# Patient Record
Sex: FEMALE | Race: WHITE | NOT HISPANIC OR LATINO | Employment: OTHER | URBAN - METROPOLITAN AREA
[De-identification: names, ages, dates, MRNs, and addresses within clinical notes are randomized per-mention and may not be internally consistent; named-entity substitution may affect disease eponyms.]

---

## 2017-01-16 ENCOUNTER — TELEPHONE (OUTPATIENT)
Dept: FAMILY MEDICINE | Facility: CLINIC | Age: 67
End: 2017-01-16

## 2017-01-16 NOTE — TELEPHONE ENCOUNTER
Pt was in the hospital and they sent her home with oxygen and she no longer uses    Pt has not used the oxygen it for months     Account   Fax number [phone]    Letter written and faxed    An Escamilla RN, BSN   [place], [facility]

## 2017-01-16 NOTE — Clinical Note
[facility]  [address] [place] [id number]                                                                                                       [phone]    January 16, 2017    Carmen Perez Birth date [birth year]    Account number:     [id number] [facility] [address]  [facility] [address]      To Whom it May Concern:    Please discontinue the above patient's home oxygen's use and collect oxygen currently at her home. Please contact me with questions or concerns.       Sincerely,    Keerthi Danielson PA-C

## 2017-03-23 DIAGNOSIS — I50.20 SYSTOLIC CONGESTIVE HEART FAILURE, UNSPECIFIED CONGESTIVE HEART FAILURE CHRONICITY: ICD-10-CM

## 2017-03-23 RX ORDER — FUROSEMIDE 20 MG
TABLET ORAL
Qty: 90 TABLET | Refills: 0 | Status: SHIPPED | OUTPATIENT
Start: 2017-03-23 | End: 2017-06-21

## 2017-03-23 NOTE — TELEPHONE ENCOUNTER
Furosemide      Last Written Prescription Date: 9/7/16  Last Fill Quantity: 90, # refills: 1  Last Office Visit with [facility], [facility] or [facility] prescribing provider: 9/7/16       Potassium   Date Value Ref Range Status   07/11/2016 5.1 3.4 - 5.3 mmol/L Final     Creatinine   Date Value Ref Range Status   07/11/2016 0.98 0.52 - 1.04 mg/dL Final     BP Readings from Last 3 Encounters:   09/07/16 122/72   08/31/16 115/74   07/11/16 108/74     Candace Montalvo CMA

## 2017-04-05 DIAGNOSIS — J44.9 CHRONIC OBSTRUCTIVE PULMONARY DISEASE, UNSPECIFIED COPD TYPE (H): ICD-10-CM

## 2017-04-06 NOTE — TELEPHONE ENCOUNTER
Xopenex       Last Written Prescription Date: 7/19/16  Last Fill Quantity: 1 inhaler, # refills: 3    Last Office Visit with [facility], [facility] or [facility] prescribing provider:  9/7/16   Future Office Visit:       Date of Last Asthma Action Plan Letter:   There are no preventive care reminders to display for this patient.   Asthma Control Test: No flowsheet data found.    Date of Last Spirometry Test:   No results found for this or any previous visit.          Candace Montalvo CMA

## 2017-04-07 DIAGNOSIS — J44.9 CHRONIC OBSTRUCTIVE PULMONARY DISEASE, UNSPECIFIED COPD TYPE (H): ICD-10-CM

## 2017-04-07 RX ORDER — LEVALBUTEROL TARTRATE 45 UG/1
AEROSOL, METERED ORAL
Qty: 15 G | Refills: 1 | Status: SHIPPED | OUTPATIENT
Start: 2017-04-07 | End: 2017-10-02

## 2017-04-07 RX ORDER — LEVALBUTEROL TARTRATE 45 UG/1
AEROSOL, METERED ORAL
Qty: 1350 G | Refills: 1 | OUTPATIENT
Start: 2017-04-07

## 2017-04-07 NOTE — TELEPHONE ENCOUNTER
Prescription approved per [facility] Refill Protocol.    HEATHER Maldonado

## 2017-04-11 DIAGNOSIS — J44.9 CHRONIC OBSTRUCTIVE PULMONARY DISEASE, UNSPECIFIED COPD TYPE (H): ICD-10-CM

## 2017-04-11 NOTE — TELEPHONE ENCOUNTER
Reason for Call:  Medication or medication refill:    Do you use a [facility]?  Name of the pharmacy and phone number for the current request:  [facility] - [phone]    Name of the medication requested: Combivent inhaler    Other request: other one is not covered by insurance    Can we leave a detailed message on this number? YES    Phone number patient can be reached at: Home number on file [phone] (home)    Best Time: any    Call taken on 4/11/2017 at 4:03 PM by Carol Le

## 2017-04-12 RX ORDER — IPRATROPIUM BROMIDE AND ALBUTEROL 20; 100 UG/1; UG/1
SPRAY, METERED RESPIRATORY (INHALATION)
Qty: 12 G | Refills: 3 | Status: SHIPPED | OUTPATIENT
Start: 2017-04-12 | End: 2017-10-02

## 2017-04-12 NOTE — TELEPHONE ENCOUNTER
combivent inhaler     (xopenex not covered)  Last Written Prescription Date: n/a, historical  Last Fill Quantity: n/a, # refills: n/a    Last Office Visit with [facility] or [facility] prescribing provider:  9/7/2016   Future Office Visit:       Date of Last Asthma Action Plan Letter:   There are no preventive care reminders to display for this patient.   Asthma Control Test: No flowsheet data found.    Date of Last Spirometry Test:   No results found for this or any previous visit.      Routing to PCP for further review/recommendations/orders.  An Teresa RN

## 2017-06-21 DIAGNOSIS — I50.20 SYSTOLIC CONGESTIVE HEART FAILURE, UNSPECIFIED CONGESTIVE HEART FAILURE CHRONICITY: ICD-10-CM

## 2017-06-21 RX ORDER — FUROSEMIDE 20 MG
TABLET ORAL
Qty: 90 TABLET | Refills: 0 | Status: SHIPPED | OUTPATIENT
Start: 2017-06-21 | End: 2017-09-22

## 2017-06-21 NOTE — TELEPHONE ENCOUNTER
Prescription approved per [facility] Refill Protocol.    Kassandra Keys, KIM, RN, PHN  [facility]

## 2017-06-21 NOTE — TELEPHONE ENCOUNTER
furosemide (LASIX) 20 MG tablet      Last Written Prescription Date: 03-  Last Fill Quantity: 90, # refills: 0  Last Office Visit with [facility], [facility] or [facility] prescribing provider: 09-       Potassium   Date Value Ref Range Status   07/11/2016 5.1 3.4 - 5.3 mmol/L Final     Creatinine   Date Value Ref Range Status   07/11/2016 0.98 0.52 - 1.04 mg/dL Final     BP Readings from Last 3 Encounters:   09/07/16 122/72   08/31/16 115/74   07/11/16 108/74

## 2017-09-22 DIAGNOSIS — I50.20 SYSTOLIC CONGESTIVE HEART FAILURE, UNSPECIFIED CONGESTIVE HEART FAILURE CHRONICITY: ICD-10-CM

## 2017-09-22 RX ORDER — FUROSEMIDE 20 MG
TABLET ORAL
Qty: 30 TABLET | Refills: 0 | Status: SHIPPED | OUTPATIENT
Start: 2017-09-22 | End: 2017-10-02

## 2017-09-22 NOTE — TELEPHONE ENCOUNTER
Due for an Office visit (fasting physical) for further refills, only fill for 30 days     Toya Riojas RN  [facility]

## 2017-09-22 NOTE — TELEPHONE ENCOUNTER
furosemide (LASIX) 20 MG tablet      Last Written Prescription Date: 6.21.17  Last Fill Quantity: 90, # refills: 0  Last Office Visit with [facility], [facility] or [facility] prescribing provider: 9.7.16       Potassium   Date Value Ref Range Status   07/11/2016 5.1 3.4 - 5.3 mmol/L Final     Creatinine   Date Value Ref Range Status   07/11/2016 0.98 0.52 - 1.04 mg/dL Final     BP Readings from Last 3 Encounters:   09/07/16 122/72   08/31/16 115/74   07/11/16 108/74

## 2017-09-24 DIAGNOSIS — J44.9 CHRONIC OBSTRUCTIVE PULMONARY DISEASE, UNSPECIFIED COPD TYPE (H): ICD-10-CM

## 2017-09-25 RX ORDER — FUROSEMIDE 20 MG
TABLET ORAL
Qty: 90 TABLET | Refills: 0 | OUTPATIENT
Start: 2017-09-25

## 2017-09-25 NOTE — TELEPHONE ENCOUNTER
Routing refill request to provider for review/approval because:  Patient needs to be seen because it has been more than 1 year since last office visit.  An Teresa RN  [facility]

## 2017-09-25 NOTE — TELEPHONE ENCOUNTER
ADVAIR DISKUS 250-50 MCG/DOSE diskus inhaler       Last Written Prescription Date: 9/21/2016  Last Fill Quantity: 3 Inhaler, # refills: 3    Last Office Visit with [facility], [facility] or [facility] prescribing provider:  9/7/2016   Future Office Visit:       Date of Last Asthma Action Plan Letter:   There are no preventive care reminders to display for this patient.   Asthma Control Test: No flowsheet data found.    Date of Last Spirometry Test:   No results found for this or any previous visit.

## 2017-10-02 ENCOUNTER — OFFICE VISIT (OUTPATIENT)
Dept: FAMILY MEDICINE | Facility: CLINIC | Age: 67
End: 2017-10-02
Payer: MEDICARE

## 2017-10-02 VITALS
DIASTOLIC BLOOD PRESSURE: 84 MMHG | TEMPERATURE: 98.8 F | OXYGEN SATURATION: 91 % | WEIGHT: 173 LBS | HEIGHT: 64 IN | HEART RATE: 72 BPM | BODY MASS INDEX: 29.53 KG/M2 | SYSTOLIC BLOOD PRESSURE: 128 MMHG

## 2017-10-02 DIAGNOSIS — Z93.3 S/P COLOSTOMY (H): ICD-10-CM

## 2017-10-02 DIAGNOSIS — I50.9 CONGESTIVE HEART FAILURE, UNSPECIFIED CONGESTIVE HEART FAILURE CHRONICITY, UNSPECIFIED CONGESTIVE HEART FAILURE TYPE: ICD-10-CM

## 2017-10-02 DIAGNOSIS — Z78.0 POST-MENOPAUSE: ICD-10-CM

## 2017-10-02 DIAGNOSIS — R06.00 DYSPNEA, UNSPECIFIED TYPE: ICD-10-CM

## 2017-10-02 DIAGNOSIS — E55.9 VITAMIN D DEFICIENCY: ICD-10-CM

## 2017-10-02 DIAGNOSIS — J44.9 CHRONIC OBSTRUCTIVE PULMONARY DISEASE, UNSPECIFIED COPD TYPE (H): ICD-10-CM

## 2017-10-02 DIAGNOSIS — Z87.891 FORMER SMOKER: ICD-10-CM

## 2017-10-02 DIAGNOSIS — Z11.59 NEED FOR HEPATITIS C SCREENING TEST: ICD-10-CM

## 2017-10-02 DIAGNOSIS — I27.20 PULMONARY HTN (H): ICD-10-CM

## 2017-10-02 DIAGNOSIS — R63.5 WEIGHT GAIN: ICD-10-CM

## 2017-10-02 DIAGNOSIS — K57.20 DIVERTICULITIS OF LARGE INTESTINE WITH PERFORATION, UNSPECIFIED BLEEDING STATUS: Primary | ICD-10-CM

## 2017-10-02 DIAGNOSIS — Z87.81 HISTORY OF CERVICAL FRACTURE: ICD-10-CM

## 2017-10-02 DIAGNOSIS — M85.80 OSTEOPENIA, UNSPECIFIED LOCATION: ICD-10-CM

## 2017-10-02 LAB
ALBUMIN SERPL-MCNC: 4.1 G/DL (ref 3.4–5)
ALP SERPL-CCNC: 19 U/L (ref 40–150)
ALT SERPL W P-5'-P-CCNC: 22 U/L (ref 0–50)
ANION GAP SERPL CALCULATED.3IONS-SCNC: 7 MMOL/L (ref 3–14)
AST SERPL W P-5'-P-CCNC: 13 U/L (ref 0–45)
BILIRUB SERPL-MCNC: 0.3 MG/DL (ref 0.2–1.3)
BUN SERPL-MCNC: 12 MG/DL (ref 7–30)
CALCIUM SERPL-MCNC: 9.6 MG/DL (ref 8.5–10.1)
CHLORIDE SERPL-SCNC: 106 MMOL/L (ref 94–109)
CO2 SERPL-SCNC: 28 MMOL/L (ref 20–32)
CREAT SERPL-MCNC: 0.8 MG/DL (ref 0.52–1.04)
ERYTHROCYTE [DISTWIDTH] IN BLOOD BY AUTOMATED COUNT: 13.9 % (ref 10–15)
GFR SERPL CREATININE-BSD FRML MDRD: 71 ML/MIN/1.7M2
GLUCOSE SERPL-MCNC: 95 MG/DL (ref 70–99)
HCT VFR BLD AUTO: 40.4 % (ref 35–47)
HGB BLD-MCNC: 13 G/DL (ref 11.7–15.7)
MCH RBC QN AUTO: 27 PG (ref 26.5–33)
MCHC RBC AUTO-ENTMCNC: 32.2 G/DL (ref 31.5–36.5)
MCV RBC AUTO: 84 FL (ref 78–100)
PLATELET # BLD AUTO: 313 10E9/L (ref 150–450)
POTASSIUM SERPL-SCNC: 4.3 MMOL/L (ref 3.4–5.3)
PROT SERPL-MCNC: 7.7 G/DL (ref 6.8–8.8)
RBC # BLD AUTO: 4.81 10E12/L (ref 3.8–5.2)
SODIUM SERPL-SCNC: 141 MMOL/L (ref 133–144)
TSH SERPL DL<=0.005 MIU/L-ACNC: 1.11 MU/L (ref 0.4–4)
WBC # BLD AUTO: 7 10E9/L (ref 4–11)

## 2017-10-02 PROCEDURE — 99215 OFFICE O/P EST HI 40 MIN: CPT | Performed by: PHYSICIAN ASSISTANT

## 2017-10-02 PROCEDURE — 80053 COMPREHEN METABOLIC PANEL: CPT | Performed by: PHYSICIAN ASSISTANT

## 2017-10-02 PROCEDURE — 93000 ELECTROCARDIOGRAM COMPLETE: CPT | Performed by: PHYSICIAN ASSISTANT

## 2017-10-02 PROCEDURE — 36415 COLL VENOUS BLD VENIPUNCTURE: CPT | Performed by: PHYSICIAN ASSISTANT

## 2017-10-02 PROCEDURE — 85027 COMPLETE CBC AUTOMATED: CPT | Performed by: PHYSICIAN ASSISTANT

## 2017-10-02 PROCEDURE — 82306 VITAMIN D 25 HYDROXY: CPT | Performed by: PHYSICIAN ASSISTANT

## 2017-10-02 PROCEDURE — 86803 HEPATITIS C AB TEST: CPT | Performed by: PHYSICIAN ASSISTANT

## 2017-10-02 PROCEDURE — 84443 ASSAY THYROID STIM HORMONE: CPT | Performed by: PHYSICIAN ASSISTANT

## 2017-10-02 RX ORDER — LISINOPRIL 2.5 MG/1
2.5 TABLET ORAL DAILY
Qty: 30 TABLET | Refills: 1 | Status: SHIPPED | OUTPATIENT
Start: 2017-10-02 | End: 2017-10-02

## 2017-10-02 RX ORDER — FUROSEMIDE 20 MG
TABLET ORAL
Qty: 90 TABLET | Refills: 1 | Status: SHIPPED | OUTPATIENT
Start: 2017-10-02 | End: 2018-07-01

## 2017-10-02 ASSESSMENT — ANXIETY QUESTIONNAIRES
IF YOU CHECKED OFF ANY PROBLEMS ON THIS QUESTIONNAIRE, HOW DIFFICULT HAVE THESE PROBLEMS MADE IT FOR YOU TO DO YOUR WORK, TAKE CARE OF THINGS AT HOME, OR GET ALONG WITH OTHER PEOPLE: NOT DIFFICULT AT ALL
2. NOT BEING ABLE TO STOP OR CONTROL WORRYING: NOT AT ALL
GAD7 TOTAL SCORE: 0
7. FEELING AFRAID AS IF SOMETHING AWFUL MIGHT HAPPEN: NOT AT ALL
1. FEELING NERVOUS, ANXIOUS, OR ON EDGE: NOT AT ALL
6. BECOMING EASILY ANNOYED OR IRRITABLE: NOT AT ALL
3. WORRYING TOO MUCH ABOUT DIFFERENT THINGS: NOT AT ALL
5. BEING SO RESTLESS THAT IT IS HARD TO SIT STILL: NOT AT ALL

## 2017-10-02 ASSESSMENT — PATIENT HEALTH QUESTIONNAIRE - PHQ9
5. POOR APPETITE OR OVEREATING: NOT AT ALL
SUM OF ALL RESPONSES TO PHQ QUESTIONS 1-9: 3

## 2017-10-02 NOTE — LETTER
October 9, 2017      Carmen Perez  [id number] [address]  [place] [facility] [place] [address]        Dear ,    We are writing to inform you of your test results.    -Liver and gallbladder tests are normal. (ALT,AST, Alk phos, bilirubin), kidney function is normal (Cr, GFR), Sodium is normal, Potassium is normal, Calcium is normal, Glucose is normal (diabetes screening test).   -TSH (thyroid stimulating hormone) level is normal which indicates normal thyroid function.  -Normal red blood cell (hgb) levels, normal white blood cell count and normal platelet levels.  -Hepatitis C antibody screen test shows no signs of a previous hepatitis C infection.  -Vitamin D level is normal, 1000 IU daily in diet or supplements is recommended.   Follow-up for 2-3 week check as previously discussed in clinic.    Resulted Orders   Comprehensive metabolic panel   Result Value Ref Range    Sodium 141 133 - 144 mmol/L    Potassium 4.3 3.4 - 5.3 mmol/L    Chloride 106 94 - 109 mmol/L    Carbon Dioxide 28 20 - 32 mmol/L    Anion Gap 7 3 - 14 mmol/L    Glucose 95 70 - 99 mg/dL      Comment:      Fasting specimen    Urea Nitrogen 12 7 - 30 mg/dL    Creatinine 0.80 0.52 - 1.04 mg/dL    GFR Estimate 71 >60 mL/min/1.7m2      Comment:      Non  GFR Calc    GFR Estimate If Black 86 >60 mL/min/1.7m2      Comment:       GFR Calc    Calcium 9.6 8.5 - 10.1 mg/dL    Bilirubin Total 0.3 0.2 - 1.3 mg/dL    Albumin 4.1 3.4 - 5.0 g/dL    Protein Total 7.7 6.8 - 8.8 g/dL    Alkaline Phosphatase 19 (L) 40 - 150 U/L    ALT 22 0 - 50 U/L    AST 13 0 - 45 U/L   CBC with platelets   Result Value Ref Range    WBC 7.0 4.0 - 11.0 10e9/L    RBC Count 4.81 3.8 - 5.2 10e12/L    Hemoglobin 13.0 11.7 - 15.7 g/dL    Hematocrit 40.4 35.0 - 47.0 %    MCV 84 78 - 100 fl    MCH 27.0 26.5 - 33.0 pg    MCHC 32.2 31.5 - 36.5 g/dL    RDW 13.9 10.0 - 15.0 %    Platelet Count 313 150 - 450 10e9/L   TSH with free T4 reflex   Result Value Ref Range    TSH  1.11 0.40 - 4.00 mU/L   Vitamin D Deficiency   Result Value Ref Range    Vitamin D Deficiency screening 33 20 - 75 ug/L      Comment:      Season, race, dietary intake, and treatment affect the concentration of   25-hydroxy-Vitamin D. Values may decrease during winter months and increase   during summer months. Values 20-29 ug/L may indicate Vitamin D insufficiency   and values <20 ug/L may indicate Vitamin D deficiency.  Vitamin D determination is routinely performed by an immunoassay specific for   25 hydroxyvitamin D3.  If an individual is on vitamin D2 (ergocalciferol)   supplementation, please specify 25 OH vitamin D2 and D3 level determination by   LCMSMS test [id number].     Hepatitis C antibody   Result Value Ref Range    Hepatitis C Antibody Nonreactive NR^Nonreactive      Comment:      Assay performance characteristics have not been established for newborns,   infants, and children         If you have any questions or concerns, please call the clinic at the number listed above.       Sincerely,        Vicky Rodríguez PA-C

## 2017-10-02 NOTE — PROGRESS NOTES
"  SUBJECTIVE:   Carmen Perez is a 67 year old female who presents to clinic today for the following health issues:    New Patient/Transfer of Care  -wants to come to [place] because she lives really close to here    -doesn't have any specific concerns today -     - patient is fasting today for labs if we want them -    1) Significant PMHx of diverticulitis with colonic perforation in 5/2016. Pt underwent emergency colostomy placement and reports she had a follow-up with GI, but unsure who this was. Was advised to have a colonoscopy through her stoma - this was upsetting because the first person had trouble introducing the scope, but the second person was better. States she thinks everything was normal, but hadn't heard anything back.    Pt states she would be willing to live with her colostomy bag as doesn't want to have on-going surgery if she can avoid it. No GI follow-up since then.   Hospital stay was complicated by 2 falls resulting in C2 and C7 fractures. Saw neurosurgery for consult after this and was in a brace for awhile. Cleared by neurosurgery in 8/2016.   Reports she was diagnosed with osteopenia in the past and had also been on fosamax. Stopped this about 1 yr ago as well though as was \"sick of being on everything.\" Last DEXA was 2015.  States she's never had any issues since her neck as healed and denies any problems since then.      2) COPD. Was hospitalized for 6 days in 2012 for breathing difficulty and LE edema. Dx'd with COPD exacerbation of CHF with cor pulmonale and pulmonary HTN. Reports she did see cardiology and pulmonology for follow-up and had regimen switched from dulera to advair. States advair was the \"best thing that happened to me.\"  Former smoker and she quit after this experience. 40 pack yr hx.  At this point feels her breathing is pretty good and she declines any on-going follow-up with pulmonology at this time.  Did review last pulmonology note with her stating they felt she likely " "would need to be on night-time oxygen for the rest of her life. She was instructed to buy a home oximeter and track her O2 - if it falls below 88 then she was advised to stop her activity and rest or be on oxygen.  States she decided that \"I didn't need this\" and last used home oxygen 8/2016. Had home medical come pick it up. States she has $8 debt left to pay, but doesn't want to re-initiate this as doesn't feel she needs it and doesn't want the cost.  Activity is minimal - does clean her on own house: washes floor, makes bed and picks up around the house.  Admits she \"sits a lot\" and will play computer games.   \"gentle life-style.\" No schedule.   Garage is attached to her apartment and is still independent.   Tries to control what she does \"around my breathing.\"  Only time she feels SOB is if she \"over-exerts\" herself.  No orthopnea.  No LE swelling.   Has gained weight - wonders if this is because she is eating better though and is less active. States they used to be worried about her weight so she has focused more on this dietarily.  Is almost 40lbs heavier than she was 1 yr ago. Does not check her weight routinely at home.   Currently keeps combivent in drawer and purse and will take if needs. Doesn't use duoneb.       Problem list and histories reviewed & adjusted, as indicated.  Additional history: as documented    Patient Active Problem List   Diagnosis     COPD (chronic obstructive pulmonary disease) (H)     CARDIOVASCULAR SCREENING; LDL GOAL LESS THAN 160     Dyspnea     Cor pulmonale (H)     Pulmonary HTN     Tobacco abuse     Hyperkalemia     CHF (congestive heart failure) (H)     S/P colostomy (H) - since colonic perforation in 5/2016     Mild major depression (H)     Diverticulitis of large intestine with perforation, unspecified bleeding status - 5/2016 requiring emergency colostomy placement.     History of cervical fracture - C2,C7 while in hospital following emergency surgery for perforated " diverticula. Cleared by neurosurgery 2016. No chronic issues since.     Past Surgical History:   Procedure Laterality Date      SECTION       HERNIORRHAPHY INCISIONAL (LOCATION) N/A 2016    Procedure: HERNIORRHAPHY INCISIONAL (LOCATION);  Surgeon: Bronson Ornelas MD;  Location: [facility] OR     LAPAROSCOPIC ASSISTED COLECTOMY N/A 2016    Procedure: LAPAROSCOPIC ASSISTED COLECTOMY;  Surgeon: Bronson Ornelas MD;  Location: [facility] OR     LAPAROSCOPIC ASSISTED COLOSTOMY N/A 2016    Procedure: LAPAROSCOPIC ASSISTED COLOSTOMY;  Surgeon: Bronson Ornelas MD;  Location: [facility] OR     LAPAROSCOPIC TUBAL LIGATION         Social History   Substance Use Topics     Smoking status: Former Smoker     Packs/day: 0.25     Types: Cigarettes     Quit date: 2012     Smokeless tobacco: Never Used     Alcohol use No     Family History   Problem Relation Age of Onset     C.A.D. Mother       in her 60's         Current Outpatient Prescriptions   Medication Sig Dispense Refill     fluticasone-salmeterol (ADVAIR DISKUS) 250-50 MCG/DOSE diskus inhaler Inhale 1 puff into the lungs 2 times daily 1 Inhaler 3     Ipratropium-Albuterol (COMBIVENT RESPIMAT)  MCG/ACT inhaler Inhale 1 puff into the lungs 4 times daily as needed for shortness of breath / dyspnea or wheezing 1 Inhaler 1     furosemide (LASIX) 20 MG tablet TAKE 1 TABLET(20 MG) BY MOUTH DAILY 30 tablet 0     acetaminophen (TYLENOL) 325 MG tablet Take 2 tablets (650 mg) by mouth every 6 hours as needed for mild pain 100 tablet 0     ipratropium - albuterol 0.5 mg/2.5 mg/3 mL (DUONEB) 0.5-2.5 (3) MG/3ML nebulization Take 1 vial (3 mLs) by nebulization every 6 hours as needed for shortness of breath / dyspnea or wheezing 90 vial 1     [DISCONTINUED] fluticasone-salmeterol (ADVAIR DISKUS) 250-50 MCG/DOSE diskus inhaler Inhale 1 puff into the lungs 2 times daily Office visit required for further fills 1 Inhaler 0     [DISCONTINUED] COMBIVENT  "RESPIMAT  MCG/ACT inhaler INHALE 1 PUFF INTO THE LUNGS FOUR TIMES DAILY. 12 g 3     [DISCONTINUED] Ipratropium-Albuterol (COMBIVENT RESPIMAT)  MCG/ACT inhaler Inhale 1 puff into the lungs 4 times daily as needed for shortness of breath / dyspnea or wheezing       ARB NOT PRESCRIBED, INTENTIONAL, 1 each daily ARB not prescribed due to Other:low BP  0     No Known Allergies  BP Readings from Last 3 Encounters:   10/02/17 128/84   09/07/16 122/72   08/31/16 115/74    Wt Readings from Last 3 Encounters:   10/02/17 173 lb (78.5 kg)   09/07/16 135 lb 8 oz (61.5 kg)   08/31/16 136 lb 6.4 oz (61.9 kg)           Reviewed and updated as needed this visit by clinical staffTobacco  Allergies  Meds  Med Hx  Surg Hx  Fam Hx  Soc Hx      Reviewed and updated as needed this visit by Provider  Tobacco  Allergies  Meds  Med Hx  Surg Hx  Fam Hx  Soc Hx          ROS:  Constitutional, HEENT, cardiovascular, pulmonary, GI, , musculoskeletal, neuro, skin, endocrine and psych systems are negative, except as otherwise noted.      OBJECTIVE:   /84  Pulse 72  Temp 98.8  F (37.1  C) (Oral)  Ht 5' 4\" (1.626 m)  Wt 173 lb (78.5 kg)  SpO2 91%  Breastfeeding? No  BMI 29.7 kg/m2  Body mass index is 29.7 kg/(m^2).  GENERAL: healthy, alert and no distress  EYES: Eyes grossly normal to inspection, PERRL and conjunctivae and sclerae normal  RESP: breath sounds distant, but clear to auscultation - no rales, rhonchi or wheezes  CV: regular rate and rhythm, no murmur, click or rub, no peripheral edema.  PSYCH: mentation appears normal, affect normal/bright    Diagnostic Test Results:  Reviewed last [facility] labs, most recent EKGs, echo (cardiology had suggested repeat in 3 months, but this was never completed by pt), cardiology and pulmonology consult notes.  Repeated EKG today: personal reading shows NSR with RBBB. No ST segment changes or LVH. RBBB noted on previous EKGs.    Results for orders placed or performed in " visit on 10/02/17   CBC with platelets   Result Value Ref Range    WBC 7.0 4.0 - 11.0 10e9/L    RBC Count 4.81 3.8 - 5.2 10e12/L    Hemoglobin 13.0 11.7 - 15.7 g/dL    Hematocrit 40.4 35.0 - 47.0 %    MCV 84 78 - 100 fl    MCH 27.0 26.5 - 33.0 pg    MCHC 32.2 31.5 - 36.5 g/dL    RDW 13.9 10.0 - 15.0 %    Platelet Count 313 150 - 450 10e9/L       ASSESSMENT/PLAN:       ICD-10-CM    1. Diverticulitis of large intestine with perforation, unspecified bleeding status - 5/2016 requiring emergency colostomy placement. K57.20    2. S/P colostomy (H) - since colonic perforation in 5/2016 Z93.3    3. History of cervical fracture - C2,C7 while in hospital following emergency surgery for perforated diverticula. Cleared by neurosurgery 8/2016. No chronic issues since. Z87.81    4. Chronic obstructive pulmonary disease, unspecified COPD type (H) J44.9 fluticasone-salmeterol (ADVAIR DISKUS) 250-50 MCG/DOSE diskus inhaler     Ipratropium-Albuterol (COMBIVENT RESPIMAT)  MCG/ACT inhaler   5. Osteopenia, unspecified location M85.80 TSH with free T4 reflex     Vitamin D Deficiency     DX Hip/Pelvis/Spine   6. Vitamin D deficiency E55.9 Vitamin D Deficiency   7. Congestive heart failure, unspecified congestive heart failure chronicity, unspecified congestive heart failure type (H) I50.9 Comprehensive metabolic panel     CBC with platelets     TSH with free T4 reflex     EKG 12-lead complete w/read - Clinics     furosemide (LASIX) 20 MG tablet     lisinopril (PRINIVIL/ZESTRIL) 2.5 MG tablet   8. Post-menopause Z78.0 DX Hip/Pelvis/Spine   9. Weight gain - 40 lbs in 1 yr R63.5    10. Dyspnea, unspecified type R06.00    11. Pulmonary HTN - probably secondary to COPD per cardiology I27.20 lisinopril (PRINIVIL/ZESTRIL) 2.5 MG tablet   12. Need for hepatitis C screening test Z11.59 Hepatitis C antibody   New pt establish care with me today.  Significant PMHX reviewed as noted above.  Stable since diverticular colonic perforation 5/2016  with colostomy bag placement. Pt stated she would be ok with keeping her colostomy bag indefinitely as does not desire any further surgeries.  Hospital at that time was complicated by C2 and C7 fractures which have since healed and pt was cleared by neurosurgery 8/2016. Denies any on-going problems with this, but would like to avoid any on-going care at [facility]. Is amenable to care at [facility] and wanted us to know that she would not like any further care at [facility].   Long discussion had regarding how COPD may be cause/contributed to her previous episode of heart failure and pulmonary HTN.  Pt would like to do one thing at a time as otherwise care is too overwhelming for her.  Thus, reviewed current heart failure guidelines recommend ACEI therapy given she is already on a diuretic.   Moving forward though would advise consideration to cardiology consultation to help ensure optimal management.  Additionally, with her weight gain we may likely need to consider repeat echo. Will start with re-checking kidney function given otherwise stable today without any LE swelling and no orthopnea.  See Patient Instructions  Consulted with Dr. Nabor Mendez, supervising physician, and he agrees with treatment plan.  A total of 60 minutes was spent with the patient today, with greater than 50% of the visit involving counseling and coordination of care regarding significant review of PMHX as noted above and in development of plan.  Patient Instructions   Welcome to [place].  Let's back into your good health.  Stable EKG, but given current heart failure guidelines will start low-dose lisinopril.  Risks reviewed.  Re-check labs and kidney function.  Can review results at follow-up.  Re-check in 2-3 weeks.    Electronically Signed By: Vicky Rodríguez PA-C

## 2017-10-02 NOTE — MR AVS SNAPSHOT
After Visit Summary   10/2/2017    Carmen Perez    MRN: [id number]           Patient Information     Date Of Birth          1950        Visit Information        Provider Department      10/2/2017 11:00 AM Torkilsen, Vicky Rhona, PA-C [facility]        Today's Diagnoses     Diverticulitis of large intestine with perforation, unspecified bleeding status - 5/2016 requiring emergency colostomy placement.    -  1    S/P colostomy (H) - since colonic perforation in 5/2016        History of cervical fracture - C2,C7 while in hospital following emergency surgery for perforated diverticula. Cleared by neurosurgery 8/2016. No chronic issues since.        Chronic obstructive pulmonary disease, unspecified COPD type (H)        Osteopenia, unspecified location        Vitamin D deficiency        Congestive heart failure, unspecified congestive heart failure chronicity, unspecified congestive heart failure type (H)        Post-menopause        Weight gain - 40 lbs in 1 yr        Dyspnea, unspecified type        Pulmonary HTN - probably secondary to COPD per cardiology        Need for hepatitis C screening test          Care Instructions    Welcome to [facility].  Let's back into your good health.  Stable EKG, but given current heart failure guidelines will start low-dose lisinopril.  Risks reviewed.  Re-check labs and kidney function.  Can review results at follow-up.  Re-check in 2-3 weeks.    Electronically Signed By: Vicky Rodríguez PA-C            Follow-ups after your visit        Future tests that were ordered for you today     Open Future Orders        Priority Expected Expires Ordered    DX Hip/Pelvis/Spine Routine  10/2/2018 10/2/2017            Who to contact     If you have questions or need follow up information about today's clinic visit or your schedule please contact [facility] directly at [phone].  Normal or non-critical lab and imaging results will be communicated  "to you by [employer], letter or phone within 4 business days after the clinic has received the results. If you do not hear from us within 7 days, please contact the clinic through [employer] or phone. If you have a critical or abnormal lab result, we will notify you by phone as soon as possible.  Submit refill requests through [employer] or call your pharmacy and they will forward the refill request to us. Please allow 3 business days for your refill to be completed.          Additional Information About Your Visit        [employer] Information     [employer] lets you send messages to your doctor, view your test results, renew your prescriptions, schedule appointments and more. To sign up, go to [email or web address] . Click on \"Log in\" on the left side of the screen, which will take you to the Welcome page. Then click on \"Sign up Now\" on the right side of the page.     You will be asked to enter the access code listed below, as well as some personal information. Please follow the directions to create your username and password.     Your access code is: [email or web address]  Expires: 2017  1:12 PM     Your access code will  in 90 days. If you need help or a new code, please call your [facility] clinic or [phone].        Care EveryWhere ID     This is your Care EveryWhere ID. This could be used by other organizations to access your [facility] medical records  [id number]        Your Vitals Were     Pulse Temperature Height Pulse Oximetry Breastfeeding? BMI (Body Mass Index)    72 98.8  F (37.1  C) (Oral) 5' 4\" (1.626 m) 91% No 29.7 kg/m2       Blood Pressure from Last 3 Encounters:   10/02/17 128/84   16 122/72   16 115/74    Weight from Last 3 Encounters:   10/02/17 173 lb (78.5 kg)   16 135 lb 8 oz (61.5 kg)   16 136 lb 6.4 oz (61.9 kg)              We Performed the Following     CBC with platelets     Comprehensive metabolic panel     EKG 12-lead complete w/read - Clinics     Hepatitis C antibody  "    TSH with free T4 reflex     Vitamin D Deficiency          Today's Medication Changes          These changes are accurate as of: 10/2/17  1:12 PM.  If you have any questions, ask your nurse or doctor.               Start taking these medicines.        Dose/Directions    lisinopril 2.5 MG tablet   Commonly known as:  PRINIVIL/Zestril   Used for:  Pulmonary HTN, Congestive heart failure, unspecified congestive heart failure chronicity, unspecified congestive heart failure type (H)   Started by:  Vicky Rodríguez PA-C        Dose:  2.5 mg   Take 1 tablet (2.5 mg) by mouth daily   Quantity:  30 tablet   Refills:  1         These medicines have changed or have updated prescriptions.        Dose/Directions    fluticasone-salmeterol 250-50 MCG/DOSE diskus inhaler   Commonly known as:  ADVAIR DISKUS   This may have changed:  additional instructions   Used for:  Chronic obstructive pulmonary disease, unspecified COPD type (H)   Changed by:  Vicky Rodríguez PA-C        Dose:  1 puff   Inhale 1 puff into the lungs 2 times daily   Quantity:  1 Inhaler   Refills:  3       * ipratropium - albuterol 0.5 mg/2.5 mg/3 mL 0.5-2.5 (3) MG/3ML neb solution   Commonly known as:  DUONEB   This may have changed:  Another medication with the same name was removed. Continue taking this medication, and follow the directions you see here.   Used for:  Chronic obstructive pulmonary disease with acute exacerbation (H)   Changed by:  Keerthi Danielson PA-C        Dose:  1 vial   Take 1 vial (3 mLs) by nebulization every 6 hours as needed for shortness of breath / dyspnea or wheezing   Quantity:  90 vial   Refills:  1       * Ipratropium-Albuterol  MCG/ACT inhaler   Commonly known as:  COMBIVENT RESPIMAT   This may have changed:  Another medication with the same name was removed. Continue taking this medication, and follow the directions you see here.   Used for:  Chronic obstructive pulmonary disease,  unspecified COPD type (H)   Changed by:  Vicky Rodríguez PA-C        Dose:  1 puff   Inhale 1 puff into the lungs 4 times daily as needed for shortness of breath / dyspnea or wheezing   Quantity:  1 Inhaler   Refills:  1       * Notice:  This list has 2 medication(s) that are the same as other medications prescribed for you. Read the directions carefully, and ask your doctor or other care provider to review them with you.      Stop taking these medicines if you haven't already. Please contact your care team if you have questions.     alendronate 70 MG tablet   Commonly known as:  FOSAMAX   Stopped by:  Vicky Rodríguez PA-C           buPROPion 150 MG 12 hr tablet   Commonly known as:  WELLBUTRIN SR   Stopped by:  Vicky Rodríguez PA-C           docusate sodium 100 MG capsule   Commonly known as:  COLACE   Stopped by:  Vicky Rodríguez PA-C           levalbuterol 45 MCG/ACT Inhaler   Commonly known as:  XOPENEX HFA   Stopped by:  Vicky Rodríguez PA-C           potassium chloride SA 20 MEQ CR tablet   Commonly known as:  KLOR-CON   Stopped by:  Vicky Rodríguez PA-C           tiotropium 18 MCG capsule   Commonly known as:  SPIRIVA HANDIHALER   Stopped by:  Vicky Rodríguez PA-C                Where to get your medicines      These medications were sent to [facility] [address] [place] [address] AT [place] [address] & [address] [address]    Hours:  24-hours Phone:  [phone]     fluticasone-salmeterol 250-50 MCG/DOSE diskus inhaler    furosemide 20 MG tablet    Ipratropium-Albuterol  MCG/ACT inhaler    lisinopril 2.5 MG tablet                Primary Care Provider Office Phone # Fax #    Keerthi Danielson PA-C [phone]       [address] [address]        Equal Access to Services     KAJAL RICHARDSON AH: Uriel lacy  itz Naranjo, warachaelda lucrickettacho, qakevin kaladonna garcia, staci mahoneysofia cricket. So [facility] [phone].    ATENCIÓN: Si habla chapis, tiene a parikh disposición servicios gratuitos de asistencia lingüística. Luis E al [phone].    We comply with applicable federal civil rights laws and [place] laws. We do not discriminate on the basis of race, color, national origin, age, disability, sex, sexual orientation, or gender identity.            Thank you!     Thank you for choosing [facility]  for your care. Our goal is always to provide you with excellent care. Hearing back from our patients is one way we can continue to improve our services. Please take a few minutes to complete the written survey that you may receive in the mail after your visit with us. Thank you!             Your Updated Medication List - Protect others around you: Learn how to safely use, store and throw away your medicines at www.disposemymeds.org.          This list is accurate as of: 10/2/17  1:12 PM.  Always use your most recent med list.                   Brand Name Dispense Instructions for use Diagnosis    acetaminophen 325 MG tablet    TYLENOL    100 tablet    Take 2 tablets (650 mg) by mouth every 6 hours as needed for mild pain        ARB NOT PRESCRIBED (INTENTIONAL)      1 each daily ARB not prescribed due to Other:low BP    COPD (chronic obstructive pulmonary disease) (H)       fluticasone-salmeterol 250-50 MCG/DOSE diskus inhaler    ADVAIR DISKUS    1 Inhaler    Inhale 1 puff into the lungs 2 times daily    Chronic obstructive pulmonary disease, unspecified COPD type (H)       furosemide 20 MG tablet    LASIX    90 tablet    TAKE 1 TABLET(20 MG) BY MOUTH DAILY    Congestive heart failure, unspecified congestive heart failure chronicity, unspecified congestive heart failure type (H)       * ipratropium - albuterol 0.5 mg/2.5 mg/3 mL 0.5-2.5 (3) MG/3ML neb solution    DUONEB    90 vial    Take 1 vial  (3 mLs) by nebulization every 6 hours as needed for shortness of breath / dyspnea or wheezing    Chronic obstructive pulmonary disease with acute exacerbation (H)       * Ipratropium-Albuterol  MCG/ACT inhaler    COMBIVENT RESPIMAT    1 Inhaler    Inhale 1 puff into the lungs 4 times daily as needed for shortness of breath / dyspnea or wheezing    Chronic obstructive pulmonary disease, unspecified COPD type (H)       lisinopril 2.5 MG tablet    PRINIVIL/Zestril    30 tablet    Take 1 tablet (2.5 mg) by mouth daily    Pulmonary HTN, Congestive heart failure, unspecified congestive heart failure chronicity, unspecified congestive heart failure type (H)       * Notice:  This list has 2 medication(s) that are the same as other medications prescribed for you. Read the directions carefully, and ask your doctor or other care provider to review them with you.

## 2017-10-02 NOTE — PATIENT INSTRUCTIONS
Welcome to [facility].  Let's back into your good health.  Stable EKG, but given current heart failure guidelines will start low-dose lisinopril.  Risks reviewed.  Re-check labs and kidney function.  Can review results at follow-up.  Re-check in 2-3 weeks.    Electronically Signed By: Vicky Rodríguez PA-C

## 2017-10-02 NOTE — NURSING NOTE
"Chief Complaint   Patient presents with     Establish Care     update on everything and get acquainted       Initial Pulse 113  Temp 98.8  F (37.1  C) (Oral)  Ht 5' 4\" (1.626 m)  Wt 173 lb (78.5 kg)  SpO2 91%  Breastfeeding? No  BMI 29.7 kg/m2 Estimated body mass index is 29.7 kg/(m^2) as calculated from the following:    Height as of this encounter: 5' 4\" (1.626 m).    Weight as of this encounter: 173 lb (78.5 kg).  Medication Reconciliation: complete    "

## 2017-10-03 LAB
DEPRECATED CALCIDIOL+CALCIFEROL SERPL-MC: 33 UG/L (ref 20–75)
HCV AB SERPL QL IA: NONREACTIVE

## 2017-10-03 RX ORDER — LISINOPRIL 2.5 MG/1
TABLET ORAL
Qty: 90 TABLET | Refills: 0 | Status: SHIPPED | OUTPATIENT
Start: 2017-10-03 | End: 2017-12-19

## 2017-10-03 ASSESSMENT — ANXIETY QUESTIONNAIRES: GAD7 TOTAL SCORE: 0

## 2017-10-09 NOTE — PROGRESS NOTES
Please call or write patient with the following results:    -Liver and gallbladder tests are normal. (ALT,AST, Alk phos, bilirubin), kidney function is normal (Cr, GFR), Sodium is normal, Potassium is normal, Calcium is normal, Glucose is normal (diabetes screening test).   -TSH (thyroid stimulating hormone) level is normal which indicates normal thyroid function.  -Normal red blood cell (hgb) levels, normal white blood cell count and normal platelet levels.  -Hepatitis C antibody screen test shows no signs of a previous hepatitis C infection.  -Vitamin D level is normal, 1000 IU daily in diet or supplements is recommended.   Follow-up for 2-3 week check as previously discussed in clinic.    Electronically Signed By: Vicky Rodríguez PA-C

## 2017-10-22 DIAGNOSIS — I50.20 SYSTOLIC CONGESTIVE HEART FAILURE, UNSPECIFIED CONGESTIVE HEART FAILURE CHRONICITY: ICD-10-CM

## 2017-10-23 RX ORDER — FUROSEMIDE 20 MG
TABLET ORAL
Qty: 30 TABLET | Refills: 0 | OUTPATIENT
Start: 2017-10-23

## 2017-10-23 NOTE — TELEPHONE ENCOUNTER
furosemide (LASIX) 20 MG tablet 90 tablet 1 10/2/2017  No   Sig: TAKE 1 TABLET(20 MG) BY MOUTH DAILY   Class: E-Prescribe   Order: [id number]       Last Office Visit with G, P or [facility] prescribing provider: 10/2/2017  Next 5 appointments (look out 90 days)     Oct 30, 2017 11:00 AM CDT   Office Visit with Vicky Rodríguez PA-C   [facility] ([facility])    [address] [address] [phone]                   Potassium   Date Value Ref Range Status   10/02/2017 4.3 3.4 - 5.3 mmol/L Final     Creatinine   Date Value Ref Range Status   10/02/2017 0.80 0.52 - 1.04 mg/dL Final     BP Readings from Last 3 Encounters:   10/02/17 128/84   09/07/16 122/72   08/31/16 115/74     Should have refills left     Latonya Gomez RN, BSN  [facility]

## 2017-10-30 ENCOUNTER — OFFICE VISIT (OUTPATIENT)
Dept: FAMILY MEDICINE | Facility: CLINIC | Age: 67
End: 2017-10-30
Payer: MEDICARE

## 2017-10-30 VITALS
BODY MASS INDEX: 29.37 KG/M2 | SYSTOLIC BLOOD PRESSURE: 118 MMHG | TEMPERATURE: 98.6 F | WEIGHT: 172 LBS | OXYGEN SATURATION: 95 % | HEIGHT: 64 IN | HEART RATE: 103 BPM | DIASTOLIC BLOOD PRESSURE: 78 MMHG

## 2017-10-30 DIAGNOSIS — I50.9 CONGESTIVE HEART FAILURE, UNSPECIFIED CONGESTIVE HEART FAILURE CHRONICITY, UNSPECIFIED CONGESTIVE HEART FAILURE TYPE: ICD-10-CM

## 2017-10-30 DIAGNOSIS — Z23 NEED FOR PNEUMOCOCCAL VACCINE: ICD-10-CM

## 2017-10-30 DIAGNOSIS — R05.9 COUGH: Primary | ICD-10-CM

## 2017-10-30 DIAGNOSIS — Z23 NEED FOR PROPHYLACTIC VACCINATION AND INOCULATION AGAINST INFLUENZA: ICD-10-CM

## 2017-10-30 DIAGNOSIS — Z13.6 CARDIOVASCULAR SCREENING; LDL GOAL LESS THAN 160: ICD-10-CM

## 2017-10-30 DIAGNOSIS — I27.20 PULMONARY HTN (H): ICD-10-CM

## 2017-10-30 PROCEDURE — 80048 BASIC METABOLIC PNL TOTAL CA: CPT | Performed by: PHYSICIAN ASSISTANT

## 2017-10-30 PROCEDURE — 36415 COLL VENOUS BLD VENIPUNCTURE: CPT | Performed by: PHYSICIAN ASSISTANT

## 2017-10-30 PROCEDURE — G0008 ADMIN INFLUENZA VIRUS VAC: HCPCS | Performed by: PHYSICIAN ASSISTANT

## 2017-10-30 PROCEDURE — G0009 ADMIN PNEUMOCOCCAL VACCINE: HCPCS | Performed by: PHYSICIAN ASSISTANT

## 2017-10-30 PROCEDURE — 90732 PPSV23 VACC 2 YRS+ SUBQ/IM: CPT | Performed by: PHYSICIAN ASSISTANT

## 2017-10-30 PROCEDURE — 99214 OFFICE O/P EST MOD 30 MIN: CPT | Mod: 25 | Performed by: PHYSICIAN ASSISTANT

## 2017-10-30 PROCEDURE — 90662 IIV NO PRSV INCREASED AG IM: CPT | Performed by: PHYSICIAN ASSISTANT

## 2017-10-30 PROCEDURE — 80061 LIPID PANEL: CPT | Performed by: PHYSICIAN ASSISTANT

## 2017-10-30 NOTE — PROGRESS NOTES

## 2017-10-30 NOTE — NURSING NOTE
"Chief Complaint   Patient presents with     RECHECK       Initial /78  Pulse 103  Temp 98.6  F (37  C) (Oral)  Ht 5' 4\" (1.626 m)  Wt 172 lb (78 kg)  SpO2 95%  Breastfeeding? No  BMI 29.52 kg/m2 Estimated body mass index is 29.52 kg/(m^2) as calculated from the following:    Height as of this encounter: 5' 4\" (1.626 m).    Weight as of this encounter: 172 lb (78 kg).  Medication Reconciliation: complete    "

## 2017-10-30 NOTE — LETTER
November 8, 2017      Carmen Perez  [address] [address]  [place] [address]        Dear ,    We are writing to inform you of your test results.    -Kidney function is normal (Cr, GFR), Sodium is normal, Potassium is normal, Calcium is normal, Glucose is normal (diabetes screening test).   -LDL(bad) cholesterol level is elevated.  -HDL(good) cholesterol level is normal.  -Triglycerides are elevated which can increase your heart disease risk.  A diet high in fat and simple carbohydrates, genetics and being overweight can contribute to this. ADVISE: Exercise, weight control, and omega-3 fatty acids (fish oil) 6572-5103 mg daily are helpful to improve this. Current guidelines and your 10 year heart disease risk assessment recommend treatment with cholesterol lowering therapy. Please schedule a follow-up appointment to discuss this further.    The 10-year ASCVD risk score (Wilmer PLASCENCIA Jr, et al., 2013) is: 10.2%    Values used to calculate the score:      Age: 67 years      Sex: Female      Is Non- : No      Diabetic: No      Tobacco smoker: No      Systolic Blood Pressure: 118 mmHg      Is BP treated: Yes      HDL Cholesterol: 70 mg/dL      Total Cholesterol: 274 mg/dL    Resulted Orders   Basic metabolic panel  (Ca, Cl, CO2, Creat, Gluc, K, Na, BUN)   Result Value Ref Range    Sodium 140 133 - 144 mmol/L    Potassium 4.4 3.4 - 5.3 mmol/L    Chloride 103 94 - 109 mmol/L    Carbon Dioxide 27 20 - 32 mmol/L    Anion Gap 10 3 - 14 mmol/L    Glucose 95 70 - 99 mg/dL    Urea Nitrogen 14 7 - 30 mg/dL    Creatinine 0.78 0.52 - 1.04 mg/dL    GFR Estimate 74 >60 mL/min/1.7m2      Comment:      Non  GFR Calc    GFR Estimate If Black 89 >60 mL/min/1.7m2      Comment:       GFR Calc    Calcium 9.5 8.5 - 10.1 mg/dL   Lipid panel reflex to direct LDL Fasting   Result Value Ref Range    Cholesterol 274 (H) <200 mg/dL      Comment:      Desirable:       <200 mg/dl     Triglycerides 179 (H) <150 mg/dL      Comment:      Borderline high:  150-199 mg/dl  High:             200-499 mg/dl  Very high:       >499 mg/dl      HDL Cholesterol 70 >49 mg/dL    LDL Cholesterol Calculated 168 (H) <100 mg/dL      Comment:      Above desirable:  100-129 mg/dl  Borderline High:  130-159 mg/dL  High:             160-189 mg/dL  Very high:       >189 mg/dl      Non HDL Cholesterol 204 (H) <130 mg/dL      Comment:      Above Desirable:  130-159 mg/dl  Borderline high:  160-189 mg/dl  High:             190-219 mg/dl  Very high:       >219 mg/dl         If you have any questions or concerns, please call the clinic at the number listed above.       Sincerely,        Vicky Rodríguez PA-C

## 2017-10-30 NOTE — PATIENT INSTRUCTIONS
Unclear if cough is side effect of lisinopril versus symptom of CHF versus symptom of COPD.  Given you're feeling well on this, will continue for now.  Let's repeat echo since it's been 5 yrs though for re-assessment.  Consider cardiology consult pending results regarding optimal med management.    Electronically Signed By: Vicky Rodríguez PA-C

## 2017-10-30 NOTE — PROGRESS NOTES
SUBJECTIVE:   Carmen Perez is a 67 year old female who presents to clinic today for the following health issues:      Medication Followup of lisinopril - states that since our last visit she is just feeling better overall. Can't put her finger on exactly   Has noted a little cough at night since starting the lisinopril.  She did not have this before.  Once she sits upright and has a cough drop then the cough will resolve.  Every now and again will have a cough during the day, but mostly notices at night.  Lady above her is a smoker and sometimes she feels like she wakes her up.  No orthopnea.  Has been paying attention to her feet/legs more now as she is putting socks on due to colder weather and has been noticing some increase in LE swelling. Notice more of a line around ankle when she removes her cough.   Weight is stable from last visit no further weight gain.   Feels she sleeps well.   If she doesn't know who is calling she doesn't answer her phone.       Taking Medication as prescribed: yes    Side Effects:  None    Medication Helping Symptoms:  yes         Problem list and histories reviewed & adjusted, as indicated.  Additional history: as documented    Patient Active Problem List   Diagnosis     COPD (chronic obstructive pulmonary disease) (H)     CARDIOVASCULAR SCREENING; LDL GOAL LESS THAN 160     Dyspnea     Cor pulmonale (H)     Pulmonary HTN     Hyperkalemia     CHF (congestive heart failure) (H)     S/P colostomy (H) - since colonic perforation in 2016     Mild major depression (H)     Diverticulitis of large intestine with perforation, unspecified bleeding status - 2016 requiring emergency colostomy placement.     History of cervical fracture - C2,C7 while in hospital following emergency surgery for perforated diverticula. Cleared by neurosurgery 2016. No chronic issues since.     Former smoker - 40 pack yr history     Past Surgical History:   Procedure Laterality Date      SECTION        HERNIORRHAPHY INCISIONAL (LOCATION) N/A 2016    Procedure: HERNIORRHAPHY INCISIONAL (LOCATION);  Surgeon: Bronson Ornelas MD;  Location: [facility] OR     LAPAROSCOPIC ASSISTED COLECTOMY N/A 2016    Procedure: LAPAROSCOPIC ASSISTED COLECTOMY;  Surgeon: Bronson Ornelas MD;  Location: [facility] OR     LAPAROSCOPIC ASSISTED COLOSTOMY N/A 2016    Procedure: LAPAROSCOPIC ASSISTED COLOSTOMY;  Surgeon: Bronson Ornelas MD;  Location: [facility] OR     LAPAROSCOPIC TUBAL LIGATION         Social History   Substance Use Topics     Smoking status: Former Smoker     Packs/day: 0.25     Types: Cigarettes     Quit date: 2012     Smokeless tobacco: Never Used     Alcohol use No     Family History   Problem Relation Age of Onset     C.A.D. Mother       in her 60's         Current Outpatient Prescriptions   Medication Sig Dispense Refill     lisinopril (PRINIVIL/ZESTRIL) 2.5 MG tablet TAKE 1 TABLET(2.5 MG) BY MOUTH DAILY 90 tablet 0     fluticasone-salmeterol (ADVAIR DISKUS) 250-50 MCG/DOSE diskus inhaler Inhale 1 puff into the lungs 2 times daily 1 Inhaler 3     Ipratropium-Albuterol (COMBIVENT RESPIMAT)  MCG/ACT inhaler Inhale 1 puff into the lungs 4 times daily as needed for shortness of breath / dyspnea or wheezing 1 Inhaler 1     furosemide (LASIX) 20 MG tablet TAKE 1 TABLET(20 MG) BY MOUTH DAILY 90 tablet 1     acetaminophen (TYLENOL) 325 MG tablet Take 2 tablets (650 mg) by mouth every 6 hours as needed for mild pain 100 tablet 0     ipratropium - albuterol 0.5 mg/2.5 mg/3 mL (DUONEB) 0.5-2.5 (3) MG/3ML nebulization Take 1 vial (3 mLs) by nebulization every 6 hours as needed for shortness of breath / dyspnea or wheezing 90 vial 1     ARB NOT PRESCRIBED, INTENTIONAL, 1 each daily ARB not prescribed due to Other:low BP  0     No Known Allergies      Reviewed and updated as needed this visit by clinical staffTobacco  Allergies  Meds  Med Hx  Surg Hx  Fam Hx  Soc Hx      Reviewed and updated  "as needed this visit by Provider  Tobacco  Allergies  Meds  Med Hx  Surg Hx  Fam Hx  Soc Hx        ROS:  Constitutional, HEENT, cardiovascular, pulmonary, gi and gu systems are negative, except as otherwise noted.      OBJECTIVE:   /78  Pulse 103  Temp 98.6  F (37  C) (Oral)  Ht 5' 4\" (1.626 m)  Wt 172 lb (78 kg)  SpO2 95%  Breastfeeding? No  BMI 29.52 kg/m2  Body mass index is 29.52 kg/(m^2).  GENERAL: healthy, alert and no distress  EYES: Eyes grossly normal to inspection, PERRL and conjunctivae and sclerae normal  RESP: lungs clear to auscultation - no rales, rhonchi or wheezes  CV: regular rate and rhythm, normal S1 S2, no S3 or S4, no murmur, click or rub.  EXT: perhaps trace LE edema.      Diagnostic Test Results:  none     ASSESSMENT/PLAN:         ICD-10-CM    1. Cough R05    2. Congestive heart failure, unspecified congestive heart failure chronicity, unspecified congestive heart failure type (H) I50.9 Echocardiogram Complete     Basic metabolic panel  (Ca, Cl, CO2, Creat, Gluc, K, Na, BUN)   3. Pulmonary HTN - probably secondary to COPD per cardiology I27.20 Echocardiogram Complete   4. Need for pneumococcal vaccine Z23 Pneumococcal vaccine 23 valent PPSV23  (Pneumovax) [00834]     ADMIN: Vaccine, Initial (19222)   5. CARDIOVASCULAR SCREENING; LDL GOAL LESS THAN 160 Z13.6 Lipid panel reflex to direct LDL Fasting   6. Need for prophylactic vaccination and inoculation against influenza Z23 FLU VACCINE, INCREASED ANTIGEN, PRESV FREE, AGE 65+ [21470]   cough mostly nightly and new per pt since starting lisinopril.  ?med SE versus due to heart failure given significant hx of COPD with cor pulmonale and mild LE swelling versus symptom of COPD.  Pt otherwise tolerating lisinopril well and feels improved on it. Thus, amenable to continuation for the time being.  Update vaccines while here today and re-check labs.  Pt amenable to repeating echo and consideration to cardiology consult pending " results.  See Patient Instructions  A total of 30 minutes was spent with the patient today, with greater than 50% of the visit involving counseling and coordination of care regarding that noted above and in pt instructions.  Patient Instructions   Unclear if cough is side effect of lisinopril versus symptom of CHF versus symptom of COPD.  Given you're feeling well on this, will continue for now.  Let's repeat echo since it's been 5 yrs though for re-assessment.  Consider cardiology consult pending results regarding optimal med management.    Electronically Signed By: Vicky Rodríguez PA-C

## 2017-10-30 NOTE — MR AVS SNAPSHOT
After Visit Summary   10/30/2017    Carmen Perez    MRN: [id number]           Patient Information     Date Of Birth          1950        Visit Information        Provider Department      10/30/2017 11:00 AM Vicky Rodríguez PA-C [facility]        Today's Diagnoses     Congestive heart failure, unspecified congestive heart failure chronicity, unspecified congestive heart failure type (H)    -  1    Pulmonary HTN - probably secondary to COPD per cardiology        Need for influenza vaccination        Need for pneumococcal vaccine        CARDIOVASCULAR SCREENING; LDL GOAL LESS THAN 160          Care Instructions    Unclear if cough is side effect of lisinopril versus symptom of CHF versus symptom of COPD.  Given you're feeling well on this, will continue for now.  Let's repeat echo since it's been 5 yrs though for re-assessment.  Consider cardiology consult pending results regarding optimal med management.    Electronically Signed By: Vicky Rodríguez PA-C            Follow-ups after your visit        Your next 10 appointments already scheduled     Nov 01, 2017  9:00 AM CDT   Ech Complete with [address] ([facility])    [address]  [facility] [address] [phone]           1. Please bring or wear a comfortable two-piece outfit. 2. You may eat, drink and take your normal medicines. 3. For any questions that cannot be answered, please contact the ordering physician ***Please check-in at the [place] Registration Office located in [address] in the [facility] building. When you are finished registering, please go to [address] and have a seat. The technician will call your name for the test.              Future tests that were ordered for you today     Open Future Orders        Priority Expected Expires Ordered    Echocardiogram Complete Routine  10/30/2018 10/30/2017            Who to  "contact     If you have questions or need follow up information about today's clinic visit or your schedule please contact [facility] directly at [phone].  Normal or non-critical lab and imaging results will be communicated to you by MyChart, letter or phone within 4 business days after the clinic has received the results. If you do not hear from us within 7 days, please contact the clinic through MyChart or phone. If you have a critical or abnormal lab result, we will notify you by phone as soon as possible.  Submit refill requests through BOND or call your pharmacy and they will forward the refill request to us. Please allow 3 business days for your refill to be completed.          Additional Information About Your Visit        INFUSDhardINK Information     BOND gives you secure access to your electronic health record. If you see a primary care provider, you can also send messages to your care team and make appointments. If you have questions, please call your primary care clinic.  If you do not have a primary care provider, please call [phone] and they will assist you.        Care EveryWhere ID     This is your Care EveryWhere ID. This could be used by other organizations to access your [place] medical records  [id number]        Your Vitals Were     Pulse Temperature Height Pulse Oximetry Breastfeeding? BMI (Body Mass Index)    103 98.6  F (37  C) (Oral) 5' 4\" (1.626 m) 95% No 29.52 kg/m2       Blood Pressure from Last 3 Encounters:   10/30/17 118/78   10/02/17 128/84   09/07/16 122/72    Weight from Last 3 Encounters:   10/30/17 172 lb (78 kg)   10/02/17 173 lb (78.5 kg)   09/07/16 135 lb 8 oz (61.5 kg)              We Performed the Following     Basic metabolic panel  (Ca, Cl, CO2, Creat, Gluc, K, Na, BUN)     Lipid panel reflex to direct LDL Fasting        Primary Care Provider Office Phone # Fax #    Keerthi Danielson PA-C [phone]       [facility] " [address] [facility] [address]        Equal Access to Services     JANELLVASYL DEBRA : Hadii dinah lacy tarikpiotr Irmaali, warachaelda tialyleha, qaantelmota satishrachelekta olguinnegritoekta, staci gomezosmeldustin harley. So [facility] [phone].    ATENCIÓN: Si habla español, tiene a parikh disposición servicios gratuitos de asistencia lingüística. Llame al [phone].    We comply with applicable federal civil rights laws and [place] laws. We do not discriminate on the basis of race, color, national origin, age, disability, sex, sexual orientation, or gender identity.            Thank you!     [facility] you [facility]  [facility]. Our goal is always to provide you with excellent care. Hearing back from our patients is one way we can continue to improve our services. Please take a few minutes to complete the written survey that you may receive in the mail after your visit with us. Thank you!             Your Updated Medication List - Protect others around you: Learn how to safely use, store and throw away your medicines at www.disposemymeds.org.          This list is accurate as of: 10/30/17 12:04 PM.  Always use your most recent med list.                   Brand Name Dispense Instructions for use Diagnosis    acetaminophen 325 MG tablet    TYLENOL    100 tablet    Take 2 tablets (650 mg) by mouth every 6 hours as needed for mild pain        ARB NOT PRESCRIBED (INTENTIONAL)      1 each daily ARB not prescribed due to Other:low BP    COPD (chronic obstructive pulmonary disease) (H)       fluticasone-salmeterol 250-50 MCG/DOSE diskus inhaler    ADVAIR DISKUS    1 Inhaler    Inhale 1 puff into the lungs 2 times daily    Chronic obstructive pulmonary disease, unspecified COPD type (H)       furosemide 20 MG tablet    LASIX    90 tablet    TAKE 1 TABLET(20 MG) BY MOUTH DAILY    Congestive heart failure, unspecified congestive heart failure chronicity, unspecified congestive heart failure type (H)       *  ipratropium - albuterol 0.5 mg/2.5 mg/3 mL 0.5-2.5 (3) MG/3ML neb solution    DUONEB    90 vial    Take 1 vial (3 mLs) by nebulization every 6 hours as needed for shortness of breath / dyspnea or wheezing    Chronic obstructive pulmonary disease with acute exacerbation (H)       * Ipratropium-Albuterol  MCG/ACT inhaler    COMBIVENT RESPIMAT    1 Inhaler    Inhale 1 puff into the lungs 4 times daily as needed for shortness of breath / dyspnea or wheezing    Chronic obstructive pulmonary disease, unspecified COPD type (H)       lisinopril 2.5 MG tablet    PRINIVIL/Zestril    90 tablet    TAKE 1 TABLET(2.5 MG) BY MOUTH DAILY    Pulmonary HTN, Congestive heart failure, unspecified congestive heart failure chronicity, unspecified congestive heart failure type (H)       * Notice:  This list has 2 medication(s) that are the same as other medications prescribed for you. Read the directions carefully, and ask your doctor or other care provider to review them with you.

## 2017-10-31 LAB
ANION GAP SERPL CALCULATED.3IONS-SCNC: 10 MMOL/L (ref 3–14)
BUN SERPL-MCNC: 14 MG/DL (ref 7–30)
CALCIUM SERPL-MCNC: 9.5 MG/DL (ref 8.5–10.1)
CHLORIDE SERPL-SCNC: 103 MMOL/L (ref 94–109)
CHOLEST SERPL-MCNC: 274 MG/DL
CO2 SERPL-SCNC: 27 MMOL/L (ref 20–32)
CREAT SERPL-MCNC: 0.78 MG/DL (ref 0.52–1.04)
GFR SERPL CREATININE-BSD FRML MDRD: 74 ML/MIN/1.7M2
GLUCOSE SERPL-MCNC: 95 MG/DL (ref 70–99)
HDLC SERPL-MCNC: 70 MG/DL
LDLC SERPL CALC-MCNC: 168 MG/DL
NONHDLC SERPL-MCNC: 204 MG/DL
POTASSIUM SERPL-SCNC: 4.4 MMOL/L (ref 3.4–5.3)
SODIUM SERPL-SCNC: 140 MMOL/L (ref 133–144)
TRIGL SERPL-MCNC: 179 MG/DL

## 2017-11-01 ENCOUNTER — HOSPITAL ENCOUNTER (OUTPATIENT)
Dept: CARDIOLOGY | Facility: CLINIC | Age: 67
Discharge: HOME | End: 2017-11-01
Attending: PHYSICIAN ASSISTANT | Admitting: PHYSICIAN ASSISTANT
Payer: MEDICARE

## 2017-11-01 DIAGNOSIS — I27.20 PULMONARY HTN (H): ICD-10-CM

## 2017-11-01 DIAGNOSIS — I50.9 CONGESTIVE HEART FAILURE, UNSPECIFIED CONGESTIVE HEART FAILURE CHRONICITY, UNSPECIFIED CONGESTIVE HEART FAILURE TYPE: ICD-10-CM

## 2017-11-01 PROCEDURE — 93306 TTE W/DOPPLER COMPLETE: CPT | Mod: 26 | Performed by: INTERNAL MEDICINE

## 2017-11-01 PROCEDURE — 93306 TTE W/DOPPLER COMPLETE: CPT

## 2017-11-08 NOTE — PROGRESS NOTES
Please call or write patient with the following results:    -Kidney function is normal (Cr, GFR), Sodium is normal, Potassium is normal, Calcium is normal, Glucose is normal (diabetes screening test).   -LDL(bad) cholesterol level is elevated.  -HDL(good) cholesterol level is normal.  -Triglycerides are elevated which can increase your heart disease risk.  A diet high in fat and simple carbohydrates, genetics and being overweight can contribute to this. ADVISE: Exercise, weight control, and omega-3 fatty acids (fish oil) 5730-4432 mg daily are helpful to improve this. Current guidelines and your 10 year heart disease risk assessment recommend treatment with cholesterol lowering therapy. Please schedule a follow-up appointment to discuss this further.    The 10-year ASCVD risk score (Wilmer DC Jr, et al., 2013) is: 10.2%    Values used to calculate the score:      Age: 67 years      Sex: Female      Is Non- : No      Diabetic: No      Tobacco smoker: No      Systolic Blood Pressure: 118 mmHg      Is BP treated: Yes      HDL Cholesterol: 70 mg/dL      Total Cholesterol: 274 mg/dL    Electronically Signed By: Vicky Rodríguez PA-C

## 2017-11-16 ENCOUNTER — TELEPHONE (OUTPATIENT)
Dept: FAMILY MEDICINE | Facility: CLINIC | Age: 67
End: 2017-11-16

## 2017-11-16 NOTE — TELEPHONE ENCOUNTER
Completed forms faxed back to [facility] at [phone].   Originals sent to be scanned.     Maribell Robertson

## 2017-11-20 NOTE — PROGRESS NOTES
Please call patient with the following results:    -Echocardiogram is improved from previous, but continues to show dilated R ventricle and mildly dilated ascending aorta. She does have normal heart pumping function at this time though which is reassuring. I would like for her to see cardiology for consultation regarding overall review and to ensure appropriate med management. Referral was placed for her. Please notify.    Electronically Signed By: Vicky Rodríguez PA-C

## 2017-12-14 ENCOUNTER — PRE VISIT (OUTPATIENT)
Dept: CARDIOLOGY | Facility: CLINIC | Age: 67
End: 2017-12-14

## 2017-12-19 ENCOUNTER — OFFICE VISIT (OUTPATIENT)
Dept: CARDIOLOGY | Facility: CLINIC | Age: 67
End: 2017-12-19
Attending: PHYSICIAN ASSISTANT
Payer: MEDICARE

## 2017-12-19 VITALS
HEART RATE: 100 BPM | HEIGHT: 64 IN | SYSTOLIC BLOOD PRESSURE: 123 MMHG | OXYGEN SATURATION: 94 % | WEIGHT: 173 LBS | DIASTOLIC BLOOD PRESSURE: 81 MMHG | BODY MASS INDEX: 29.53 KG/M2

## 2017-12-19 DIAGNOSIS — I27.20 PULMONARY HTN (H): ICD-10-CM

## 2017-12-19 DIAGNOSIS — J43.1 PANLOBULAR EMPHYSEMA (H): Primary | ICD-10-CM

## 2017-12-19 PROCEDURE — 99203 OFFICE O/P NEW LOW 30 MIN: CPT | Performed by: INTERNAL MEDICINE

## 2017-12-19 NOTE — PROGRESS NOTES
PRIMARY CARE PROVIDER:  YAMIL Yancey      HISTORY OF PRESENT ILLNESS:  I had the pleasure of seeing your patient, Carmen Perez, at [facility] for evaluation of shortness of breath and previous pulmonary hypertension.  This patient was last seen within our office by Dr. Eva Keller on 09/10/2012.  The patient was admitted to the hospital in June of that year for dyspnea, pneumonia and severe COPD.  She had a significant smoking history.  Her troponin levels were mildly elevated.  A transthoracic echocardiogram demonstrated normal left ventricular systolic function without regional wall motion abnormalities.  There was evidence of RV pressure overload with severe dilatation of the right ventricle and mildly decreased right ventricular systolic function.  Mild pulmonary hypertension was present with RVSP of 34 mmHg plus right atrial pressure.  A dobutamine stress echo was then performed on 06/13/2012 demonstrating no evidence of ischemia.  The patient was seen in pulmonary consultation.  A right heart catheterization was considered but not performed.  Pulmonary function tests in 07/2012 confirmed severe COPD and the patient was started on Dulera.  The patient has not smoked since that time.  She does not use home oxygen.  She was exercising more vigorously.  Unfortunately in 05/2016 the patient developed colonic rupture due to diverticulitis.  Laparoscopic-assisted colectomy and colostomy was then performed.  The patient has done very little exercise since that time and gained considerable weight.  She denies PND or orthopnea but does have some occasional peripheral edema for which she uses furosemide.  The patient during that hospitalization also fell and fractured 2 cervical vertebrae.  This has healed for the most part.  The patient has never had a myocardial infarction or stroke.  She has hyperlipidemia which is currently not being treated.      PHYSICAL EXAMINATION:  As listed below.         Transthoracic echocardiogram performed on 11/01/2017 demonstrates normal left ventricular size and function.  The right ventricle is mildly to moderately dilated but there is normal systolic function.  There is trivial pericardial effusion.  RVSP has normalized from the previous echo to 22 mmHg.  There is mild aortic root dilatation to 3.9 cm and ascending aortic diameter dilatation to 3.8 cm.      On 10/30/2017 total cholesterol 274, HDL 70,  and triglycerides 179.  Comparison of weight from 09/07/2016 at 135 to current 173 is a 38-pound weight gain.      ASSESSMENT:   1.  Carmen Perez is a delightful 67-year-old female with severe COPD who presents for significant dyspnea on exertion and shortness of breath.  Her pulmonary hypertension has improved since 2012 with the discontinuation of her smoking.  Her right ventricle remains mild to moderately dilated but there is normal systolic function.  The patient's shortness of breath is not related to her heart but instead her lungs.  Her COPD is significantly restricting her.  I have asked this patient to obtain a followup consultation with a pulmonologist.  She will discuss this with her primary care physician.  Additionally I believe this patient should be enrolled in pulmonary rehabilitation for strengthening purposes.  I would discontinue her lisinopril since this dose is probably not effective in treating pulmonary hypertension anyway.  Her pulmonary pressures have now normalized.   2.  It is unclear to me why this patient is using furosemide but I think if she has evidence of venous insufficiency and significant edema, use of low-dose furosemide is fine.  The patient's inferior vena cava is not dilated and there is no suggestion of right heart failure.   3.  The patient's LDL cholesterol is elevated as are her triglycerides.  I have suggested that if this patient can lose some weight and exercise her numbers will return to their previous normal levels  in .      It is my pleasure to assist in the care of Carmen Perez.  I will see her again on a p.r.n. basis.  She will discuss pulmonary consultation with her primary care physician.  She will then discuss pulmonary rehabilitation with the pulmonologist.  I appreciate the privilege of assisting in this patient's care.      Corrine Hicks MD       cc:   [place] [address]         CORRINE HICKS MD, [facility]             D: 2017 12:11   T: 2017 13:51   MT: MARY JANE      Name:     CARMEN PEREZ   MRN:      [id number]        Account:      [id number]   :      1950           Service Date: 2017      Document: [id number]

## 2017-12-19 NOTE — MR AVS SNAPSHOT
"              After Visit Summary   12/19/2017    Carmen Perez    MRN: [id number]           Patient Information     Date Of Birth          1950        Visit Information        Provider Department      12/19/2017 11:15 AM Zachariah Flor MD [facility]        Today's Diagnoses     Panlobular emphysema (H)    -  1    Pulmonary HTN - probably secondary to COPD per cardiology           Follow-ups after your visit        Who to contact     If you have questions or need follow up information about today's clinic visit or your schedule please contact [facility] directly at [phone].  Normal or non-critical lab and imaging results will be communicated to you by Pervaciohart, letter or phone within 4 business days after the clinic has received the results. If you do not hear from us within 7 days, please contact the clinic through Pervaciohart or phone. If you have a critical or abnormal lab result, we will notify you by phone as soon as possible.  Submit refill requests through Boardwalktech or call your pharmacy and they will forward the refill request to us. Please allow 3 business days for your refill to be completed.          Additional Information About Your Visit        MyChart Information     Boardwalktech gives you secure access to your electronic health record. If you see a primary care provider, you can also send messages to your care team and make appointments. If you have questions, please call your primary care clinic.  If you do not have a primary care provider, please call [phone] and they will assist you.        Care EveryWhere ID     This is your Care EveryWhere ID. This could be used by other organizations to access your [place] medical records  [id number]        Your Vitals Were     Pulse Height Pulse Oximetry Breastfeeding? BMI (Body Mass Index)       100 1.626 m (5' 4\") 94% No 29.7 kg/m2        Blood Pressure from Last 3 " Encounters:   12/19/17 123/81   10/30/17 118/78   10/02/17 128/84    Weight from Last 3 Encounters:   12/19/17 78.5 kg (173 lb)   10/30/17 78 kg (172 lb)   10/02/17 78.5 kg (173 lb)              Today, you had the following     No orders found for display         Today's Medication Changes          These changes are accurate as of: 12/19/17 11:55 AM.  If you have any questions, ask your nurse or doctor.               Stop taking these medicines if you haven't already. Please contact your care team if you have questions.     lisinopril 2.5 MG tablet   Commonly known as:  PRINIVIL/Zestril   Stopped by:  Zachariah Flor MD                    Primary Care Provider Office Phone # Fax #    Keerthi Josefa Danielson PA-C [phone]       [address] [address]        Equal Access to Services     VASYL RICHARDSON AH: Hadii aad ku hadasho Soomaali, waaxda luqadaha, qaybta kaalmada adeegyada, waxay idiin hayaan clau khchristiano sellers . So [facility] [phone].    ATENCIÓN: Si habla español, tiene a parikh disposición servicios gratuitos de asistencia lingüística. Llame al [phone].    We comply with applicable federal civil rights laws and [place] laws. We do not discriminate on the basis of race, color, national origin, age, disability, sex, sexual orientation, or gender identity.            Thank you!     Thank you for choosing [facility]  for your care. Our goal is always to provide you with excellent care. Hearing back from our patients is one way we can continue to improve our services. Please take a few minutes to complete the written survey that you may receive in the mail after your visit with us. Thank you!             Your Updated Medication List - Protect others around you: Learn how to safely use, store and throw away your medicines at www.disposemymeds.org.          This list is accurate as of: 12/19/17 11:55 AM.  Always  use your most recent med list.                   Brand Name Dispense Instructions for use Diagnosis    acetaminophen 325 MG tablet    TYLENOL    100 tablet    Take 2 tablets (650 mg) by mouth every 6 hours as needed for mild pain        ARB NOT PRESCRIBED (INTENTIONAL)      1 each daily ARB not prescribed due to Other:low BP    COPD (chronic obstructive pulmonary disease) (H)       fluticasone-salmeterol 250-50 MCG/DOSE diskus inhaler    ADVAIR DISKUS    1 Inhaler    Inhale 1 puff into the lungs 2 times daily    Chronic obstructive pulmonary disease, unspecified COPD type (H)       furosemide 20 MG tablet    LASIX    90 tablet    TAKE 1 TABLET(20 MG) BY MOUTH DAILY    Congestive heart failure, unspecified congestive heart failure chronicity, unspecified congestive heart failure type (H)       * ipratropium - albuterol 0.5 mg/2.5 mg/3 mL 0.5-2.5 (3) MG/3ML neb solution    DUONEB    90 vial    Take 1 vial (3 mLs) by nebulization every 6 hours as needed for shortness of breath / dyspnea or wheezing    Chronic obstructive pulmonary disease with acute exacerbation (H)       * Ipratropium-Albuterol  MCG/ACT inhaler    COMBIVENT RESPIMAT    1 Inhaler    Inhale 1 puff into the lungs 4 times daily as needed for shortness of breath / dyspnea or wheezing    Chronic obstructive pulmonary disease, unspecified COPD type (H)       * Notice:  This list has 2 medication(s) that are the same as other medications prescribed for you. Read the directions carefully, and ask your doctor or other care provider to review them with you.

## 2017-12-19 NOTE — PROGRESS NOTES
HPI and Plan:   See dictation:[id number]    No orders of the defined types were placed in this encounter.      No orders of the defined types were placed in this encounter.      Medications Discontinued During This Encounter   Medication Reason     lisinopril (PRINIVIL/ZESTRIL) 2.5 MG tablet          Encounter Diagnoses   Name Primary?     Panlobular emphysema (H) Yes     Pulmonary HTN - probably secondary to COPD per cardiology        CURRENT MEDICATIONS:  Current Outpatient Prescriptions   Medication Sig Dispense Refill     fluticasone-salmeterol (ADVAIR DISKUS) 250-50 MCG/DOSE diskus inhaler Inhale 1 puff into the lungs 2 times daily 1 Inhaler 3     Ipratropium-Albuterol (COMBIVENT RESPIMAT)  MCG/ACT inhaler Inhale 1 puff into the lungs 4 times daily as needed for shortness of breath / dyspnea or wheezing 1 Inhaler 1     furosemide (LASIX) 20 MG tablet TAKE 1 TABLET(20 MG) BY MOUTH DAILY 90 tablet 1     acetaminophen (TYLENOL) 325 MG tablet Take 2 tablets (650 mg) by mouth every 6 hours as needed for mild pain 100 tablet 0     ipratropium - albuterol 0.5 mg/2.5 mg/3 mL (DUONEB) 0.5-2.5 (3) MG/3ML nebulization Take 1 vial (3 mLs) by nebulization every 6 hours as needed for shortness of breath / dyspnea or wheezing 90 vial 1     ARB NOT PRESCRIBED, INTENTIONAL, 1 each daily ARB not prescribed due to Other:low BP  0       ALLERGIES   No Known Allergies    PAST MEDICAL HISTORY:  Past Medical History:   Diagnosis Date     Asthma     worse in the summer     COPD (chronic obstructive pulmonary disease) (H)     previous smoker     Cor pulmonale (H)      Mild major depression (H)      Myocardial infarction     on furosemide       PAST SURGICAL HISTORY:  Past Surgical History:   Procedure Laterality Date      SECTION       HERNIORRHAPHY INCISIONAL (LOCATION) N/A 2016    Procedure: HERNIORRHAPHY INCISIONAL (LOCATION);  Surgeon: Bronson Ornelas MD;  Location: [facility] OR     LAPAROSCOPIC ASSISTED COLECTOMY  "N/A 2016    Procedure: LAPAROSCOPIC ASSISTED COLECTOMY;  Surgeon: Bronson Ornelas MD;  Location:  OR     LAPAROSCOPIC ASSISTED COLOSTOMY N/A 2016    Procedure: LAPAROSCOPIC ASSISTED COLOSTOMY;  Surgeon: Bronson Ornelas MD;  Location:  OR     LAPAROSCOPIC TUBAL LIGATION         FAMILY HISTORY:  Family History   Problem Relation Age of Onset     C.A.D. Mother       in her 60's       SOCIAL HISTORY:  Social History     Social History     Marital status: Single     Spouse name: N/A     Number of children: N/A     Years of education: N/A     Occupational History     sales [employer]     Social History Main Topics     Smoking status: Former Smoker     Packs/day: 0.25     Types: Cigarettes     Quit date: 2012     Smokeless tobacco: Never Used     Alcohol use No     Drug use: No     Sexual activity: No     Other Topics Concern     None     Social History Narrative       Review of Systems:  Skin:  Negative       Eyes:  Positive for glasses    ENT:  Positive for postnasal drainage    Respiratory:  Positive for cough;shortness of breath;dyspnea on exertion     Cardiovascular:  Negative      Gastroenterology: Negative      Genitourinary:  Negative      Musculoskeletal:  Positive for joint pain both shoulders- rotator cuff pain  Neurologic:  Negative      Psychiatric:  Negative      Heme/Lymph/Imm:  Positive for hay fever    Endocrine:  Negative        Physical Exam:  Vitals: /81 (BP Location: Right arm, Patient Position: Sitting, Cuff Size: Adult Regular)  Pulse 100  Ht 1.626 m (5' 4\")  Wt 78.5 kg (173 lb)  SpO2 94%  Breastfeeding? No  BMI 29.7 kg/m2    Constitutional:  cooperative, alert and oriented, well developed, well nourished, in no acute distress        Skin:  warm and dry to the touch, no apparent skin lesions or masses noted          Head:  normocephalic, no masses or lesions        Eyes:  pupils equal and round, conjunctivae and lids unremarkable, sclera white, no " xanthalasma, EOMS intact, no nystagmus        Lymph:      ENT:  no pallor or cyanosis, dentition good        Neck:  carotid pulses are full and equal bilaterally, JVP normal, no carotid bruit        Respiratory:  clear to auscultation prolonged expiration;diminished breath sounds bilaterally       Cardiac: regular rhythm, normal S1/S2, no S3 or S4, apical impulse not displaced, no murmurs, gallops or rubs tachycardic              pulses full and equal, no bruits auscultated                                        GI:  abdomen soft, non-tender, BS normoactive, no mass, no HSM, no bruits        Extremities and Muscular Skeletal:  no deformities, clubbing, cyanosis, erythema observed;no edema              Neurological:  no gross motor deficits;affect appropriate   Too dyspneic to walk so pushed in a wheel chair    Psych:  Alert and Oriented x 3        CC  Vicky Rodríguez PA-C  [address] [place] [address]

## 2017-12-19 NOTE — LETTER
12/19/2017      Keerthi Danielson PA-C  [address] [address]      RE: Carmen Perez       Dear Colleague,    I had the pleasure of seeing Carmen Perez in the [facility].    PRIMARY CARE PROVIDER:  YAMIL Yancey      HISTORY OF PRESENT ILLNESS:  I had the pleasure of seeing your patient, Carmen Perez, at [facility] for evaluation of shortness of breath and previous pulmonary hypertension.  This patient was last seen within our office by Dr. Eva Keller on 09/10/2012.  The patient was admitted to the hospital in June of that year for dyspnea, pneumonia and severe COPD.  She had a significant smoking history.  Her troponin levels were mildly elevated.  A transthoracic echocardiogram demonstrated normal left ventricular systolic function without regional wall motion abnormalities.  There was evidence of RV pressure overload with severe dilatation of the right ventricle and mildly decreased right ventricular systolic function.  Mild pulmonary hypertension was present with RVSP of 34 mmHg plus right atrial pressure.  A dobutamine stress echo was then performed on 06/13/2012 demonstrating no evidence of ischemia.  The patient was seen in pulmonary consultation.  A right heart catheterization was considered but not performed.  Pulmonary function tests in 07/2012 confirmed severe COPD and the patient was started on Dulera.  The patient has not smoked since that time.  She does not use home oxygen.  She was exercising more vigorously.  Unfortunately in 05/2016 the patient developed colonic rupture due to diverticulitis.  Laparoscopic-assisted colectomy and colostomy was then performed.  The patient has done very little exercise since that time and gained considerable weight.  She denies PND or orthopnea but does have some occasional peripheral edema for which she uses furosemide.  The patient during that hospitalization also  fell and fractured 2 cervical vertebrae.  This has healed for the most part.  The patient has never had a myocardial infarction or stroke.  She has hyperlipidemia which is currently not being treated.      PHYSICAL EXAMINATION:  As listed below.        Transthoracic echocardiogram performed on 11/01/2017 demonstrates normal left ventricular size and function.  The right ventricle is mildly to moderately dilated but there is normal systolic function.  There is trivial pericardial effusion.  RVSP has normalized from the previous echo to 22 mmHg.  There is mild aortic root dilatation to 3.9 cm and ascending aortic diameter dilatation to 3.8 cm.      On 10/30/2017 total cholesterol 274, HDL 70,  and triglycerides 179.  Comparison of weight from 09/07/2016 at 135 to current 173 is a 38-pound weight gain.      ASSESSMENT:   1.  Carmen Perez is a delightful 67-year-old female with severe COPD who presents for significant dyspnea on exertion and shortness of breath.  Her pulmonary hypertension has improved since 2012 with the discontinuation of her smoking.  Her right ventricle remains mild to moderately dilated but there is normal systolic function.  The patient's shortness of breath is not related to her heart but instead her lungs.  Her COPD is significantly restricting her.  I have asked this patient to obtain a followup consultation with a pulmonologist.  She will discuss this with her primary care physician.  Additionally I believe this patient should be enrolled in pulmonary rehabilitation for strengthening purposes.  I would discontinue her lisinopril since this dose is probably not effective in treating pulmonary hypertension anyway.  Her pulmonary pressures have now normalized.   2.  It is unclear to me why this patient is using furosemide but I think if she has evidence of venous insufficiency and significant edema, use of low-dose furosemide is fine.  The patient's inferior vena cava is not dilated and  there is no suggestion of right heart failure.   3.  The patient's LDL cholesterol is elevated as are her triglycerides.  I have suggested that if this patient can lose some weight and exercise her numbers will return to their previous normal levels in 2015.      It is my pleasure to assist in the care of Carmen Perez.  I will see her again on a p.r.n. basis.  She will discuss pulmonary consultation with her primary care physician.  She will then discuss pulmonary rehabilitation with the pulmonologist.  I appreciate the privilege of assisting in this patient's care.       Outpatient Encounter Prescriptions as of 12/19/2017   Medication Sig Dispense Refill     fluticasone-salmeterol (ADVAIR DISKUS) 250-50 MCG/DOSE diskus inhaler Inhale 1 puff into the lungs 2 times daily 1 Inhaler 3     Ipratropium-Albuterol (COMBIVENT RESPIMAT)  MCG/ACT inhaler Inhale 1 puff into the lungs 4 times daily as needed for shortness of breath / dyspnea or wheezing 1 Inhaler 1     furosemide (LASIX) 20 MG tablet TAKE 1 TABLET(20 MG) BY MOUTH DAILY 90 tablet 1     acetaminophen (TYLENOL) 325 MG tablet Take 2 tablets (650 mg) by mouth every 6 hours as needed for mild pain 100 tablet 0     ipratropium - albuterol 0.5 mg/2.5 mg/3 mL (DUONEB) 0.5-2.5 (3) MG/3ML nebulization Take 1 vial (3 mLs) by nebulization every 6 hours as needed for shortness of breath / dyspnea or wheezing 90 vial 1     ARB NOT PRESCRIBED, INTENTIONAL, 1 each daily ARB not prescribed due to Other:low BP  0     [DISCONTINUED] lisinopril (PRINIVIL/ZESTRIL) 2.5 MG tablet TAKE 1 TABLET(2.5 MG) BY MOUTH DAILY 90 tablet 0     No facility-administered encounter medications on file as of 12/19/2017.      Again, thank you for allowing me to participate in the care of your patient.      Sincerely,    Zachariah Flor MD     [facility]    cc:   Vicky Rodríguez PA-C  [address] [place] [address]

## 2017-12-20 ENCOUNTER — TELEPHONE (OUTPATIENT)
Dept: FAMILY MEDICINE | Facility: CLINIC | Age: 67
End: 2017-12-20

## 2017-12-20 DIAGNOSIS — R06.00 DYSPNEA, UNSPECIFIED TYPE: ICD-10-CM

## 2017-12-20 DIAGNOSIS — I27.20 PULMONARY HTN (H): ICD-10-CM

## 2017-12-20 DIAGNOSIS — J43.1 PANLOBULAR EMPHYSEMA (H): Primary | ICD-10-CM

## 2017-12-20 DIAGNOSIS — Z87.891 FORMER SMOKER: ICD-10-CM

## 2017-12-20 NOTE — TELEPHONE ENCOUNTER
Vicky- Patient is calling requesting a referral to [facility]. She wanted this message to come to you. Please advise. Thanks    Jacklyn Melchor  Referral Coordinator

## 2017-12-20 NOTE — TELEPHONE ENCOUNTER
In agreement with her cardiology consult where this was suggested and new referral given for [facility] as noted below.  Electronically Signed By: Vicky Rodríguez PA-C

## 2018-01-03 DIAGNOSIS — J44.9 CHRONIC OBSTRUCTIVE PULMONARY DISEASE, UNSPECIFIED COPD TYPE (H): ICD-10-CM

## 2018-01-03 NOTE — TELEPHONE ENCOUNTER
Requested Prescriptions   Pending Prescriptions Disp Refills     COMBIVENT RESPIMAT  MCG/ACT inhaler [Pharmacy Med Name: COMBIVENT RESPIMAT ORAL 120SPRAY 4G]  Last Written Prescription Date:  10/2/2017  Last Fill Quantity: 1 Inhaler,  # refills: 1   Last Office Visit with [facility], P or [facility] prescribing provider:  10/30/2017   Future Office Visit:      4 g 0     Sig: INHALE 1 PUFF INTO THE LUNGS FOUR TIMES DAILY AS NEEDED FOR SHORTNESS OF BREATH OR DIFFICULT BREATHING OR WHEEZING    Asthma Maintenance Inhalers - Anticholinergics Failed    1/3/2018 10:55 AM       Failed - Asthma control test score is 20 or greater in last 6 months    Please review ACT score.   No flowsheet data found.         Passed - Patient is age 12 years or older       Passed - Recent (6 mo) or future visit with authorizing provider's specialty    Patient had office visit in the last 6 months or has a visit in the next 30 days with authorizing provider.  See chart review.

## 2018-01-05 NOTE — TELEPHONE ENCOUNTER
pT would like it today if possible. Please call her to let know if this can be done.   [place] [facility] [place] [facility] [place]  [phone] & [place] to [facility] on this line.

## 2018-01-07 RX ORDER — IPRATROPIUM BROMIDE AND ALBUTEROL 20; 100 UG/1; UG/1
SPRAY, METERED RESPIRATORY (INHALATION)
Qty: 4 G | Refills: 1 | Status: SHIPPED | OUTPATIENT
Start: 2018-01-07 | End: 2019-02-04

## 2018-02-07 ENCOUNTER — TRANSFERRED RECORDS (OUTPATIENT)
Dept: HEALTH INFORMATION MANAGEMENT | Facility: CLINIC | Age: 68
End: 2018-02-07

## 2018-02-19 ENCOUNTER — TELEPHONE (OUTPATIENT)
Dept: FAMILY MEDICINE | Facility: CLINIC | Age: 68
End: 2018-02-19

## 2018-02-19 NOTE — TELEPHONE ENCOUNTER
Pt called in to clinic asking for help as she was in to see pulmonology and was told they would be ordering home oxygen for her but that was almost 2 weeks ago and she has not heard from anyone as of yet.  Placed call to [facility] as I can see in chart they have delivered to her in the past.  They did not have order on file for her.  Called [facility] at [phone] and was told her order was just placed Friday PM and faxed to [facility] at fax [phone] (phone [phone]).  Called pt to let her know this order was just placed and they should be calling her soon to arrange delivery.  She stated that when she was waiting for me to call her back, she did get a call from [facility] in [place] letting her know they were finalzing things and would call her in the next 1-2 days to arrange delivery.  I let pt know I would enter this info in her chart for future reference if needed.  Crystal Valdivia

## 2018-06-13 ENCOUNTER — TELEPHONE (OUTPATIENT)
Dept: FAMILY MEDICINE | Facility: CLINIC | Age: 68
End: 2018-06-13

## 2018-06-13 NOTE — TELEPHONE ENCOUNTER
Order received and faxed to [facility]  To [phone].  Clarita Cevallos MA

## 2018-06-13 NOTE — TELEPHONE ENCOUNTER
Date Forms was received: June 13, 2018    Forms received by: Fax    Purpose of Form:  [employer]    When the form is due:  ASAP    How the form needs to be returned for patient:  Fax    Form currently placed  LT inbox

## 2018-06-21 NOTE — TELEPHONE ENCOUNTER
Rcvd updated form to be signed and faxed.  Placed in LT, PA-C box for signature.  Crystal Valdivia

## 2018-07-01 DIAGNOSIS — I50.9 CONGESTIVE HEART FAILURE, UNSPECIFIED CONGESTIVE HEART FAILURE CHRONICITY, UNSPECIFIED CONGESTIVE HEART FAILURE TYPE: ICD-10-CM

## 2018-07-02 RX ORDER — FUROSEMIDE 20 MG
TABLET ORAL
Qty: 90 TABLET | Refills: 1 | Status: SHIPPED | OUTPATIENT
Start: 2018-07-02 | End: 2019-01-02

## 2018-07-02 NOTE — TELEPHONE ENCOUNTER
Prescription approved per [facility] Refill Protocol.  Ayaka Estes, RN, BSN  [facility]

## 2018-07-02 NOTE — TELEPHONE ENCOUNTER
"Requested Prescriptions   Pending Prescriptions Disp Refills     furosemide (LASIX) 20 MG tablet [Pharmacy Med Name: FUROSEMIDE 20MG TABLETS]  Last Written Prescription Date:  10/2/2017  Last Fill Quantity: 90 tablet,  # refills: 1   Last office visit: 10/30/2017 with prescribing provider:  Anne   Future Office Visit:       90 tablet 0     Sig: TAKE 1 TABLET(20 MG) BY MOUTH DAILY    Diuretics (Including Combos) Protocol Passed    7/1/2018 10:51 AM       Passed - Blood pressure under 140/90 in past 12 months    BP Readings from Last 3 Encounters:   12/19/17 123/81   10/30/17 118/78   10/02/17 128/84            Passed - Recent (12 mo) or future (30 days) visit within the authorizing provider's specialty    Patient had office visit in the last 12 months or has a visit in the next 30 days with authorizing provider or within the authorizing provider's specialty.  See \"Patient Info\" tab in inbasket, or \"Choose Columns\" in Meds & Orders section of the refill encounter.           Passed - Patient is age 18 or older       Passed - No active pregancy on record       Passed - Normal serum creatinine on file in past 12 months    Recent Labs   Lab Test  10/30/17   1210   CR  0.78             Passed - Normal serum potassium on file in past 12 months    Recent Labs   Lab Test  10/30/17   1210   POTASSIUM  4.4             Passed - Normal serum sodium on file in past 12 months    Recent Labs   Lab Test  10/30/17   1210   NA  140             Passed - No positive pregnancy test in past 12 months          "

## 2018-12-12 ENCOUNTER — TELEPHONE (OUTPATIENT)
Dept: FAMILY MEDICINE | Facility: CLINIC | Age: 68
End: 2018-12-12

## 2018-12-12 ENCOUNTER — MEDICAL CORRESPONDENCE (OUTPATIENT)
Dept: HEALTH INFORMATION MANAGEMENT | Facility: CLINIC | Age: 68
End: 2018-12-12

## 2018-12-12 NOTE — TELEPHONE ENCOUNTER
Date Forms was received: December 12, 2018    Forms received by: Fax    Last office visit: 10/30/2017    Purpose of Form:  [employer] colostomy supplies    When the form is due:  ASAP    How the form needs to be returned for patient:  Fax    Form currently placed  LH inbox

## 2018-12-12 NOTE — TELEPHONE ENCOUNTER
Please call pt, she needs to be seen as it's been over 1 yr since I saw her last. Form completed, but needs to have follow-up appt. Placed in TC box.  Electronically Signed By: Vicky Ye PA-C

## 2018-12-12 NOTE — TELEPHONE ENCOUNTER
Form faxed.  Called pt and spoke with her.  She needs to see if anyone can bring her to an appt as she no longer drives.  She asked I call her after the New Year as her son hs a lot going on.  I told her I will call in January to see how things are going and see about getting her an appt.  Crystal Valdivia

## 2019-01-02 DIAGNOSIS — I50.9 CONGESTIVE HEART FAILURE (H): ICD-10-CM

## 2019-01-02 RX ORDER — FUROSEMIDE 20 MG
TABLET ORAL
Qty: 30 TABLET | Refills: 0 | Status: SHIPPED | OUTPATIENT
Start: 2019-01-02 | End: 2019-02-04

## 2019-01-02 RX ORDER — FUROSEMIDE 20 MG
TABLET ORAL
Qty: 90 TABLET | Refills: 0
Start: 2019-01-02

## 2019-01-02 NOTE — TELEPHONE ENCOUNTER
"Requested Prescriptions   Pending Prescriptions Disp Refills     furosemide (LASIX) 20 MG tablet [Pharmacy Med Name: FUROSEMIDE 20MG TABLETS]  Last Written Prescription Date:  7/2/2018  Last Fill Quantity: 90 tablet,  # refills: 1   Last office visit: 10/30/2017 with prescribing provider:  Cassi   Future Office Visit:       90 tablet 0     Sig: TAKE 1 TABLET(20 MG) BY MOUTH DAILY    Diuretics (Including Combos) Protocol Failed - 1/2/2019  9:16 AM       Failed - Blood pressure under 140/90 in past 12 months    BP Readings from Last 3 Encounters:   12/19/17 123/81   10/30/17 118/78   10/02/17 128/84            Failed - Recent (12 mo) or future (30 days) visit within the authorizing provider's specialty    Patient had office visit in the last 12 months or has a visit in the next 30 days with authorizing provider or within the authorizing provider's specialty.  See \"Patient Info\" tab in inbasket, or \"Choose Columns\" in Meds & Orders section of the refill encounter.             Failed - Normal serum creatinine on file in past 12 months    Recent Labs   Lab Test 10/30/17  1210   CR 0.78             Failed - Normal serum potassium on file in past 12 months    Recent Labs   Lab Test 10/30/17  1210   POTASSIUM 4.4             Failed - Normal serum sodium on file in past 12 months    Recent Labs   Lab Test 10/30/17  1210                Passed - Patient is age 18 or older       Passed - No active pregancy on record       Passed - No positive pregnancy test in past 12 months          "

## 2019-01-02 NOTE — TELEPHONE ENCOUNTER
This is duplicate and was filled today. Patient needs to be seen for 90 day supply and any further refills.  Marilyn Benavidez R.N.

## 2019-01-02 NOTE — TELEPHONE ENCOUNTER
"Requested Prescriptions   Pending Prescriptions Disp Refills     furosemide (LASIX) 20 MG tablet [Pharmacy Med Name: FUROSEMIDE 20MG TABLETS]  Last Written Prescription Date:  1/2/2019  Last Fill Quantity: 30 tablet,  # refills: 0   Last office visit: 10/30/2017 with prescribing provider:  Cassi     Future Office Visit:       90 tablet 0     Sig: TAKE 1 TABLET BY MOUTH EVERY DAY    Diuretics (Including Combos) Protocol Failed - 1/2/2019  2:11 PM       Failed - Blood pressure under 140/90 in past 12 months    BP Readings from Last 3 Encounters:   12/19/17 123/81   10/30/17 118/78   10/02/17 128/84                Failed - Recent (12 mo) or future (30 days) visit within the authorizing provider's specialty    Patient had office visit in the last 12 months or has a visit in the next 30 days with authorizing provider or within the authorizing provider's specialty.  See \"Patient Info\" tab in inbasket, or \"Choose Columns\" in Meds & Orders section of the refill encounter.             Failed - Normal serum creatinine on file in past 12 months    Recent Labs   Lab Test 10/30/17  1210   CR 0.78             Failed - Normal serum potassium on file in past 12 months    Recent Labs   Lab Test 10/30/17  1210   POTASSIUM 4.4               Failed - Normal serum sodium on file in past 12 months    Recent Labs   Lab Test 10/30/17  1210                Passed - Patient is age 18 or older       Passed - No active pregancy on record       Passed - No positive pregnancy test in past 12 months          "

## 2019-01-02 NOTE — TELEPHONE ENCOUNTER
Medication is being filled for 1 time refill only due to:  Patient needs to be seen because it has been more than one year since last visit. Needs OV. Will route to  to call [facility] to schedule. Marilyn Benavidez R.N.

## 2019-02-01 ASSESSMENT — ACTIVITIES OF DAILY LIVING (ADL)
CURRENT_FUNCTION: SHOPPING REQUIRES ASSISTANCE
CURRENT_FUNCTION: TRANSPORTATION REQUIRES ASSISTANCE
CURRENT_FUNCTION: HOUSEWORK REQUIRES ASSISTANCE

## 2019-02-04 ENCOUNTER — OFFICE VISIT (OUTPATIENT)
Dept: FAMILY MEDICINE | Facility: CLINIC | Age: 69
End: 2019-02-04
Payer: MEDICARE

## 2019-02-04 VITALS
BODY MASS INDEX: 30.73 KG/M2 | DIASTOLIC BLOOD PRESSURE: 72 MMHG | HEART RATE: 107 BPM | OXYGEN SATURATION: 94 % | TEMPERATURE: 98.3 F | SYSTOLIC BLOOD PRESSURE: 104 MMHG | WEIGHT: 180 LBS | HEIGHT: 64 IN

## 2019-02-04 DIAGNOSIS — Z13.1 SCREENING FOR DIABETES MELLITUS: ICD-10-CM

## 2019-02-04 DIAGNOSIS — R60.0 BILATERAL LOWER EXTREMITY EDEMA: ICD-10-CM

## 2019-02-04 DIAGNOSIS — Z23 NEED FOR PROPHYLACTIC VACCINATION AND INOCULATION AGAINST INFLUENZA: ICD-10-CM

## 2019-02-04 DIAGNOSIS — Z93.3 S/P COLOSTOMY (H): ICD-10-CM

## 2019-02-04 DIAGNOSIS — J44.9 COPD, SEVERE (H): ICD-10-CM

## 2019-02-04 DIAGNOSIS — Z00.00 ROUTINE HISTORY AND PHYSICAL EXAMINATION OF ADULT: Primary | ICD-10-CM

## 2019-02-04 DIAGNOSIS — Z87.891 FORMER SMOKER: ICD-10-CM

## 2019-02-04 DIAGNOSIS — I50.30 (HFPEF) HEART FAILURE WITH PRESERVED EJECTION FRACTION (H): ICD-10-CM

## 2019-02-04 DIAGNOSIS — J43.1 PANLOBULAR EMPHYSEMA (H): ICD-10-CM

## 2019-02-04 DIAGNOSIS — F32.0 MILD MAJOR DEPRESSION (H): ICD-10-CM

## 2019-02-04 DIAGNOSIS — Z13.6 CARDIOVASCULAR SCREENING; LDL GOAL LESS THAN 160: ICD-10-CM

## 2019-02-04 DIAGNOSIS — I27.20 PULMONARY HTN (H): ICD-10-CM

## 2019-02-04 LAB
ERYTHROCYTE [DISTWIDTH] IN BLOOD BY AUTOMATED COUNT: 13.4 % (ref 10–15)
HCT VFR BLD AUTO: 38.1 % (ref 35–47)
HGB BLD-MCNC: 11.4 G/DL (ref 11.7–15.7)
MCH RBC QN AUTO: 26 PG (ref 26.5–33)
MCHC RBC AUTO-ENTMCNC: 29.9 G/DL (ref 31.5–36.5)
MCV RBC AUTO: 87 FL (ref 78–100)
PLATELET # BLD AUTO: 321 10E9/L (ref 150–450)
RBC # BLD AUTO: 4.38 10E12/L (ref 3.8–5.2)
WBC # BLD AUTO: 7.5 10E9/L (ref 4–11)

## 2019-02-04 PROCEDURE — 90662 IIV NO PRSV INCREASED AG IM: CPT | Performed by: PHYSICIAN ASSISTANT

## 2019-02-04 PROCEDURE — 36415 COLL VENOUS BLD VENIPUNCTURE: CPT | Performed by: PHYSICIAN ASSISTANT

## 2019-02-04 PROCEDURE — G0008 ADMIN INFLUENZA VIRUS VAC: HCPCS | Performed by: PHYSICIAN ASSISTANT

## 2019-02-04 PROCEDURE — G0439 PPPS, SUBSEQ VISIT: HCPCS | Performed by: PHYSICIAN ASSISTANT

## 2019-02-04 PROCEDURE — 85027 COMPLETE CBC AUTOMATED: CPT | Performed by: PHYSICIAN ASSISTANT

## 2019-02-04 PROCEDURE — 99214 OFFICE O/P EST MOD 30 MIN: CPT | Mod: 25 | Performed by: PHYSICIAN ASSISTANT

## 2019-02-04 PROCEDURE — 80053 COMPREHEN METABOLIC PANEL: CPT | Performed by: PHYSICIAN ASSISTANT

## 2019-02-04 PROCEDURE — 80061 LIPID PANEL: CPT | Performed by: PHYSICIAN ASSISTANT

## 2019-02-04 RX ORDER — VENLAFAXINE HYDROCHLORIDE 37.5 MG/1
37.5 CAPSULE, EXTENDED RELEASE ORAL DAILY
Qty: 90 CAPSULE | Refills: 0 | Status: SHIPPED | OUTPATIENT
Start: 2019-02-04 | End: 2019-05-06

## 2019-02-04 RX ORDER — FUROSEMIDE 20 MG
20 TABLET ORAL DAILY
Qty: 90 TABLET | Refills: 3 | Status: SHIPPED | OUTPATIENT
Start: 2019-02-04 | End: 2020-02-04

## 2019-02-04 ASSESSMENT — PATIENT HEALTH QUESTIONNAIRE - PHQ9
SUM OF ALL RESPONSES TO PHQ QUESTIONS 1-9: 6
5. POOR APPETITE OR OVEREATING: NOT AT ALL

## 2019-02-04 ASSESSMENT — ANXIETY QUESTIONNAIRES
5. BEING SO RESTLESS THAT IT IS HARD TO SIT STILL: NOT AT ALL
1. FEELING NERVOUS, ANXIOUS, OR ON EDGE: NOT AT ALL
2. NOT BEING ABLE TO STOP OR CONTROL WORRYING: NOT AT ALL
7. FEELING AFRAID AS IF SOMETHING AWFUL MIGHT HAPPEN: NOT AT ALL
6. BECOMING EASILY ANNOYED OR IRRITABLE: NOT AT ALL
GAD7 TOTAL SCORE: 0
3. WORRYING TOO MUCH ABOUT DIFFERENT THINGS: NOT AT ALL
IF YOU CHECKED OFF ANY PROBLEMS ON THIS QUESTIONNAIRE, HOW DIFFICULT HAVE THESE PROBLEMS MADE IT FOR YOU TO DO YOUR WORK, TAKE CARE OF THINGS AT HOME, OR GET ALONG WITH OTHER PEOPLE: NOT DIFFICULT AT ALL

## 2019-02-04 ASSESSMENT — MIFFLIN-ST. JEOR: SCORE: 1321.47

## 2019-02-04 NOTE — PROGRESS NOTES
"  SUBJECTIVE:   Carmen Perez is a 68 year old female who presents for Preventive Visit.    Answers for HPI/ROS submitted by the patient on 2/1/2019   Annual Exam:  In general, how would you rate your overall physical health?: poor  Frequency of exercise:: None  Do you usually eat at least 4 servings of fruit and vegetables a day, include whole grains & fiber, and avoid regularly eating high fat or \"junk\" foods? : No  Taking medications regularly:: Yes  Medication side effects:: None  Activities of Daily Living: transportation requires assistance, shopping requires assistance, housework requires assistance  Home safety: no safety concerns identified  Hearing Impairment:: no hearing concerns  In the past 6 months, have you been bothered by leaking of urine?: Yes  In general, how would you rate your overall mental or emotional health?: good  Additional concerns today:: YES -  Saw pt last 10/2017 - was referred to cardiology and pulmonology to help define her chronic dyspnea and SOB.   Cardiology felt that this was due to severe COPD and this was confirmed by pulmonology.  Pt was started on home oxygen and triple bronchodilator therapy - last consult note from [facility] reviewed. Pt had been on combivent and advair previously then had incruse added.   Pt was advised to return to pulmonology in 3-4 months, but she never did.  In fact, she tells me she was supposed to complete pulmonary rehab, but will not do this since any degree of bodily movement will make her feel like \"I'm going to die.\" She is also very concerned about the cost of this and doesn't want to be a burden to her son who brings her to appointments.  Reports she does not leave the house unless it's a holiday. Is currently living alone, but gets groceries delivered. Son visits her once per week and brings her food since he runs a restaurant.   She has developed her own system and feels she is doing fine on her own.   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  She requests that I refill her inhalers as she doesn't want to go back to pulmonology.    Wonders if she should be on something for her mood - took wellbutrin originally to help her quit smoking and it helped with that, but then stopped it because she had \"dark thoughts.\" D/c'd wellbutrin and thoughts resolved.  She reports she feels like her mood regression comes in \"cycles\" where she has some days where she is well and other days when she has issues again.       Are you in the first 12 months of your Medicare Part B coverage?  No    Mental Health:    In general, how would you rate your overall mental or emotional health? good  PHQ-2 Score: (P) 2    Do you feel safe in your environment? Yes    Do you have a Health Care Directive? Yes: Advance Directive has been received and scanned.    Additional concerns to address?  No    Fall risk:  Fallen 2 or more times in the past year?: No  Any fall with injury in the past year?: No  Cognitive Screenin) Repeat 3 items (Leader, Season, Table)    2) Clock draw: NORMAL  3) 3 item recall: Recalls 3 objects  Results: NORMAL clock, 1-2 items recalled: COGNITIVE IMPAIRMENT LESS LIKELY    Mini-CogTM Copyright S Henrik. Licensed by the author for use in [facility]; reprinted with permission (meg@.[facility]). All rights reserved.        Reviewed and updated as needed this visit by clinical staff  Tobacco  Allergies  Meds  Problems  Med Hx  Surg Hx  Fam Hx  Soc Hx          Reviewed and updated as needed this visit by Provider  Tobacco  Allergies  Meds  Problems  Med Hx  Surg Hx  Fam Hx  Soc Hx         Social History     Tobacco Use     Smoking status: Former Smoker     Packs/day: 0.25     Types: Cigarettes     Last attempt to quit: 2012     Years since quittin.6     Smokeless tobacco: Never Used   Substance Use Topics     Alcohol use: No     Alcohol/week: 0.0 oz                           Current providers sharing in care for " this patient include:   Patient Care Team:  Vicky Ye PA-C as PCP - General (Physician Assistant - Medical)  Keerthi Danielson PA-C as PCP - Assigned PCP  sEloise RN as Lead Care Coordinator (Primary Care - CC)  Care, [facility] ([facility] ([facility] ([place]  Lance Mims MD as MD (Pulmonary)  Levon FIGUEROA  as County Worker  [facility]  as Other (see comments)    The following health maintenance items are reviewed in Epic and correct as of today:  Health Maintenance   Topic Date Due     HF ACTION PLAN Q3 YR  1950     MEDICARE ANNUAL WELLNESS VISIT  06/07/1966     DEPRESSION ACTION PLAN  06/07/1966     ZOSTER IMMUNIZATION (2 of 3) 12/04/2013     COPD ACTION PLAN Q1 YR  06/09/2016     FIT Q1 YR  06/12/2016     ADVANCE DIRECTIVE PLANNING Q5 YRS  05/04/2017     DEXA Q2 YR  06/23/2017     MAMMO SCREEN Q2 YR (SYSTEM ASSIGNED)  02/10/2019 (Originally 9/21/2018)     BMP Q6 MOS  08/04/2019     PHQ-9 Q6 MONTHS  08/04/2019     ALT Q1 YR  02/04/2020     LIPID MONITORING Q1 YEAR  02/04/2020     CBC Q1 YR  02/04/2020     FALL RISK ASSESSMENT  02/05/2020     DTAP/TDAP/TD IMMUNIZATION (2 - Td) 07/10/2024     SPIROMETRY ONETIME  Completed     INFLUENZA VACCINE  Completed     PNEUMOCOCCAL IMMUNIZATION 65+ LOW/MEDIUM RISK  Completed     HEPATITIS C SCREENING  Completed     IPV IMMUNIZATION  Aged Out     MENINGITIS IMMUNIZATION  Aged Out     BP Readings from Last 3 Encounters:   02/04/19 104/72   12/19/17 123/81   10/30/17 118/78    Wt Readings from Last 3 Encounters:   02/04/19 81.6 kg (180 lb)   12/19/17 78.5 kg (173 lb)   10/30/17 78 kg (172 lb)                  Patient Active Problem List   Diagnosis     COPD, severe (H)     CARDIOVASCULAR SCREENING; LDL GOAL LESS THAN 160     Cor pulmonale (H)     (HFpEF) heart failure with preserved ejection fraction (H)     S/P colostomy (H) - since colonic perforation in 5/2016     Mild major depression (H)     Diverticulitis of large  intestine with perforation, unspecified bleeding status - 2016 requiring emergency colostomy placement.     History of cervical fracture - C2,C7 while in hospital following emergency surgery for perforated diverticula. Cleared by neurosurgery 2016. No chronic issues since.     Former smoker - 40 pack yr history     Panlobular emphysema (H)     Pulmonary HTN - probably secondary to COPD per cardiology     Dyspnea, unspecified type     Bilateral lower extremity edema     Past Surgical History:   Procedure Laterality Date      SECTION       HERNIORRHAPHY INCISIONAL (LOCATION) N/A 2016    Procedure: HERNIORRHAPHY INCISIONAL (LOCATION);  Surgeon: Bronson Ornelas MD;  Location:  OR     LAPAROSCOPIC ASSISTED COLECTOMY N/A 2016    Procedure: LAPAROSCOPIC ASSISTED COLECTOMY;  Surgeon: Bronson Ornelas MD;  Location:  OR     LAPAROSCOPIC ASSISTED COLOSTOMY N/A 2016    Procedure: LAPAROSCOPIC ASSISTED COLOSTOMY;  Surgeon: Bronson Ornelas MD;  Location:  OR     LAPAROSCOPIC TUBAL LIGATION         Social History     Tobacco Use     Smoking status: Former Smoker     Packs/day: 0.25     Types: Cigarettes     Last attempt to quit: 2012     Years since quittin.6     Smokeless tobacco: Never Used   Substance Use Topics     Alcohol use: No     Alcohol/week: 0.0 oz     Family History   Problem Relation Age of Onset     C.A.D. Mother          in her 60's         Current Outpatient Medications   Medication Sig Dispense Refill     ACE/ARB/ARNI NOT PRESCRIBED (INTENTIONAL) Please choose reason not prescribed, below       acetaminophen (TYLENOL) 325 MG tablet Take 2 tablets (650 mg) by mouth every 6 hours as needed for mild pain 100 tablet 0     calcium carb-cholecalciferol 600-500 MG-UNIT CAPS        fluticasone-salmeterol (ADVAIR DISKUS) 250-50 MCG/DOSE inhaler Inhale 1 puff into the lungs 2 times daily 1 Inhaler 2     furosemide (LASIX) 20 MG tablet Take 1 tablet (20 mg)  "by mouth daily 90 tablet 3     ipratropium - albuterol 0.5 mg/2.5 mg/3 mL (DUONEB) 0.5-2.5 (3) MG/3ML nebulization Take 1 vial (3 mLs) by nebulization every 6 hours as needed for shortness of breath / dyspnea or wheezing 90 vial 1     Ipratropium-Albuterol (COMBIVENT RESPIMAT)  MCG/ACT inhaler INHALE 1 PUFF INTO THE LUNGS FOUR TIMES DAILY AS NEEDED FOR SHORTNESS OF BREATH OR DIFFICULT BREATHING OR WHEEZING 4 g 1     umeclidinium (INCRUSE ELLIPTA) 62.5 MCG/INH inhaler Inhale 1 puff into the lungs daily 1 Inhaler 2     venlafaxine (EFFEXOR-XR) 37.5 MG 24 hr capsule Take 1 capsule (37.5 mg) by mouth daily 90 capsule 0     No Known Allergies     Pneumonia Vaccine: up to date  Mammogram Screening: Mammo discussed, not appropriate for or declined by this patient.    ROS:  Constitutional, HEENT, cardiovascular, pulmonary, GI, , musculoskeletal, neuro, skin, endocrine and psych systems are negative, except as otherwise noted.    OBJECTIVE:   /72 (BP Location: Right arm, Cuff Size: Adult Regular)   Pulse 107   Temp 98.3  F (36.8  C) (Oral)   Ht 1.626 m (5' 4\")   Wt 81.6 kg (180 lb)   SpO2 94%   BMI 30.90 kg/m   Estimated body mass index is 30.9 kg/m  as calculated from the following:    Height as of this encounter: 1.626 m (5' 4\").    Weight as of this encounter: 81.6 kg (180 lb).  EXAM:   GENERAL: pleasant 68 female sitting in wheelchair using portable O2.  EYES: Eyes grossly normal to inspection, PERRL and conjunctivae and sclerae normal  NECK: no adenopathy and no asymmetry, masses, or scars  RESP: lungs clear to auscultation - no rales, rhonchi or wheezes  CV: regular rates and rhythm, no murmur, click or rub. Trace bilateral peripheral edema  NEURO: Normal strength and tone, mentation intact and speech normal  PSYCH: mentation appears normal, affect normal/bright    Diagnostic Test Results:  See flowsheets for PHQ/THOMPSON scores.    ASSESSMENT / PLAN:       ICD-10-CM    1. Routine history and physical " examination of adult Z00.00    2. Need for prophylactic vaccination and inoculation against influenza Z23 FLU VACCINE, INCREASED ANTIGEN, PRESV FREE, AGE 65+ [81969]     Vaccine Administration, Initial [39056]   3. CARDIOVASCULAR SCREENING; LDL GOAL LESS THAN 160 Z13.6 Lipid panel reflex to direct LDL Fasting   4. Panlobular emphysema (H) J43.1 Ipratropium-Albuterol (COMBIVENT RESPIMAT)  MCG/ACT inhaler     fluticasone-salmeterol (ADVAIR DISKUS) 250-50 MCG/DOSE inhaler     umeclidinium (INCRUSE ELLIPTA) 62.5 MCG/INH inhaler     CBC with platelets     CARE COORDINATION REFERRAL     PULMONARY MEDICINE REFERRAL   5. Pulmonary HTN - probably secondary to COPD per cardiology I27.20 CBC with platelets     CARE COORDINATION REFERRAL     ACE/ARB/ARNI NOT PRESCRIBED (INTENTIONAL)   6. S/P colostomy (H) - since colonic perforation in 5/2016 Z93.3    7. Former smoker - 40 pack yr history Z87.891    8. Screening for diabetes mellitus Z13.1 Comprehensive metabolic panel   9. (HFpEF) heart failure with preserved ejection fraction (H) I50.30 ACE/ARB/ARNI NOT PRESCRIBED (INTENTIONAL)   10. Mild major depression (H) F32.0 venlafaxine (EFFEXOR-XR) 37.5 MG 24 hr capsule   11. Bilateral lower extremity edema R60.0 furosemide (LASIX) 20 MG tablet   12. COPD, severe (H) J44.9 Ipratropium-Albuterol (COMBIVENT RESPIMAT)  MCG/ACT inhaler     fluticasone-salmeterol (ADVAIR DISKUS) 250-50 MCG/DOSE inhaler     umeclidinium (INCRUSE ELLIPTA) 62.5 MCG/INH inhaler   A total of >60 minutes was spent with the patient today, with greater than 50% of the visit involving counseling and coordination of care regarding review of her concerns and chronic conditions since she has not been seen in over 1 year.  Discussed her last cardiology consult confirming that her chronic SOB was felt to be from severe COPD since heart failure showed preserved EF and ACEI dose not felt to be sufficient for treatment of pulmonary HTN so this was discontinued.  Given the ok to use lasix for intermittent LE edema and pt would like to stay on this as feels her breathing is better when she takes it. Reviewed importance of need for better follow-up as needs to have labs checked at a minimum of annually or more often especially with any abnormalities.  Pt requested I take over refilling her scripts for triple bronchodilator therapy, but given the severity of her COPD I do not feel this is appropriate and she still needs to be under pulmonology care especially since she requires supplemental oxygen. She is very concerned about cost and being a burden to her son so we came to an agreement that she would let me place a care coordination referral to see if she would qualify for additional financial resources and home care. She agreed to schedule with pulmonology in the Spring after the weather gets better which I do feel is reasonable so additional prescription granted for next 3 months.   Lastly patient reported additional concerns regarding her mood and did have PHQ score in mild range for depression. I suspect this is significantly contributed to by her being home-bound and severity of COPD as she reports she doesn't leave the house except for holidays. Different medications were reviewed and after risks/benefit of side effects discussed, pt would like to try effexor. Advised close follow-up via telephone visit to see how she is doing.  Pt in agreement with plan.       End of Life Planning:  Patient currently has an advanced directive: Yes.     COUNSELING:  Reviewed preventive health counseling, as reflected in patient instructions       Regular exercise       Healthy diet/nutrition       Immunizations    Vaccinated for: Influenza             Osteoporosis Prevention/Bone Health       Consider lung cancer screening for ages 55-80 years and 30 pack-year smoking history        Colon cancer screening    BP Readings from Last 1 Encounters:   02/04/19 104/72     Estimated body mass  "index is 30.9 kg/m  as calculated from the following:    Height as of this encounter: 1.626 m (5' 4\").    Weight as of this encounter: 81.6 kg (180 lb).      Weight management plan: Discussed healthy diet and exercise guidelines     reports that she quit smoking about 6 years ago. Her smoking use included cigarettes. She smoked 0.25 packs per day. she has never used smokeless tobacco.      Appropriate preventive services were discussed with this patient, including applicable screening as appropriate for cardiovascular disease, diabetes, osteopenia/osteoporosis, and glaucoma.  As appropriate for age/gender, discussed screening for colorectal cancer, prostate cancer, breast cancer, and cervical cancer. Checklist reviewing preventive services available has been given to the patient.    Reviewed patients plan of care and provided an AVS. The Complex Care Plan (for patients with higher acuity and needing more deliberate coordination of services) for Carmen meets the Care Plan requirement. This Care Plan has been established and reviewed with the Patient.    Counseling Resources:  ATP IV Guidelines  Pooled Cohorts Equation Calculator  Breast Cancer Risk Calculator  FRAX Risk Assessment  ICSI Preventive Guidelines  Dietary Guidelines for Americans, 2010  USDA's MyPlate  ASA Prophylaxis  Lung CA Screening    Vicky Ye PA-C  [facility] [place]  Injectable Influenza Immunization Documentation    1.  Is the person to be vaccinated sick today?   No    2. Does the person to be vaccinated have an allergy to a component   of the vaccine?   No  Egg Allergy Algorithm Link    3. Has the person to be vaccinated ever had a serious reaction   to influenza vaccine in the past?   No    4. Has the person to be vaccinated ever had Guillain-Barré syndrome?   No    Form completed by Clarita Cevallos MA             "

## 2019-02-04 NOTE — LETTER
My Heart Failure Action Plan   Name: Carmen Perez    YOB: 1950   Date: 2/7/2019    My doctor: Vicky Ye     [facility] (formerly [facility]3]     [address] [place], [place] [address]us [facility] [address] [phone]  My Diagnosis: Diastolic Heart Failure   My Ejection Fraction: Over 50%    My Exercise Goal: 30 minutes daily  .     My Weight Goal: to increase activity level and decrease weight back down to previous normal range  Wt Readings from Last 2 Encounters:   02/04/19 81.6 kg (180 lb)   12/19/17 78.5 kg (173 lb)     Weigh yourself daily using the same scale should you start noticing significant fluctuations in your weight. If you gain more than 2 pounds in 24 hours or 5 pounds in a week call the clinic    My Diet Goal: No added salt    Emergency Room Visits:    Our goal is to improve your quality of life and help you avoid a visit to the emergency room or hospital.  If we work together, we can achieve this goal. But, if you feel you need to call 911 or go to the emergency room, please do so.  If you go to the emergency room, please bring your list of medicines and your daily weight chart with you.       GREEN ZONE     Doing well today    Weight gained is no more than 2 pounds a day or 5 pounds a week.    No swelling in feet, ankles, legs or stomach.    No more swelling than usual.    No more trouble breathing than usual.    No change in my sleep.    No other problems. Actions:    I am doing fine.  I will take my medicine, follow my diet, see my doctor, exercise, and watch for symptoms.           YELLOW ZONE         Having a bad day or flare up    Weight gain of more than 2 pounds in one day or 5 pounds in one week.    New swelling in ankle, leg, knee or thigh.    Bloating in belly, pants feel tighter.    Swelling in hands or face.    Coughing or trouble breathing while walking or talking.    Harder to breathe last night.    Have trouble sleeping, wake up short of breath.    Much more tired than  usual.    Not eating.    Pain in my chest or bad leg cramps.    Feel weak or dizzy. Actions:    I need to take action and call my doctor or nurse today.                 RED ZONE         Need medical care now    Weight gain of 5 pounds overnight.    Chest pain or pressure that does not go away.    Feel less alert.    Wheezing or have trouble breathing when at rest.    Cannot sleep lying down.    Cannot take my water pill.    Pass out or faint. Actions:    I need to call my doctor or nurse now!    Call 911 if I have chest pain or cannot breathe.

## 2019-02-04 NOTE — LETTER
My Depression Action Plan  Name: Carmen Perez   Date of Birth 1950  Date: 2/8/2019    My doctor: Vicky Ye   My clinic: [facility] [place]  [address]  [place] [address] [phone]          GREEN    ZONE   Good Control    What it looks like:     Things are going generally well. You have normal up s and down s. You may even feel depressed from time to time, but bad moods usually last less than a day.   What you need to do:  1. Continue to care for yourself (see self care plan)  2. Check your depression survival kit and update it as needed  3. Follow your physician s recommendations including any medication.  4. Do not stop taking medication unless you consult with your physician first.           YELLOW         ZONE Getting Worse    What it looks like:     Depression is starting to interfere with your life.     It may be hard to get out of bed; you may be starting to isolate yourself from others.    Symptoms of depression are starting to last most all day and this has happened for several days.     You may have suicidal thoughts but they are not constant.   What you need to do:     1. Call your care team, your response to treatment will improve if you keep your care team informed of your progress. Yellow periods are signs an adjustment may need to be made.     2. Continue your self-care, even if you have to fake it!    3. Talk to someone in your support network    4. Open up your depression survival kit           RED    ZONE Medical Alert - Get Help    What it looks like:     Depression is seriously interfering with your life.     You may experience these or other symptoms: You can t get out of bed most days, can t work or engage in other necessary activities, you have trouble taking care of basic hygiene, or basic responsibilities, thoughts of suicide or death that will not go away, self-injurious behavior.     What you need to do:  1. Call your care team and request  a same-day appointment. If they are not available (weekends or after hours) call your local crisis line, emergency room or 911.            Depression Self Care Plan / Survival Kit    Self-Care for Depression  Here s the deal. Your body and mind are really not as separate as most people think.  What you do and think affects how you feel and how you feel influences what you do and think. This means if you do things that people who feel good do, it will help you feel better.  Sometimes this is all it takes.  There is also a place for medication and therapy depending on how severe your depression is, so be sure to consult with your medical provider and/ or Behavioral Health Consultant if your symptoms are worsening or not improving.     In order to better manage my stress, I will:    Exercise  Get some form of exercise, every day. This will help reduce pain and release endorphins, the  feel good  chemicals in your brain. This is almost as good as taking antidepressants!  This is not the same as joining a gym and then never going! (they count on that by the way ) It can be as simple as just going for a walk or doing some gardening, anything that will get you moving.      Hygiene   Maintain good hygiene (Get out of bed in the morning, Make your bed, Brush your teeth, Take a shower, and Get dressed like you were going to work, even if you are unemployed).  If your clothes don't fit try to get ones that do.    Diet  I will strive to eat foods that are good for me, drink plenty of water, and avoid excessive sugar, caffeine, alcohol, and other mood-altering substances.  Some foods that are helpful in depression are: complex carbohydrates, B vitamins, flaxseed, fish or fish oil, fresh fruits and vegetables.    Psychotherapy  I agree to participate in Individual Therapy (if recommended).    Medication  If prescribed medications, I agree to take them.  Missing doses can result in serious side effects.  I understand that drinking  alcohol, or other illicit drug use, may cause potential side effects.  I will not stop my medication abruptly without first discussing it with my provider.    Staying Connected With Others  I will stay in touch with my friends, family members, and my primary care provider/team.    Use your imagination  Be creative.  We all have a creative side; it doesn t matter if it s oil painting, sand castles, or mud pies! This will also kick up the endorphins.    Witness Beauty  (AKA stop and smell the roses) Take a look outside, even in mid-winter. Notice colors, textures. Watch the squirrels and birds.     Service to others  Be of service to others.  There is always someone else in need.  By helping others we can  get out of ourselves  and remember the really important things.  This also provides opportunities for practicing all the other parts of the program.    Humor  Laugh and be silly!  Adjust your TV habits for less news and crime-drama and more comedy.    Control your stress  Try breathing deep, massage therapy, biofeedback, and meditation. Find time to relax each day.     My support system    Clinic Contact:  Phone number:    Contact 1:  Phone number:    Contact 2:  Phone number:    [facility]/:  Phone number:    Therapist:  Phone number:    Local crisis center:    Phone number:    Other community support:  Phone number:

## 2019-02-04 NOTE — LETTER
My COPD Action Plan     Name: Carmen Perez    YOB: 1950   Date: 2/7/2019    My doctor: Vicky Ye PA-C   My clinic: [place] [address]  [facility] [address] [phone]  My Controller Medicine: Umeclidinium (Incruse Ellipta)  Serevent/Fluticasone (Advair)   Dose: 1 puff daily (incruse)  1 puff twice daily (advair)     My Rescue Medicine: DuoNeb    Dose: 1 vial every 6 hours  Or Combivent 1 puff every 4 hours      My Flare Up Medicine: none    FEV-1 (no units)   Date Value   07/03/2013 0.94     FEV1/FVC (no units)   Date Value   07/03/2013 33      My COPD Severity: Severe = FeV1 < 30%-49%      Use of Oxygen: per pulmonology As Previously Prescribed     Make sure you've had your pneumonia   vaccines.          GREEN ZONE       Doing well today      Usual level of activity and exercise    Usual amount of cough and mucus    No shortness of breath    Usual level of health (thinking clearly, sleeping well, feel like eating) Actions:      Take daily medicines    Use oxygen as prescribed    Follow regular exercise and diet plan    Avoid cigarette smoke and other irritants that harm the lungs           YELLOW ZONE          Having a bad day or flare up      Short of breath more than usual    A lot more sputum (mucus) than usual    Sputum looks yellow, green, tan, brown or bloody    More coughing or wheezing    Fever or chills    Less energy; trouble completing activities    Trouble thinking or focusing    Using quick relief inhaler or nebulizer more often    Poor sleep; symptoms wake me up    Do not feel like eating Actions:      Get plenty of rest    Take daily medicines    Use quick relief inhaler every 4 hours    If you use oxygen, call you doctor to see if you should adjust your oxygen    Do breathing exercises or other things to help you relax    Let a loved one, friend or neighbor know you are feeling worse    Call your care team if you have 2 or more symptoms.  Start  taking steroids or antibiotics if directed by your care team           RED ZONE       Need medical care now      Severe shortness of breath (feel you can't breathe)    Fever, chills    Not enough breath to do any activity    Trouble coughing up mucus, walking or talking    Blood in mucus    Frequent coughing   Rescue medicines are not working    Not able to sleep because of breathing    Feel confused or drowsy    Chest pain    Actions:      Call your health care team.  If you cannot reach your care team, call 911 or go to the emergency room.        Annual Reminders:  Meet with Care Team, Flu Shot every Fall  Pharmacy:    [facility] [place] [address]  [facility]  [facility] [address] [facility] [address] [facility] [address]

## 2019-02-04 NOTE — PATIENT INSTRUCTIONS
Preventive Health Recommendations    See your health care provider every year to    Review health changes.     Discuss preventive care.      Review your medicines if your doctor has prescribed any.      You no longer need a yearly Pap test unless you've had an abnormal Pap test in the past 10 years. If you have vaginal symptoms, such as bleeding or discharge, be sure to talk with your provider about a Pap test.      Every 1 to 2 years, have a mammogram.  If you are over 69, talk with your health care provider about whether or not you want to continue having screening mammograms.      Every 10 years, have a colonoscopy. Or, have a yearly FIT test (stool test). These exams will check for colon cancer.       Have a cholesterol test every 5 years, or more often if your doctor advises it.       Have a diabetes test (fasting glucose) every three years. If you are at risk for diabetes, you should have this test more often.       At age 65, have a bone density scan (DEXA) to check for osteoporosis (brittle bone disease).    Shots:    Get a flu shot each year.    Get a tetanus shot every 10 years.    Talk to your doctor about your pneumonia vaccines. There are now two you should receive - Pneumovax (PPSV 23) and Prevnar (PCV 13).    Talk to your pharmacist about the shingles vaccine.    Talk to your doctor about the hepatitis B vaccine.    Nutrition:     Eat at least 5 servings of fruits and vegetables each day.      Eat whole-grain bread, whole-wheat pasta and brown rice instead of white grains and rice.      Get adequate about Calcium and Vitamin D.     Lifestyle    Exercise at least 150 minutes a week (30 minutes a day, 5 days a week). This will help you control your weight and prevent disease.      Limit alcohol to one drink per day.      No smoking.       Wear sunscreen to prevent skin cancer.       See your dentist twice a year for an exam and cleaning.      See your eye doctor every 1 to 2 years to screen for  conditions such as glaucoma, macular degeneration, cataracts, etc.    Personalized Prevention Plan  You are due for the preventive services outlined below.  Your care team is available to assist you in scheduling these services.  If you have already completed any of these items, please share that information with your care team to update in your medical record.    Health Maintenance Due   Topic Date Due     Heart Failure Action Plan Reviewed Every 3 Years  1950     Medicare Annual Wellness Visit  06/07/1966     Zoster (Shingles) Vaccine (2 of 3) 12/04/2013     COPD ACTION PLAN Q1 YR  06/09/2016     Colon Cancer Screening - FIT Test - yearly  06/12/2016     Discuss Advance Directive Planning  05/04/2017     Osteoporosis Screening (Dexa) - every 2 years   06/23/2017     FALL RISK ASSESSMENT  07/11/2017     Basic Metabolic Lab - every 6 months  04/30/2018     Flu Vaccine (1) 09/01/2018     Mammogram - every 2 years  09/21/2018     Liver Monitoring Lab - yearly  10/02/2018     Complete Blood Count Every Year  10/02/2018     Cholesterol Lab - yearly  10/30/2018

## 2019-02-05 ENCOUNTER — PATIENT OUTREACH (OUTPATIENT)
Dept: CARE COORDINATION | Facility: CLINIC | Age: 69
End: 2019-02-05

## 2019-02-05 DIAGNOSIS — I27.20 PULMONARY HTN (H): ICD-10-CM

## 2019-02-05 DIAGNOSIS — Z93.3 S/P COLOSTOMY (H): ICD-10-CM

## 2019-02-05 DIAGNOSIS — R06.00 DYSPNEA, UNSPECIFIED TYPE: ICD-10-CM

## 2019-02-05 DIAGNOSIS — J43.1 PANLOBULAR EMPHYSEMA (H): ICD-10-CM

## 2019-02-05 DIAGNOSIS — I50.9 CHF (CONGESTIVE HEART FAILURE) (H): ICD-10-CM

## 2019-02-05 DIAGNOSIS — J44.9 COPD (CHRONIC OBSTRUCTIVE PULMONARY DISEASE) (H): Primary | ICD-10-CM

## 2019-02-05 DIAGNOSIS — F32.0 MILD MAJOR DEPRESSION (H): ICD-10-CM

## 2019-02-05 LAB
ALBUMIN SERPL-MCNC: 4.1 G/DL (ref 3.4–5)
ALP SERPL-CCNC: 19 U/L (ref 40–150)
ALT SERPL W P-5'-P-CCNC: 23 U/L (ref 0–50)
ANION GAP SERPL CALCULATED.3IONS-SCNC: 5 MMOL/L (ref 3–14)
AST SERPL W P-5'-P-CCNC: 17 U/L (ref 0–45)
BILIRUB SERPL-MCNC: 0.2 MG/DL (ref 0.2–1.3)
BUN SERPL-MCNC: 11 MG/DL (ref 7–30)
CALCIUM SERPL-MCNC: 10.1 MG/DL (ref 8.5–10.1)
CHLORIDE SERPL-SCNC: 101 MMOL/L (ref 94–109)
CHOLEST SERPL-MCNC: 261 MG/DL
CO2 SERPL-SCNC: 31 MMOL/L (ref 20–32)
CREAT SERPL-MCNC: 0.74 MG/DL (ref 0.52–1.04)
GFR SERPL CREATININE-BSD FRML MDRD: 82 ML/MIN/{1.73_M2}
GLUCOSE SERPL-MCNC: 92 MG/DL (ref 70–99)
HDLC SERPL-MCNC: 81 MG/DL
LDLC SERPL CALC-MCNC: 159 MG/DL
NONHDLC SERPL-MCNC: 180 MG/DL
POTASSIUM SERPL-SCNC: 4.5 MMOL/L (ref 3.4–5.3)
PROT SERPL-MCNC: 7.5 G/DL (ref 6.8–8.8)
SODIUM SERPL-SCNC: 137 MMOL/L (ref 133–144)
TRIGL SERPL-MCNC: 103 MG/DL

## 2019-02-05 ASSESSMENT — ACTIVITIES OF DAILY LIVING (ADL): DEPENDENT_IADLS:: SHOPPING;TRANSPORTATION

## 2019-02-05 ASSESSMENT — ANXIETY QUESTIONNAIRES: GAD7 TOTAL SCORE: 0

## 2019-02-05 NOTE — PROGRESS NOTES
Clinic Care Coordination Contact  OUTREACH    RNCC outreached to patient today.  Introduced her to the role of clinic care coordination services.   RNCC engaged in AIDET communications during encounter.       Referral Information:  Referral Source: PCP    Primary Diagnosis: Psychosocial    Chief Complaint   Patient presents with     Clinic Care Coordination - Initial     Clinic Care        CARE COORDINATION REFERRAL [[id number]]     Electronically signed by: Vicky Ye PA-C on 02/04/19 1720 Status: Completed   Ordering user: Vicky Ye PA-C 02/04/19 1720   Released by: Vicky Ye PA-C 02/04/19 1720   Order History   Outpatient   Date/Time Action Taken User Additional Information   02/04/19 1715 Pend Vicky Ye PA-C    02/04/19 1720 Sign Vicky Ye PA-C    02/04/19 1720 Complete Vicky Ye PA-C    Comments     Services are provided by a Care Coordinator for people with complex needs such as: medical, social, or financial troubles.  The Care Coordinator works with the patient and their Primary Care Provider to determine health goals, obtain resources, achieve outcomes, and develop care plans that help coordinate the patient's care.     Reason for Referral: Care Transition: to see if pt qualifies for home care, Complex Medical Concerns/Education: Chronic diagnosis COPD requires use of oxygen and Financial Support: for anything she suggests    Additional pertinent details:  As above.  Feel free to speak with me regarding patient    Clinical Staff have discussed the Care Coordination Referral with the patient and/or caregiver: yes          Associated Diagnoses     Chronic obstructive pulmonary disease, unspecified COPD type (H) [J44.9]       Panlobular emphysema (H) [J43.1]       Pulmonary HTN - probably secondary to COPD per cardiology [I27.20]                  Universal Utilization:   Clinic Utilization - LOV  "02/04/2019.  Patient was seen by Vicky Ye PA-C at our  Clinic - [place] location to EST CARE as the clinic is close to her home.  RNCC outreached to our [place]  and requested that both the PCP and Primary location be updated in Epic.   Difficulty keeping appointments:: No  Compliance Concerns: No  No-Show Concerns: No  No PCP office visit in Past Year: No  Utilization    Last refreshed: 2/5/2019  7:56 AM:  Hospital Admissions 0           Last refreshed: 2/5/2019  7:56 AM:  ED Visits 0           Last refreshed: 2/5/2019  7:56 AM:  No Show Count (past year) 0              Current as of: 2/5/2019  7:56 AM            Patient Active Problem List   Diagnosis     COPD (chronic obstructive pulmonary disease) (H)     CARDIOVASCULAR SCREENING; LDL GOAL LESS THAN 160     Cor pulmonale (H)     Hyperkalemia     CHF (congestive heart failure) (H)     S/P colostomy (H) - since colonic perforation in 5/2016     Mild major depression (H)     Diverticulitis of large intestine with perforation, unspecified bleeding status - 5/2016 requiring emergency colostomy placement.     History of cervical fracture - C2,C7 while in hospital following emergency surgery for perforated diverticula. Cleared by neurosurgery 8/2016. No chronic issues since.     Former smoker - 40 pack yr history     Panlobular emphysema (H)     Pulmonary HTN - probably secondary to COPD per cardiology     Dyspnea, unspecified type          Clinical Concerns:  Current Medical Concerns:  No new medical concerns identified.   See patient's problem list and 02/04/2019 OV notes for details.   She understands the importance of f/u with Pulmonologist but would like to defer to late spring 2019 when there is no snow/ice.   She is not interested in Pulmonary Rehab, though it has been recommended to her.   \"I just don't see it being possible.\"    She would like RNCC assistance with scheduling Pulmonology appointment with Dr. Prasanna Golden as " "recommended by PCP.  [Prefers last appointment time of the day, so that her son can bring her to it.]    Current Behavioral Concerns: No new behavioral concerns identified.  Patient states that she always tries to \"make the best of any situation.  I am grateful for what I have.\"     Education Provided to patient:   Refer to 'Goals' and 'Plan' sections below.     Pain  Pain (GOAL):: No    Health Maintenance Reviewed: Due/Overdue - Refer to Health Maintenance for details.     Clinical Pathway: None    Medication Management:  Patient self-manages her medication set-up.   She relies on her son to  medications at her preferred pharmacy.   She received a call from them today informing her that all of the medications sent to pharmacy at 02/04/2019 OV are ready for p/u, however she asked that her son hold off on doing so today, given the inclement snowy weather and treacherous road conditions.   Patient is relieved by the fact that NATTY Ye PA-C provided her with refills on her COPD medications until she can be seen by Pulmonologist for annual follow-up.      Functional Status:  Dependent ADLs:: Independent  Dependent IADLs:: Shopping, Transportation, patient's son gets her mail for her as she is not able to ambulate safely to the mailbox.   Bed or wheelchair confined:: No  Mobility Status: Independent  Fallen 2 or more times in the past year?: Screen not completed for medical reason(s)  Any fall with injury in the past year?: Screen not completed for medical reason(s)    Patient is homebound.   She is reliant on her son for all activities outside of her home.   She does not drive any longer.       Living Situation:  Current living arrangement:: I live alone  Type of residence:: [place], [place]. \"It is very comfortable, very nice.\"    She states that her son covers \"almost 100%\" of her rent ($1349.00/mo).   Patient has lived alone for \"a long number of years - since the 1990s.\"   She " "reports that she has faced a lot personally and medically in that time.  She moved to [place] in 2008, from [place].  She has talked with her son an family.  \"If the end game is that the time comes when I have to stop/start/alter my lifestyle, then I would like to move on to the next level of care that I need.  I am okay with that.\"   She doesn't feel she is in that position now, but would like to have resources to prepare for the future and drive those discussions.   RNCC will provide patient with the [facility] Senior Housing & Resource Guide ([id number] version) for her records.     Diet/Exercise/Sleep:  Diet:: Regular  Inadequate nutrition (GOAL):: No  Food Insecurity: No  Tube Feeding: No  Exercise:: Unable to exercise  Inadequate activity/exercise (GOAL):: No  Significant changes in sleep pattern (GOAL): No    Patient's son owns his own restaurant.  He often brings her soups, salads and sandwiches that she can use to supplement her $130.00/month [employer] grocery delivery with.     Transportation:  Transportation concerns (GOAL):: Yes(Patient stopped driving in summer 2018 as she felt she was too dangerous to do so.  Voluntarily gave up driving. )  See 'Goal' below.  Transportation means:: Family, Regular car     Psychosocial:  Sabianism or spiritual beliefs that impact treatment:: No  Mental health DX:: Yes  Mental health DX how managed:: Medication  Mental health management concern (GOAL):: No  Informal Support system:: Child - Only One - Narciso and his wife, kids plus family (in [place] - they call routinely to check in with her.)  She is very proud of her son and all of the things he has accomplished.  \"I am as proud of him as I can be.  I am really grateful for his help.  I just want you to know what a wonderful person he is.  I can trust him immensely.\"   She states that she does not have any friends.       \"The only one I can count on is Narciso (son). He is like my tylor.\"   Narciso is  with two " "teenage daughters in high school.  He owns his own restaurant business in [place] which patient states is very busy and successful.   Narciso is often very busy with his own commitments but does make time to help bring patient meals from his restaurant, p/u medications, grab her mail/drop off, and bring to appointments.   He is very financially supportive to patient as well.   Patient does not want to add ANY services that are not cost-effective at this time as she is on a fixed-income.      Financial/Insurance:  Medicare   Financial/Insurance concerns (GOAL):: Yes  - See 'Goal' below.     Patient is feeling overwhelmed by her finances at this time.  \"I thought I had planned well.  I always worked good jobs.  I just never foresaw all of this.  I didn't ever even see myself retired\" but then health issues happened.   She thinks that she may receive benefits, aside from her social security, through [facility] but does not readily have the name of her worker or what programs she is under at this time.    She receives $1208.00/month in social security benefits which she uses to pay for utilities, groceries, etc.      Resources and Interventions:  Current Resources:  N/A   Community Resources: [facility] Programs(Patient works with [facility] for some covered services and SS benefits.  She does not readily know the name of her worker.  Unsure what program she is currently enrolled in - Elderly Waiver??)  Supplies used at home:: Oxygen Tubing/Supplies  - Supplied through [facility].  Patient has a portable concentrator; utilizes 3 LPM per NC continuously.   Equipment Currently Used at Home: colostomy/ostomy supplies    Advance Care Plan/Directive:  Advanced Care Plans/Directives on file:: No  Advanced Care Plan/Directive Status: Not Applicable    Referrals Placed: Home Care - referral placed/pending Amena (forwarding request onto PCP/Overseeing provider for signature), Senior Linkage Line (brochure plus " "Dailey) and [facility] [employer] Housing & Resource Guide [phone]     Goals        General    1.  Financial Wellbeing (pt-stated)     Notes - Note created  2/5/2019  3:06 PM by Eloise Oh, RN    Goal Statement:  I would like to remain as financially independent as I possibly can for as long as I can with current supports in place.   Measure of Success: Continued financial independence utilizing resources in place.   Supportive Steps to Achieve:  Continued outreach to [facility] entities who are managing patient's services / financial means.  Patient is going to review her records and obtain name of [facility] worker, then provide this information to RNCC at next discussion.   Barriers:  Limited transportation,   Strengths: Positive affect, strong family support (including financial support from son, Narciso)  Date to Achieve By:  March 2019  Patient expressed understanding of goal: Yes           2.  Transportation (pt-stated)     Notes - Note created  2/5/2019  3:08 PM by Eloise Oh, RN    Goal Statement:  I would like to have alternative resources for transportation in place, in the event that my son - Narciso, cannot provide assistance.   Measure of Success: Willingness to utilize family support and alternative options for transportation, as needs arise.   Supportive Steps to Achieve:  RNCC will provide patient with local transportation resources through [facility].   Will consider request for [employer] application from PCP/patient in near future.   Barriers: Lack of transportation   Strengths: Positive affect, son's support with transportation to all medical appointments  Date to Achieve By: March 2019  Patient expressed understanding of goal: Yes              Patient thanked RNCC for today's outreach.   \"I appreciate you so much.  This takes a load off of my shoulders and my mind.\"     Outreach Frequency: weekly    Next 5 appointments (look out 90 days)    Mar 04, 2019 11:20 AM CST  Telephone " [facility] with Vicky [facility]-C  [facility] [address] [place] [address] [phone]          Plan:     Care Coordinator will mail out care coordination introduction letter with care coordinator contact information and explanation of care coordination services. Will include above cited resources.  Patient also requested RNCC include a few extra business cards to keep on hand.      RNCC will outreach to [facility] to assist patient with scheduling annual f/u appointment with Dr. Mims.      RNCC pended [facility] orders.   Forwarding to PCP/Care Team for MD co-sign.       Care Coordinator will try to reach patient again in 3-5 business days.  Will update her on Pulmonology appointment in addition to verification of [facility] services.     ENROLLMENT STATUS:   ENROLLED - Episode and 'Goals' established today.     CARE COORDINATOR STATUS: Care team updated.     Eloise Oh, HEATHER  [facility] Care Coordinator - [place] and [place] Locations   Direct:  [phone] (voicemail available)   (Today's Date: 02/05/2109)

## 2019-02-05 NOTE — LETTER
[facility]  Complex Care Plan  About Me  Patient Name:  Carmen Perez    YOB: 1950  Age:     68 year old   José Miguel MRN:   [id number] Telephone Information:  Home Phone [phone]   Mobile [phone]       Address:    [address] [address]  [place] [address] Email address:  [email or web address]      Emergency Contact(s)  Name Relationship Lgl Grd Work Phone Home Phone Mobile Phone   1. OLY PALACIOS* Son  none [phone]   2. BERYL PALACIOS Daughter  none [phone]           Primary language:  English     needed? No   [facility]:  [phone] op. 1  Other communication barriers: Physical impairment  Preferred Method of Communication:  Audra  Current living arrangement: I live alone  Mobility Status/ Medical Equipment: Independent    Health Maintenance  Health Maintenance Reviewed: Due/Overdue     My Access Plan  Medical Emergency 911   Primary Clinic Line [facility] - [phone]   24 Hour Appointment Line [phone] or  [phone]) (toll-free)   24 Hour Nurse Line [phone] (toll-free)   Preferred Urgent Care [facility], [phone]   Preferred Hospital [facility]  [phone]   Preferred Pharmacy [facility] - [facility] [place] - [address]     Behavioral Health Crisis Line The National Suicide Prevention Lifeline at 1-645.780.1829 or 911     My Care Team Members    Patient Care Team       Relationship Specialty Notifications Start End    Vicky Ye PA-C PCP - General Physician Assistant - Medical  2/5/19     Phone: [phone] Fax: [phone] [address] [place] [address]                       [address] [place] [address]    Masters, Eloise LAY, RN Lead Care Coordinator Primary Care - CC Admissions 2/5/19     Phone: [phone] Fax: [phone]                My Care  Plans  Self Management and Treatment Plan  Goals and (Comments)  Goals        General    1.  Financial Wellbeing (pt-stated)     Notes - Note created  2/5/2019  3:06 PM by Eloise Oh, RN    Goal Statement:  I would like to remain as financially independent as I possibly can for as long as I can with current supports in place.   Measure of Success: Continued financial independence utilizing resources in place.   Supportive Steps to Achieve:  Continued outreach to [facility] entities who are managing patient's services / financial means.  Patient is going to review her records and obtain name of [facility] worker, then provide this information to RNCC at next discussion.   Barriers:  Limited transportation,   Strengths: Positive affect, strong family support (including financial support from son, Narciso)  Date to Achieve By:  March 2019  Patient expressed understanding of goal: Yes           2.  Transportation (pt-stated)     Notes - Note created  2/5/2019  3:08 PM by Eloise Oh, RN    Goal Statement:  I would like to have alternative resources for transportation in place, in the event that my son - Narciso, cannot provide assistance.   Measure of Success: Willingness to utilize family support and alternative options for transportation, as needs arise.   Supportive Steps to Achieve:  RNCC will provide patient with local transportation resources through [facility].   Will consider request for [employer] application from PCP/patient in near future.   Barriers: Lack of transportation   Strengths: Positive affect, son's support with transportation to all medical appointments  Date to Achieve By: March 2019  Patient expressed understanding of goal: Yes                 Action Plans on File: N/A                      Advance Care Plans/Directives Type:  N/A       My Medical and Care Information  Problem List   Patient Active Problem List   Diagnosis     COPD (chronic obstructive pulmonary disease) (H)      CARDIOVASCULAR SCREENING; LDL GOAL LESS THAN 160     Cor pulmonale (H)     Hyperkalemia     CHF (congestive heart failure) (H)     S/P colostomy (H) - since colonic perforation in 5/2016     Mild major depression (H)     Diverticulitis of large intestine with perforation, unspecified bleeding status - 5/2016 requiring emergency colostomy placement.     History of cervical fracture - C2,C7 while in hospital following emergency surgery for perforated diverticula. Cleared by neurosurgery 8/2016. No chronic issues since.     Former smoker - 40 pack yr history     Panlobular emphysema (H)     Pulmonary HTN - probably secondary to COPD per cardiology     Dyspnea, unspecified type      Current Medications and Allergies:  See printed Medication Report.    Care Coordination Start Date: 2/5/2019   Frequency of Care Coordination: weekly   Form Last Updated: 02/05/2019

## 2019-02-05 NOTE — LETTER
[place] CARE COORDINATION      February 5, 2019    Carmen Perez  [address] [id number]  [facility] [place] [address]      Dear Carmen,    I am a clinic care coordinator who works with Vicky Ye PA-C at [facility]. I wanted to thank you for spending the time to talk with me.  I wanted to introduce myself and provide you with my contact information so that you can call me with questions or concerns about your health care. Below is a description of clinic care coordination and how I can further assist you.     The clinic care coordinator is a registered nurse and/or  who understand the health care system. The goal of [facility] care coordination is to help you manage your health and improve access to the [facility] in the most efficient manner. The registered nurse can assist you in meeting your health care goals by providing education, coordinating services, and strengthening the communication among your providers. The  can assist you with financial, behavioral, psychosocial, chemical dependency, counseling, and/or psychiatric resources.    Please feel free to contact me at [phone], with any questions or concerns. We at [facility] are focused on providing you with the highest-quality healthcare experience possible and that all starts with you.     Sincerely,     Eloise Ozunas    Enclosed: I have enclosed a copy of the Complex Care Plan. This has helpful information and goals that we have talked about. Please keep this in an easy to access place to use as needed. I have also enclosed helpful educational material/resources as we discussed. Please review and call me with any questions.

## 2019-02-06 ASSESSMENT — ACTIVITIES OF DAILY LIVING (ADL): DEPENDENT_IADLS:: SHOPPING;TRANSPORTATION

## 2019-02-06 NOTE — PROGRESS NOTES
Clinic Care Coordination Contact  Care Team Conversations    1533 hours - RNCC outreached to [facility] ([phone]), spoke with Noemy - .       RNCC secured the following appointment for patient:    Friday 04/26/2019 @ 415pm    Dr. Mims    [facility] [place] location  *Patient to bring ID and new insurance card to this appointment.   Noemy also requested that patient call their office with her new insurance information prior to appointment as well.     Advised Noemy that PCP has patient's rx's covered through next Pulmonology appt date.   RNCC thanked Noemy for her help today.     1540 hours - RNCC notified patient of the aforementioned appointment.   She thanked RNCC for the assistance with scheduling on her behalf.     Vicky - Please see below entry/request for [employer] and advise.   If you agree to this, RNCC will assist patient with application completion.   Thank you!      Eloise Oh, BSN, RN   [facility]  Clinic Care Coordinator - [place] [facility] Locations   Direct:  [phone] (voicemail available)   (Today's Date: 02/06/2019)

## 2019-02-06 NOTE — PROGRESS NOTES
"Clinic Care Coordination Contact    RNCC engaged in [employer] communications during encounter.     RNCC performed chart review.  Order for [facility] services signed by Dr. Yoav Muller on 02/05/2019.      Tuesday 02/05/2019 - 1448 hours - Patient called RNCC back and reports that she has the information (for county worker) that we discussed.   She would like a call back to review together.     Wednesday 02/06/2019 - 0947 hours - RNCC outreached to patient.  She provided writer with the following information:    She has been working with [facility] [employer] [case #[id number]]; county worker:  Levon FIGUEROA ([phone]).   She was recently processed for eligibility renewal benefits ([id number]).   This allows her Medicare premiums to be paid, under her Service-Limited Beneficiary Benefits.  [This covers the cost of Medicare - Part B, on her behalf].  It also allows her to have coverage through [facility] Application for all approved services (in-patient and ambulatory services) at 100% coverage.       Historically, she carried Medicare- Part B through [employer] [Member ID# [id number]].   Evidence of Coverage Ronen (a low income subsidy) helped to extensively cover the cost of rx medications through Medicare Rx Saver program.     Patient had colostomy surgery in May 2016.  She is not interested in the 'take-down' procedure.   (This decision is cited in 10/02/2017 OV notes by NATTY Rodríguez PA-C).   She utilizes ostomy bags will plans to for the remainder of her lifetime.  [Refer to 09/21/2016 Orders - [employer] forms in 'Media' tab.]  She has been ordering through [employer] for the past few years.  [[employer] - customer ID# , [phone].]  She uses ostomy bag:  [employer] (#[id number]).  Her last order was placed on 12/12/2018; two boxes (each box contains 10 bags).   The patient typically uses 1-2 bags/per week, depending upon if there is an \"accident\" with leakage.  She uses necessary ostomy supplies including " barrier film and powder, prn.   Patient typically pays $42 for two boxes of ostomy bags.     Patient is interested in a [employer] application, if provider feels it is appropriate.  She has a handicap parking pass, but no longer drives and only utilizes when she is receiving transportation from her son.     Plan:      Patient is hoping that RNCC can assist her with cost-reduction on these supplies.   RNCC advised patient that this issue will have to be investigated further; once more information is available RNCC will notify her.     Care Coordinator will outreach to patient in one week for routine follow-up.    ENROLLMENT STATUS:   ENROLLED - episode current.  Refer to 'Goals' section.     CARE COORDINATOR STATUS: Care team updated today to reflect some of the above noted information.     Sending to PCP for review/FYI.     Vicky - Would you be willing to complete a [employer] application on patient's behalf, if she qualifies?    Eloise Oh, RN  [facility] Care Coordinator - [place] [facility] Locations   Direct:  [phone] (voicemail available)   (Today's Date: 02/06/2019)

## 2019-02-07 ENCOUNTER — DOCUMENTATION ONLY (OUTPATIENT)
Dept: CARE COORDINATION | Facility: CLINIC | Age: 69
End: 2019-02-07

## 2019-02-07 PROBLEM — R60.0 BILATERAL LOWER EXTREMITY EDEMA: Status: ACTIVE | Noted: 2019-02-07

## 2019-02-07 NOTE — PROGRESS NOTES
Hernando Moreland,  I am in agreement with that. Unfortunately, as a PASOO we cannot sign home care orders though. My colleague, Dr. Yoav Muller, had signed the previous orders in absence of my supervising physician, Dr. Nabor Mendez, as he is out of the office this week. If you could please send future orders to either of them, that would be great.    Thank you,  Electronically Signed By: Vicky Ye PA-C

## 2019-02-07 NOTE — PROGRESS NOTES
To Vicky, thank you so much for the referral for this delightful lady.  This note is here to request an updated order date for the homecare admission visit, to next week 2\13\19.  This can be considered a verbal order, when you reply to this message, and the printed order will come in the form of the homecare certification document.  The patient does not want anyone to visit her, says she is fine until next week,  she is concerned about people driving in this bad weather.  She has chosen Wednesday.  Thank you for your coordination of care for this patient.  Merly Arrington RN, BSN  Clinical Coordinator, [facility]

## 2019-02-08 ENCOUNTER — TELEPHONE (OUTPATIENT)
Dept: FAMILY MEDICINE | Facility: CLINIC | Age: 69
End: 2019-02-08

## 2019-02-08 ASSESSMENT — ACTIVITIES OF DAILY LIVING (ADL): DEPENDENT_IADLS:: SHOPPING;TRANSPORTATION

## 2019-02-08 NOTE — TELEPHONE ENCOUNTER
Routing to provider for review and advise as appropriate.    KIM KempN, RN  [employer] Triage

## 2019-02-08 NOTE — PROGRESS NOTES
Clinic Care Coordination Contact  Care Team Conversations    RNCC spoke with PCP.  Case reviewed. She provided RNCC with verbal order to assist patient with [employer] application.   She is willing to complete her portion of the application.   RNCC thanked her for her collaboration with this patient.     Please refer to 02/07/2019 Documentation Only - Home Care/Hospice entry.   Patient deferred [facility] SOC to 02/13/2019.      Plan:     Care Coordinator will outreach to patient in apprx. One week for folow-up.     RNCC will verify [facility] SOC and RN CM assigment within the next week as well, after 02/13/2019 tentative SOC date.     ENROLLMENT STATUS:   ENROLLED    CARE COORDINATOR STATUS: Care team current.     Eloise Oh, BSN, RN   [facility]  Clinic Care Coordinator - [place] [facility] Locations   Direct:  [phone] (voicemail available)   (Today's Date: 02/08/2019)

## 2019-02-08 NOTE — TELEPHONE ENCOUNTER
Clinic Action Needed: Yes, please call pharmacist Juli back at [facility]    Reason for Call: [facility] calling on [place] clinic line and FNA received call. Juli, pharmacist, would like to verify the provider would like to prescribe both Combivent and Incruse as they are from the same med class family. Patient is not waiting at pharmacy and FNA advised that will send message to care team to address. FNA advised to call clinic back with further questions or concerns. Caller verbalized understanding of directives.     Patient Recommendations/Teaching: Referred to clinic for follow up.     Routed to: Vicky Ye PA-C/ RN Claudio Granado RN  [facility]

## 2019-02-08 NOTE — TELEPHONE ENCOUNTER
Spoke to pharmacist Juli and per pulmonologist pt qualifies for triple bronchodilator therapy based on severity of her COPD. She will proceed with prescriptions as planned.  Electronically Signed By: Vicky Ye PA-C

## 2019-02-10 DIAGNOSIS — E66.09 CLASS 1 OBESITY DUE TO EXCESS CALORIES WITH SERIOUS COMORBIDITY AND BODY MASS INDEX (BMI) OF 30.0 TO 30.9 IN ADULT: ICD-10-CM

## 2019-02-10 DIAGNOSIS — R74.8 LOW SERUM ALKALINE PHOSPHATASE: ICD-10-CM

## 2019-02-10 DIAGNOSIS — D64.9 ANEMIA, UNSPECIFIED TYPE: Primary | ICD-10-CM

## 2019-02-10 DIAGNOSIS — E66.811 CLASS 1 OBESITY DUE TO EXCESS CALORIES WITH SERIOUS COMORBIDITY AND BODY MASS INDEX (BMI) OF 30.0 TO 30.9 IN ADULT: ICD-10-CM

## 2019-02-10 NOTE — RESULT ENCOUNTER NOTE
Sergio Morales,      Your recent test results are noted below:    -Hemoglobin is slightly decreased indicating a mild anemia. We should repeat this test in 1 months for stability and check additional labs given your ALP (alkaline phosphatase) level is also persistently low. This can happen when people are deficient in vitamins like B12, folate or if they have underlying thyroid disease. Return sooner should you notice black, tarry stools or persistent dizziness as it could be symptoms of occult blood loss and we should check it sooner.   -White blood cell and platelet counts are normal.  -LDL(bad) cholesterol level is elevated which can increase your heart disease risk.  A diet high in fat and simple carbohydrates, genetics and being overweight can contribute to this. ADVISE: exercising 150 minutes of aerobic exercise per week (30 minutes for 5 days per week or 50 minutes for 3 days per week are options) and eating a low saturated fat/low carbohydrate diet are helpful to improve this. Current guidelines recommend initiation a medication called a statin to if your 10-year heart disease risk assessment increase to 7.5%. At this point, your score is only 6.5% as noted below. We can continue to monitor this.    The 10-year ASCVD risk score (Wilmerarun PLASCENCIA Jr., et al., 2013) is: 6.5%    Values used to calculate the score:      Age: 68 years      Sex: Female      Is Non- : No      Diabetic: No      Tobacco smoker: No      Systolic Blood Pressure: 104 mmHg      Is BP treated: No      HDL Cholesterol: 81 mg/dL      Total Cholesterol: 261 mg/dL    -Liver and gallbladder tests are normal (ALT,AST, Alk phos, bilirubin) excluding ALP is low as noted above.  - Kidney function is normal (Cr, GFR), sodium is normal, potassium is normal, calcium is normal, glucose is normal.    For additional lab test information, labtestsonline.org is an excellent reference. Please contact the clinic at [phone] with any further  questions or concerns.    Sincerely,      Vicky Ye PA-C  [facility]

## 2019-02-14 ENCOUNTER — PATIENT OUTREACH (OUTPATIENT)
Dept: CARE COORDINATION | Facility: CLINIC | Age: 69
End: 2019-02-14

## 2019-02-14 DIAGNOSIS — I27.20 PULMONARY HYPERTENSION (H): Primary | ICD-10-CM

## 2019-02-14 DIAGNOSIS — J44.9 COPD, SEVERE (H): ICD-10-CM

## 2019-02-14 DIAGNOSIS — I50.30 (HFPEF) HEART FAILURE WITH PRESERVED EJECTION FRACTION (H): ICD-10-CM

## 2019-02-14 ASSESSMENT — ACTIVITIES OF DAILY LIVING (ADL): DEPENDENT_IADLS:: SHOPPING;TRANSPORTATION

## 2019-02-14 NOTE — PROGRESS NOTES
Clinic Care Coordination Contact  Care Coordination Communication    Clinical Data:  Refer to 02/05/2019 Patient Outreach - Clinic Care Coordinator (RNCC).      RNCC engaged in AID[employer] communications during encounter.     Home Care Contact:              Home Care Agency: [facility]               Contact name () and phone number:                Care Coordination contacted home care: Yes - spoke with uriel Osborn.              Anticipated Start of care Date:   Irena reports that patient was supposed to have had initial intake assessment by [facility] RN on 02/13/2019 but unclear if she was actually opened to services.   She will relay message onto Merly Arrington, Clinical Coordinator with [facility] and request her to call writer with updates.        Plan:     Awaiting return call from Merly Arrington Clinical Coordinator with [facility].      ENROLLMENT STATUS:   ENROLLED    CARE COORDINATOR STATUS: Care team current    Eloise Oh, BSN, RN   [facility]  Clinic Care Coordinator - [place] [facility] Locations   Direct:  [phone] (voicemail available)   (Today's Date: 02/14/2019)

## 2019-02-18 ASSESSMENT — ACTIVITIES OF DAILY LIVING (ADL): DEPENDENT_IADLS:: SHOPPING;TRANSPORTATION

## 2019-02-18 NOTE — PROGRESS NOTES
Clinic Care Coordination Contact  Care Coordination Communication    RNCC engaged in AID[employer] communications during encounter.     Referral Source: PCP    Home Care Contact:              Home Care Agency:  [facility] [phone]              Care Coordination contacted home care: Yes - initially spoke with bertin Rivers.   She reports that the patient was never opened to [facility] services as patient doesn't meet admission criteria (as 02/16/2019).  Transferred to [facility], [facility].   Case reviewed.    RN went to patient's home on 02/16/2019.  Apparently, patient DECLINED home care services on 02/16/2019 during her initial intake visit.       PLAN:    RNCC will outreach to patient in the next 3-5 business days for further discussion and determination of ongoing clinic care coordination needs.    ENROLLMENT STATUS:   ENROLLED.  Episode current, refer to 'Goals'.     CARE COORDINATOR STATUS: Care team updated.     Eloise Oh, BSN, RN   [facility]  Clinic Care Coordinator - [facility] Locations   Direct:  [phone] (voicemail available)   (Today's Date: 02/18/2019)

## 2019-03-01 ENCOUNTER — PATIENT OUTREACH (OUTPATIENT)
Dept: CARE COORDINATION | Facility: CLINIC | Age: 69
End: 2019-03-01

## 2019-03-01 NOTE — PROGRESS NOTES
Clinic Care Coordination Contact    Situation: Patient chart reviewed by care coordinator.    Background:  Please refer to 02/05/2019 and 02/14/2019 Patient Outreach - Clinic Care Coordinator (RNCC) entries.      Assessment:  Patient has an upcoming appointment (telephonic) on Monday 03/04/2019 with NATTY Ye PA-C.      Plan:     Care Coordinator will outreach to patient in 3-5 days, after her upcoming appointment with PCP.    ENROLLMENT STATUS:   ENROLLED; episode current.  Refer to 'Goals'.     CARE COORDINATOR STATUS: Care team current.     Eloise Ozunas, BSN, RN   [facility]  Clinic Care Coordinator - [facility] Locations   Direct:  [phone] (voicemail available)   (Today's Date: 03/01/2019)

## 2019-03-04 ENCOUNTER — TELEPHONE (OUTPATIENT)
Dept: FAMILY MEDICINE | Facility: CLINIC | Age: 69
End: 2019-03-04

## 2019-03-04 NOTE — TELEPHONE ENCOUNTER
Date Forms was received: March 4, 2019    Forms received by: Fax    Purpose of Form:  Nursing Home Orders [facility]    When the form is due:  ASAP    How the form needs to be returned for patient:  Fax    Form currently placed  SW inbox

## 2019-03-04 NOTE — TELEPHONE ENCOUNTER
Update from patient regarding her medications. She declined her telephone visit because it is not a covered visit by [place] She states that she is doing wonderfully on her medication and feeling great.  She does have a pulmonology appointment scheduled for April 23rd as well.    Clarita Cevallos MA

## 2019-03-05 NOTE — TELEPHONE ENCOUNTER
Reviewed, will close enc. OK to refill then in future given she is doing well.  Electronically Signed By: Vicky Ye PA-C

## 2019-03-29 ENCOUNTER — PATIENT OUTREACH (OUTPATIENT)
Dept: CARE COORDINATION | Facility: CLINIC | Age: 69
End: 2019-03-29

## 2019-03-29 NOTE — LETTER
[place] CARE COORDINATION      March 29, 2019    Carmen Perez  [address] [address]  [place] [address]      Dear Carmen,    I am a clinic care coordinator who works with Vicky Ye PA-C at [facility].  Please note, I will no longer be in the clinic care coordination role after 04/17/2019.  Please feel free to contact me before that time with any questions or concerns; [phone].    The clinic care coordinator is a registered nurse and/or  who understand the health care system. The goal of clinic care coordination is to help you manage your health and improve access to the [facility] in the most efficient manner. The registered nurse can assist you in meeting your health care goals by providing education, coordinating services, and strengthening the communication among your providers. The  can assist you with financial, behavioral, psychosocial, chemical dependency, counseling, and/or psychiatric resources.     We at [facility] are focused on providing you with the highest-quality healthcare experience possible and that all starts with you.     Sincerely,     Eloise Ozunas

## 2019-03-29 NOTE — PROGRESS NOTES
Clinic Care Coordination Contact    Situation: Patient chart reviewed by care coordinator.    Background:      Please refer to 03/01/2019, 02/14/2019 and 02/05/2019 Patient Outreach - Clinic Care Coordination entries.     See 03/04/2019 telephone encounter as well.     Assessment:  No current clinic care coordination needs identified at this time.      Plan/Recommendations:     Care Coordinator mailed out care coordination introduction letter on 02/05/2019.       Closing to clinic care coordination services.     Care Coordinator will do no further outreaches at this time.    Please note, writer will no longer be the RNCC for this office effective 04/17/2019.   RNCC will mail updated letter to patient reflecting this information. Should patient have any future needs, can work with assigned clinic care coordination staff.      ENROLLMENT STATUS: Graduated     CARE COORDINATOR STATUS: Care team updated    Eloise Oh, BSN, RN, PHN   [facility] Care Coordinator - [place] [facility] Locations   Direct:  [phone] (voicemail available)   (Today's Date: 03/29/2019)

## 2019-04-21 ENCOUNTER — APPOINTMENT (OUTPATIENT)
Dept: CT IMAGING | Facility: CLINIC | Age: 69
End: 2019-04-21
Attending: EMERGENCY MEDICINE
Payer: MEDICARE

## 2019-04-21 ENCOUNTER — HOSPITAL ENCOUNTER (EMERGENCY)
Facility: CLINIC | Age: 69
Discharge: HOME | End: 2019-04-21
Attending: EMERGENCY MEDICINE | Admitting: EMERGENCY MEDICINE
Payer: MEDICARE

## 2019-04-21 VITALS
DIASTOLIC BLOOD PRESSURE: 72 MMHG | HEART RATE: 72 BPM | SYSTOLIC BLOOD PRESSURE: 134 MMHG | OXYGEN SATURATION: 99 % | TEMPERATURE: 98.3 F | RESPIRATION RATE: 20 BRPM

## 2019-04-21 DIAGNOSIS — S09.90XA CLOSED HEAD INJURY, INITIAL ENCOUNTER: ICD-10-CM

## 2019-04-21 DIAGNOSIS — S01.81XA LACERATION OF FOREHEAD, INITIAL ENCOUNTER: ICD-10-CM
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
## 2019-04-21 LAB
ANION GAP SERPL CALCULATED.3IONS-SCNC: 4 MMOL/L (ref 3–14)
BASOPHILS # BLD AUTO: 0 10E9/L (ref 0–0.2)
BASOPHILS NFR BLD AUTO: 0.4 %
BUN SERPL-MCNC: 16 MG/DL (ref 7–30)
CALCIUM SERPL-MCNC: 9.7 MG/DL (ref 8.5–10.1)
CHLORIDE SERPL-SCNC: 106 MMOL/L (ref 94–109)
CO2 SERPL-SCNC: 32 MMOL/L (ref 20–32)
CREAT SERPL-MCNC: 0.69 MG/DL (ref 0.52–1.04)
DIFFERENTIAL METHOD BLD: ABNORMAL
EOSINOPHIL # BLD AUTO: 0.2 10E9/L (ref 0–0.7)
EOSINOPHIL NFR BLD AUTO: 1.8 %
ERYTHROCYTE [DISTWIDTH] IN BLOOD BY AUTOMATED COUNT: 13.2 % (ref 10–15)
GFR SERPL CREATININE-BSD FRML MDRD: 88 ML/MIN/{1.73_M2}
GLUCOSE SERPL-MCNC: 104 MG/DL (ref 70–99)
HCT VFR BLD AUTO: 36.9 % (ref 35–47)
HGB BLD-MCNC: 10.8 G/DL (ref 11.7–15.7)
IMM GRANULOCYTES # BLD: 0 10E9/L (ref 0–0.4)
IMM GRANULOCYTES NFR BLD: 0.3 %
LYMPHOCYTES # BLD AUTO: 0.7 10E9/L (ref 0.8–5.3)
LYMPHOCYTES NFR BLD AUTO: 6.9 %
MCH RBC QN AUTO: 26.1 PG (ref 26.5–33)
MCHC RBC AUTO-ENTMCNC: 29.3 G/DL (ref 31.5–36.5)
MCV RBC AUTO: 89 FL (ref 78–100)
MONOCYTES # BLD AUTO: 0.7 10E9/L (ref 0–1.3)
MONOCYTES NFR BLD AUTO: 6.7 %
NEUTROPHILS # BLD AUTO: 8.1 10E9/L (ref 1.6–8.3)
NEUTROPHILS NFR BLD AUTO: 83.9 %
NRBC # BLD AUTO: 0 10*3/UL
NRBC BLD AUTO-RTO: 0 /100
PLATELET # BLD AUTO: 295 10E9/L (ref 150–450)
POTASSIUM SERPL-SCNC: 4.4 MMOL/L (ref 3.4–5.3)
RBC # BLD AUTO: 4.14 10E12/L (ref 3.8–5.2)
SODIUM SERPL-SCNC: 142 MMOL/L (ref 133–144)
WBC # BLD AUTO: 9.7 10E9/L (ref 4–11)

## 2019-04-21 PROCEDURE — 80048 BASIC METABOLIC PNL TOTAL CA: CPT | Performed by: EMERGENCY MEDICINE

## 2019-04-21 PROCEDURE — 99284 EMERGENCY DEPT VISIT MOD MDM: CPT | Mod: 25

## 2019-04-21 PROCEDURE — 85025 COMPLETE CBC W/AUTO DIFF WBC: CPT | Performed by: EMERGENCY MEDICINE

## 2019-04-21 PROCEDURE — 70450 CT HEAD/BRAIN W/O DYE: CPT

## 2019-04-21 PROCEDURE — 25000128 H RX IP 250 OP 636: Performed by: EMERGENCY MEDICINE

## 2019-04-21 PROCEDURE — 12011 RPR F/E/E/N/L/M 2.5 CM/<: CPT

## 2019-04-21 PROCEDURE — 96360 HYDRATION IV INFUSION INIT: CPT

## 2019-04-21 RX ORDER — LIDOCAINE HYDROCHLORIDE AND EPINEPHRINE 10; 10 MG/ML; UG/ML
INJECTION, SOLUTION INFILTRATION; PERINEURAL
Status: DISCONTINUED
Start: 2019-04-21 | End: 2019-04-21 | Stop reason: HOSPADM

## 2019-04-21 RX ORDER — LIDOCAINE 40 MG/G
CREAM TOPICAL
Status: DISCONTINUED | OUTPATIENT
Start: 2019-04-21 | End: 2019-04-21 | Stop reason: HOSPADM

## 2019-04-21 RX ADMIN — SODIUM CHLORIDE 500 ML: 9 INJECTION, SOLUTION INTRAVENOUS at 11:56

## 2019-04-21 ASSESSMENT — ENCOUNTER SYMPTOMS
COUGH: 0
VOMITING: 0
DIARRHEA: 0
APPETITE CHANGE: 0
WOUND: 1
BLOOD IN STOOL: 0

## 2019-04-21 NOTE — ED AVS SNAPSHOT
[facility] Emergency Department  [address]  [facility] [address]  Phone:  [phone]  Fax:  [phone]                                    Carmen Perez   MRN: [id number]    Department:  [facility] Emergency Department   Date of Visit:  4/21/2019           After Visit Summary Signature Page    I have received my discharge instructions, and my questions have been answered. I have discussed any challenges I see with this plan with the nurse or doctor.    ..........................................................................................................................................  Patient/Patient Representative Signature      ..........................................................................................................................................  Patient Representative Print Name and Relationship to Patient    ..................................................               ................................................  Date                                   Time    ..........................................................................................................................................  Reviewed by Signature/Title    ...................................................              ..............................................  Date                                               Time          22EPIC Rev 08/18

## 2019-04-21 NOTE — ED TRIAGE NOTES
Arrives with forehead laceration after falling face first this morning. States she does not know why she fell, denies LOC, alert and oriented, ABCs intact.

## 2019-04-21 NOTE — ED PROVIDER NOTES
History     Chief Complaint:  Fall    HPI   Carmen Perez is a 68 year old female with severe COPD and a history of MI who presents to the emergency department today for evaluation after a fall. This morning the patient was getting ready to go over to her son's house for Easter. She was feeling fine, albeit somewhat anxious secondary to the increased activity with Easter. She was walking over to her oxygen tank and fell, hitting her head and knees. She does not remember anything from the fall other than the sound of her head hitting, and she has a laceration to her forehead. She is unsure if she lost consciousness. She denies coughing up blood, fever, chest pain, acute changes in her breathing, and denies diarrhea, vomiting, and changes to her intake to suggest dehydration.     Allergies:  No Known Drug Allergies    Medications:    ACE/ARB/ARNI NOT PRESCRIBED (INTENTIONAL)  acetaminophen (TYLENOL) 325 MG tablet  calcium carb-cholecalciferol 600-500 MG-UNIT CAPS  fluticasone-salmeterol (ADVAIR DISKUS) 250-50 MCG/DOSE inhaler  furosemide (LASIX) 20 MG tablet  Ipratropium-Albuterol (COMBIVENT RESPIMAT)  MCG/ACT inhaler  umeclidinium (INCRUSE ELLIPTA) 62.5 MCG/INH inhaler  venlafaxine (EFFEXOR-XR) 37.5 MG 24 hr capsule    Past Medical History:    Asthma   COPD   Cor pulmonale   Hyperkalemia   Mild major depression   Myocardial infarction      Past Surgical History:    History reviewed. No pertinent surgical history.    Family History:    CAD Mother    Social History:  The patient was accompanied to the ED by her son.  Smoking Status: Former Smoker  Smokeless Tobacco: Never Used  Alcohol Use: Negative    Marital Status:  Single      Review of Systems   Constitutional: Negative for appetite change.   Respiratory: Negative for cough.    Cardiovascular: Negative for chest pain.   Gastrointestinal: Negative for blood in stool, diarrhea and vomiting.   Skin: Positive for wound.   All other systems reviewed and are  negative.    Physical Exam     Patient Vitals for the past 24 hrs:   BP Temp Temp src Pulse Resp SpO2   04/21/19 1300 -- -- -- -- -- 99 %   04/21/19 1245 -- -- -- -- -- 100 %   04/21/19 1230 -- -- -- -- -- 98 %   04/21/19 1215 -- -- -- -- -- 98 %   04/21/19 1201 134/72 -- -- 72 20 98 %   04/21/19 1200 134/72 -- -- 71 -- 98 %   04/21/19 1130 -- -- -- -- -- 90 %   04/21/19 1026 (!) 160/91 98.3  F (36.8  C) Temporal 102 22 98 %      Physical Exam  General: Elderly female sitting upright  Eyes: PERRL, Conjunctive within normal limits. EOMI.  HENT: No palpable hematoma. Nontender. Moist mucous membranes, oropharynx clear.   Neck: nontender. Normal AROM.  CV: Normal S1S2, no murmur, rub or gallop. Regular rate and rhythm  Resp: Clear to auscultation bilaterally. Normal respiratory effort.  GI: Abdomen is soft, nontender and nondistended. No palpable masses. No rebound or guarding.  MSK: No edema. Nontender, specifically no significant knee tenderness to palpation bilaterally. Normal active range of motion.  Skin: Warm and dry. 2cm subcutaneous forehead laceration with slow oozing of red blood. No visible skull or galea. No rashes or lesions or ecchymoses on visible skin.  Neuro: Alert and oriented. Responds appropriately to all questions and commands. No focal findings appreciated. Normal muscle tone.  Psych: Normal mood and affect. Pleasant.      Emergency Department Course     Imaging:  Radiology findings were communicated with the patient and her son who voiced understanding of the findings.    CT Head w/o Contrast  1.  No acute abnormality.  2.  Mild presumed chronic small vessel ischemic change, slightly  progressed compared to the prior study.  Reading per radiology    Laboratory:  Laboratory findings were communicated with the patient and her son who voiced understanding of the findings.    CBC: WBC 9.7, HGB 10.8,   BMP: Glucose 104, o/w WNL (Creatinine 0.69)    Procedures:  [facility] Procedure  Note        Laceration Repair:    Performed by: Sindi Thomas MD   Authorized by: Sindi Thomas MD   Consent given by: Patient who states understanding of the procedure being performed after discussing the risks, benefits and alternatives.    Preparation: Patient was prepped and draped in usual sterile fashion.  Irrigation solution: saline    Body area:scalp  Laceration length: 2cm  Contamination: The wound is not contaminated.  Foreign bodies:none  Anesthesia: Local  Local anesthetic: Lidocaine 1%, with epinephrine  Anesthetic total: 3ml    Debridement: none  Skin closure: Closed with 3 x 5.0 Ethilon  Technique: interrupted  Approximation: close  Approximation difficulty: simple    Patient tolerance: Patient tolerated the procedure well with no immediate complications.     Interventions:  1156 NS, 500 mL, IV     Emergency Department Course:    1035 Nursing notes and vitals reviewed.    1038 I performed an exam of the patient as documented above.     1133 The patient was sent for a CT while in the emergency department, results above.      1152 IV was inserted and blood was drawn for laboratory testing, results above.     1320 I personally reviewed the imaging and lab results with the patient and answered all related questions prior to discharge.    Impression & Plan      Medical Decision Making:  Carmen Perez is a 68 year old female, O2 dependent due to COPD, who presents to the emergency department today for evaluation after a fall at home. Etiology unclear. She is unsure if she lost consciousness when she fell but she did hit her head . She has a 2 cm laceration to her upper forehead/scalp. CT scan fortunately negative for intracranial bleed and fracture. Laceration closed as above. Tetanus UTD and there is no indication for antibiotics. She was a little orthostatic with standing but is asymptomatic. She was given IV fluids and ambulated at baseline. At this time there does not appear to be any  acute, life threatening cause of her fall and is not associated with any symptoms. No evidence of dysrhythmia on ECG or by telemetry here. I fell comfortable discharging her home with close follow up with her PCP as well as follow up in 1 week for suture removal. Return to the Emergency Department with new or worsening symptoms. All questions answered prior to discharg.    Diagnosis:    ICD-10-CM    1. Closed head injury, initial encounter S09.90XA    2. Laceration of forehead, initial encounter S01.81XA    3. Fall, initial encounter W19.XXXA      Disposition:   Discharge    Scribe Disclosure:  IKeon, am serving as a scribe at 10:42 AM on 4/21/2019 to document services personally performed by Sindi Thomas MD based on my observations and the provider's statements to me.    [facility] EMERGENCY DEPARTMENT       Sindi Thomas MD  04/23/19 2282

## 2019-04-21 NOTE — DISCHARGE INSTRUCTIONS
Discharge Instructions  Head Injury    You have been seen today for a head injury. Your evaluation included a history and physical examination. You may have had a CT (CAT) scan performed, though most head injuries do not require a scan. Based on this evaluation, your provider today does not feel that your head injury is serious.    Generally, every Emergency Department visit should have a follow-up clinic visit with either a primary or a specialty clinic/provider. Please follow-up as instructed by your emergency provider today.  Return to the Emergency Department if:  You are confused or you are not acting right.  Your headache gets worse or you start to have a really bad headache even with your recommended treatment plan.  You vomit (throw up) more than once.  You have a seizure.  You have trouble walking.  You have weakness or paralysis (cannot move) in an arm or a leg.  You have blood or fluid coming from your ears or nose.  You have new symptoms or anything that worries you.    Sleeping:  It is okay for you to sleep, but someone should wake you up if instructed by your provider, and someone should check on you at your usual time to wake up.     Activity:  Do not drive for at least 24 hours.  Do not drive if you have dizzy spells or trouble concentrating, or remembering things.  Do not return to any contact sports until cleared by your regular provider.     MORE INFORMATION:    Concussion:  A concussion is a minor head injury that may cause temporary problems with the way the brain works. Although concussions are important, they are generally not an emergency or a reason that a person needs to be hospitalized. Some concussion symptoms include confusion, amnesia (forgetful), nausea (sick to your stomach) and vomiting (throwing up), dizziness, fatigue, memory or concentration problems, irritability and sleep problems. For most people, concussions are mild and temporary but some will have more severe and persistent  symptoms that require on-going care and treatment.  CT Scans: Your evaluation today may have included a CT scan (CAT scan) to look for things like bleeding or a skull fracture (broken bone).  CT scans involve radiation and too many CT scans can cause serious health problems like cancer, especially in children.  Because of this, your provider may not have ordered a CT scan today if they think you are at low risk for a serious or life threatening problem.    If you were given a prescription for medicine here today, be sure to read all of the information (including the package insert) that comes with your prescription.  This will include important information about the medicine, its side effects, and any warnings that you need to know about.  The pharmacist who fills the prescription can provide more information and answer questions you may have about the medicine.  If you have questions or concerns that the pharmacist cannot address, please call or return to the Emergency Department.     Remember that you can always come back to the Emergency Department if you are not able to see your regular provider in the amount of time listed above, if you get any new symptoms, or if there is anything that worries you.     Discharge Instructions  Laceration (Cut)    You were seen today for a laceration (cut).  Your provider examined your laceration for any problems such a buried foreign body (like glass, a splinter, or gravel), or injury to blood vessels, tendons, and nerves.  Your provider may have also rinsed and/or scrubbed your laceration to help prevent an infection. It may not be possible to find all problems with your laceration on the first visit; occasionally foreign bodies or a tendon injury can go undetected.    Your laceration may have been closed in one of several ways:  No closure: many wounds will heal just fine without closure.  Stitches: regular stitches that require removal.  Staples: skin staples are often used  in the scalp/head.  Wound adhesive (glue): skin glue can be used for certain lacerations and doesn?t require removal.  Wound strips (aka Butterfly bandages or steri-strips): these are bandages that help to close a wound.  Absorbable stitches: ?dissolving? stitches that go away on their own and usually don?t require removal.    A small percentage of wounds will develop an infection regardless of how well the wound is cared for. Antibiotics are generally not indicated to prevent an infection so are only given for a small number of high-risk wounds. Some lacerations are too high risk to close, and are left open to heal because closure can increase the likelihood that an infection will develop.    Remember that all lacerations, no matter how expertly repaired, will cause scarring. We consider many factors, techniques, and materials, in our efforts to provide the best possible cosmetic outcome.    Generally, every Emergency Department visit should have a follow-up clinic visit with either a primary or a specialty clinic/provider. Please follow-up as instructed by your emergency provider today.     Return to the Emergency Department right away if:  You have more redness, swelling, pain, drainage (pus), a bad smell, or red streaking from your laceration as these symptoms could indicate an infection.  You have a fever of 100.4 F or more.  You have bleeding that you cannot stop at home. If your cut starts to bleed, hold pressure on the bleeding area with a clean cloth or put pressure over the bandage.  If the bleeding does not stop after using constant pressure for 30 minutes, you should return to the Emergency Department for further treatment.  An area past the laceration is cool, pale, or blue compared with the other side, or has a slower return of color when squeezed.  Your dressing seems too tight or starts to get uncomfortable or painful. For children, signs of a problem might be irritability or restlessness.  You have  loss of normal function or use of an area, such as being unable to straighten or bend a finger normally.  You have a numb area past the laceration.    Return to the Emergency Department or see your regular provider if:  The laceration starts to come open.   You have something coming out of the cut or a feeling that there is something in the laceration.  Your wound will not heal, or keeps breaking open. There can always be glass, wood, dirt or other things in any wound.  They will not always show up, even on x-rays.  If a wound does not heal, this may be why, and it is important to follow-up with your regular provider.    Home Care:  Take your dressing off in 12-24 hours, or as instructed by your provider, to check your laceration. Remove the dressing sooner if it seems too tight or painful, or if it is getting numb, tingly, or pale past the dressing.  Gently wash your laceration 1-2 times daily with clean water and mild soap. It is okay to shower or run clean water over the laceration, but do not let the laceration soak in water (no swimming).  If your laceration was closed with wound adhesive or strips: pat it dry and leave it open to the air. For all other repairs: after you wash your laceration, or at least 2 times a day, apply antibiotic ointment (such as Neosporin  or Bacitracin ) to the laceration, then cover it with a Band-Aid  or gauze.  Keep the laceration clean. Wear gloves or other protective clothing if you are around dirt.    Follow-up for removal:  If your wound was closed with staples or regular stitches, they need to be removed according to the instructions and timeline specified by your provider today.  If your wound was closed with absorbable (?dissolving?) sutures, they should fall out, dissolve, or not be visible in about one week. If they are still visible, then they should be removed according to the instructions and timeline specified by your provider today.    Scars:  To help minimize  scarring:  Wear sunscreen over the healed laceration when out in the sun.  Massage the area regularly once healed.  You may apply Vitamin E to the healed wound.  Wait. Scars improve in appearance over months and years.    If you were given a prescription for medicine here today, be sure to read all of the information (including the package insert) that comes with your prescription.  This will include important information about the medicine, its side effects, and any warnings that you need to know about.  The pharmacist who fills the prescription can provide more information and answer questions you may have about the medicine.  If you have questions or concerns that the pharmacist cannot address, please call or return to the Emergency Department.       Remember that you can always come back to the Emergency Department if you are not able to see your regular provider in the amount of time listed above, if you get any new symptoms, or if there is anything that worries you.

## 2019-04-22 ENCOUNTER — TELEPHONE (OUTPATIENT)
Dept: FAMILY MEDICINE | Facility: CLINIC | Age: 69
End: 2019-04-22

## 2019-04-22 NOTE — TELEPHONE ENCOUNTER
Reason for Call:  Other appointment    Detailed comments: Patient called to schedule an appointment for suture removal. Pt had 3 sutures placed on her forehead at [facility] on 4/21/19.  Pt instructed to return in 1 week for removal. She is requesting an appointment around 4PM.     Phone Number Patient can be reached at: Cell number on file:    Telephone Information:   Mobile [phone]       Best Time: anytime    Can we leave a detailed message on this number? YES    Call taken on 4/22/2019 at 11:35 AM by Gill EDMONDSON

## 2019-04-26 ENCOUNTER — TRANSFERRED RECORDS (OUTPATIENT)
Dept: HEALTH INFORMATION MANAGEMENT | Facility: CLINIC | Age: 69
End: 2019-04-26

## 2019-04-29 ENCOUNTER — ALLIED HEALTH/NURSE VISIT (OUTPATIENT)
Dept: NURSING | Facility: CLINIC | Age: 69
End: 2019-04-29
Payer: MEDICARE

## 2019-04-29 DIAGNOSIS — Z48.02 VISIT FOR SUTURE REMOVAL: Primary | ICD-10-CM

## 2019-04-29 PROCEDURE — 99207 ZZC DROP WITH A PROCEDURE: CPT | Mod: 25

## 2019-04-29 NOTE — PROGRESS NOTES
Carmen Perez presents to the clinic today for removal of sutures.  The patient has had the sutures in place for 8 days.  There has been no history of infection or drainage.  3 sutures are seen located on the forehead.  The wound is healing well with no signs of infection.  Tetanus status is up to date.   All sutures were easily removed today.  Routine wound care discussed.  The patient will follow up as needed.    Wound was dressed with thin layer of bacitracin ointment. Marilyn Benavidez R.N.

## 2019-05-06 DIAGNOSIS — F32.0 MILD MAJOR DEPRESSION (H): ICD-10-CM

## 2019-05-06 RX ORDER — VENLAFAXINE HYDROCHLORIDE 37.5 MG/1
37.5 CAPSULE, EXTENDED RELEASE ORAL DAILY
Qty: 90 CAPSULE | Refills: 0 | Status: CANCELLED | OUTPATIENT
Start: 2019-05-06

## 2019-05-06 RX ORDER — VENLAFAXINE HYDROCHLORIDE 37.5 MG/1
CAPSULE, EXTENDED RELEASE ORAL
Qty: 90 CAPSULE | Refills: 1 | Status: SHIPPED | OUTPATIENT
Start: 2019-05-06 | End: 2019-11-04

## 2019-05-06 NOTE — TELEPHONE ENCOUNTER
Please see 3/4/19 telephone encounter. Prescription approved per [facility] Refill Protocol.  KIM DixonN, RN  [facility]

## 2019-05-06 NOTE — TELEPHONE ENCOUNTER
Duplicate request - see alternate refill request from 5/6/19.  Ayaka Estes, BSN, RN  [facility]

## 2019-05-06 NOTE — TELEPHONE ENCOUNTER
"Requested Prescriptions   Pending Prescriptions Disp Refills     venlafaxine (EFFEXOR-XR) 37.5 MG 24 hr capsule  Last Written Prescription Date:  2/4/2019  Last Fill Quantity: 90 cap,  # refills: 0   Last Office Visit: 2/4/2019 parul    Future Office Visit:      90 capsule 0     Sig: Take 1 capsule (37.5 mg) by mouth daily       Serotonin-Norepinephrine Reuptake Inhibitors  Passed - 5/6/2019  1:48 PM        Passed - Blood pressure under 140/90 in past 12 months     BP Readings from Last 3 Encounters:   04/21/19 134/72   02/04/19 104/72   12/19/17 123/81                 Passed - PHQ-9 score of less than 5 in past 6 months     Please review last PHQ-9 score.           Passed - Medication is active on med list        Passed - Patient is age 18 or older        Passed - No active pregnancy on record        Passed - Normal serum creatinine on file in past 12 months     Recent Labs   Lab Test 04/21/19  1151   CR 0.69             Passed - No positive pregnancy test in past 12 months        Passed - Recent (6 mo) or future (30 days) visit within the authorizing provider's specialty     Patient had office visit in the last 6 months or has a visit in the next 30 days with authorizing provider or within the authorizing provider's specialty.  See \"Patient Info\" tab in inbasket, or \"Choose Columns\" in Meds & Orders section of the refill encounter.            "

## 2019-05-06 NOTE — TELEPHONE ENCOUNTER
"Requested Prescriptions   Pending Prescriptions Disp Refills     venlafaxine (EFFEXOR-XR) 37.5 MG 24 hr capsule [Pharmacy Med Name: VENLAFAXINE ER 37.5MG CAPSULES]  Last Written Prescription Date:  2/4/2019  Last Fill Quantity: 90 cap,  # refills: 0   Last Office Visit: 2/4/2019 parul    Future Office Visit:      90 capsule 0     Sig: TAKE 1 CAPSULE(37.5 MG) BY MOUTH DAILY       Serotonin-Norepinephrine Reuptake Inhibitors  Passed - 5/6/2019 10:45 AM        Passed - Blood pressure under 140/90 in past 12 months     BP Readings from Last 3 Encounters:   04/21/19 134/72   02/04/19 104/72   12/19/17 123/81                 Passed - PHQ-9 score of less than 5 in past 6 months     Please review last PHQ-9 score.      PHQ-9 SCORE 10/2/2017 2/4/2019 3/4/2019   PHQ-9 Total Score 3 6 0     THOMPSON-7 SCORE 10/2/2017 2/4/2019 3/4/2019   Total Score 0 0 1          Passed - Medication is active on med list        Passed - Patient is age 18 or older        Passed - No active pregnancy on record        Passed - Normal serum creatinine on file in past 12 months     Recent Labs   Lab Test 04/21/19  1151   CR 0.69             Passed - No positive pregnancy test in past 12 months        Passed - Recent (6 mo) or future (30 days) visit within the authorizing provider's specialty     Patient had office visit in the last 6 months or has a visit in the next 30 days with authorizing provider or within the authorizing provider's specialty.  See \"Patient Info\" tab in inbasket, or \"Choose Columns\" in Meds & Orders section of the refill encounter.            "

## 2019-05-09 DIAGNOSIS — J44.9 COPD, SEVERE (H): ICD-10-CM

## 2019-05-09 DIAGNOSIS — J43.1 PANLOBULAR EMPHYSEMA (H): ICD-10-CM

## 2019-05-09 NOTE — TELEPHONE ENCOUNTER
"Requested Prescriptions   Pending Prescriptions Disp Refills     INCRUSE ELLIPTA 62.5 MCG/INH Inhaler [Pharmacy Med Name: INCRUSE ELLIPTA 62.5MCG ORAL INH 30]  Last Written Prescription Date:  2/4/19  Last Fill Quantity: 1 inhaler,  # refills: 2   Last office visit: No previous visit found with prescribing provider:  Vicky Ye     Future Office Visit:      0     Sig: INHALE 1 PUFF INTO THE LUNGS DAILY       Asthma Maintenance Inhalers - Anticholinergics Failed - 5/9/2019  4:08 AM        Failed - Asthma control assessment score within normal limits in last 6 months     Please review ACT score.   No flowsheet data found.            Passed - Patient is age 12 years or older        Passed - Medication is active on med list        Passed - Recent (6 mo) or future (30 days) visit within the authorizing provider's specialty     Patient had office visit in the last 6 months or has a visit in the next 30 days with authorizing provider or within the authorizing provider's specialty.  See \"Patient Info\" tab in inbasket, or \"Choose Columns\" in Meds & Orders section of the refill encounter.              "

## 2019-05-10 NOTE — TELEPHONE ENCOUNTER
Spirometry one time completed. ACT not needed for diagnosis. Prescription approved per [facility] RN Refill Protocol. Marilyn Benavidez R.N.

## 2019-06-06 DIAGNOSIS — J44.9 COPD, SEVERE (H): ICD-10-CM

## 2019-06-06 DIAGNOSIS — J43.1 PANLOBULAR EMPHYSEMA (H): ICD-10-CM

## 2019-06-06 NOTE — TELEPHONE ENCOUNTER
"Requested Prescriptions   Pending Prescriptions Disp Refills     Ipratropium-Albuterol (COMBIVENT RESPIMAT)  MCG/ACT inhaler  Last Written Prescription Date:  02/04/2019  Last Fill Quantity: 4 g,  # refills: 1    Last Office Visit: 02/04/2019 Vicky Ye PA-C       Future Office Visit:      4 g 1     Sig: INHALE 1 PUFF INTO THE LUNGS FOUR TIMES DAILY AS NEEDED FOR SHORTNESS OF BREATH OR DIFFICULT BREATHING OR WHEEZING       Asthma Maintenance Inhalers - Anticholinergics Failed - 6/6/2019  3:17 PM        Failed - Asthma control assessment score within normal limits in last 6 months     Please review ACT score.   No flowsheet data found.          Passed - Patient is age 12 years or older        Passed - Medication is active on med list        Passed - Recent (6 mo) or future (30 days) visit within the authorizing provider's specialty     Patient had office visit in the last 6 months or has a visit in the next 30 days with authorizing provider or within the authorizing provider's specialty.  See \"Patient Info\" tab in inbasket, or \"Choose Columns\" in Meds & Orders section of the refill encounter.              "

## 2019-06-06 NOTE — TELEPHONE ENCOUNTER
Pharmacy note:    Pt would like a 3 month supply on this if possible. Could you send a new Rx? Thanks.

## 2019-06-07 NOTE — TELEPHONE ENCOUNTER
Patient has completed one time spirometry. Prescription approved per [facility] RN Refill Protocol. Marilyn Benavidez R.N.

## 2019-10-16 ENCOUNTER — TELEPHONE (OUTPATIENT)
Dept: FAMILY MEDICINE | Facility: CLINIC | Age: 69
End: 2019-10-16

## 2019-10-16 NOTE — TELEPHONE ENCOUNTER
Date Forms was received: October 16, 2019    Forms received by: Fax    Purpose of Form:  [employer] forms    When the form is due:  ASAP    How the form needs to be returned for patient:  Fax    Form currently placed  LH inbox

## 2019-11-04 ENCOUNTER — MYC REFILL (OUTPATIENT)
Dept: FAMILY MEDICINE | Facility: CLINIC | Age: 69
End: 2019-11-04

## 2019-11-04 DIAGNOSIS — J43.1 PANLOBULAR EMPHYSEMA (H): ICD-10-CM

## 2019-11-04 DIAGNOSIS — F32.0 MILD MAJOR DEPRESSION (H): ICD-10-CM

## 2019-11-04 DIAGNOSIS — J44.9 COPD, SEVERE (H): ICD-10-CM

## 2019-11-04 DIAGNOSIS — R60.0 BILATERAL LOWER EXTREMITY EDEMA: ICD-10-CM

## 2019-11-04 RX ORDER — VENLAFAXINE HYDROCHLORIDE 37.5 MG/1
37.5 CAPSULE, EXTENDED RELEASE ORAL DAILY
Qty: 90 CAPSULE | Refills: 1 | Status: CANCELLED | OUTPATIENT
Start: 2019-11-04

## 2019-11-04 RX ORDER — FUROSEMIDE 20 MG
20 TABLET ORAL DAILY
Qty: 90 TABLET | Refills: 3 | Status: CANCELLED | OUTPATIENT
Start: 2019-11-04

## 2019-11-04 NOTE — TELEPHONE ENCOUNTER
Left message. Patient needs PHQ9.  I sent phq9 via [employer]    Patient states she only has one pill left.  Routing to PCP    Rocio Mireles RN- Triage [employer]

## 2019-11-04 NOTE — TELEPHONE ENCOUNTER
"Requested Prescriptions   Pending Prescriptions Disp Refills     venlafaxine (EFFEXOR-XR) 37.5 MG 24 hr capsule [Pharmacy Med Name: VENLAFAXINE ER 37.5MG CAPSULES]  Last Written Prescription Date:  5/6/2019  Last Fill Quantity: 90 capsule,  # refills: 1   Last office visit: 2/4/2019 [facility] Office Visit:       90 capsule 0     Sig: TAKE 1 CAPSULE(37.5 MG) BY MOUTH DAILY       Serotonin-Norepinephrine Reuptake Inhibitors  Failed - 11/4/2019 10:12 AM        Failed - PHQ-9 score of less than 5 in past 6 months     Please review last PHQ-9 score.     PHQ-9 SCORE 10/2/2017 2/4/2019 3/4/2019   PHQ-9 Total Score 3 6 0     THOMPSON-7 SCORE 10/2/2017 2/4/2019 3/4/2019   Total Score 0 0 1           Failed - Recent (6 mo) or future (30 days) visit within the authorizing provider's specialty     Patient had office visit in the last 6 months or has a visit in the next 30 days with authorizing provider or within the authorizing provider's specialty.  See \"Patient Info\" tab in inbasket, or \"Choose Columns\" in Meds & Orders section of the refill encounter.            Passed - Blood pressure under 140/90 in past 12 months     BP Readings from Last 3 Encounters:   04/21/19 134/72   02/04/19 104/72   12/19/17 123/81             Passed - Medication is active on med list        Passed - Patient is age 18 or older        Passed - No active pregnancy on record        Passed - Normal serum creatinine on file in past 12 months     Recent Labs   Lab Test 04/21/19  1151   CR 0.69             Passed - No positive pregnancy test in past 12 months                  COMBIVENT RESPIMAT  MCG/ACT inhaler [Pharmacy Med Name: COMBIVENT RESPIMAT ORAL 120SPRAY 4G]  Last Written Prescription Date:  6/7/2019  Last Fill Quantity: 4 g,  # refills: 1   Last office visit: 2/4/2019 [facility] Office Visit:       4 g 0     Sig: INHALE 1 PUFF INTO THE LUNGS FOUR TIMES DAILY AS NEEDED FOR SHORTNESS OF BREATH OR DIFFICULT BREATHING OR WHEEZING " "      Asthma Maintenance Inhalers - Anticholinergics Failed - 11/4/2019 10:12 AM        Failed - Asthma control assessment score within normal limits in last 6 months     Please review ACT score.    No flowsheet data found.             Failed - Recent (6 mo) or future (30 days) visit within the authorizing provider's specialty     Patient had office visit in the last 6 months or has a visit in the next 30 days with authorizing provider or within the authorizing provider's specialty.  See \"Patient Info\" tab in inbasket, or \"Choose Columns\" in Meds & Orders section of the refill encounter.            Passed - Patient is age 12 years or older        Passed - Medication is active on med list        "

## 2019-11-04 NOTE — TELEPHONE ENCOUNTER
Pt called in and stated she one has 1 pill left, please call pt if you are unable to fille them today.    An Starks  Patient

## 2019-11-05 DIAGNOSIS — F32.0 MILD MAJOR DEPRESSION (H): ICD-10-CM

## 2019-11-05 RX ORDER — VENLAFAXINE HYDROCHLORIDE 37.5 MG/1
CAPSULE, EXTENDED RELEASE ORAL
Qty: 30 CAPSULE | Refills: 0 | Status: SHIPPED | OUTPATIENT
Start: 2019-11-05 | End: 2019-11-05

## 2019-11-05 RX ORDER — IPRATROPIUM BROMIDE AND ALBUTEROL 20; 100 UG/1; UG/1
SPRAY, METERED RESPIRATORY (INHALATION)
Qty: 4 G | Refills: 0 | Status: SHIPPED | OUTPATIENT
Start: 2019-11-05 | End: 2020-03-04

## 2019-11-05 RX ORDER — VENLAFAXINE HYDROCHLORIDE 37.5 MG/1
37.5 CAPSULE, EXTENDED RELEASE ORAL DAILY
Qty: 90 CAPSULE | Refills: 0 | Status: SHIPPED | OUTPATIENT
Start: 2019-11-05 | End: 2020-03-04

## 2019-11-05 NOTE — TELEPHONE ENCOUNTER
Refilled as PHQ at goal. Please remind she will be due for annual physical again in February. Please assist in scheduling an appt as will need to be seen at that time.  Electronically Signed By: Vicky Ye PA-C

## 2019-11-05 NOTE — TELEPHONE ENCOUNTER
"Requested Prescriptions   Pending Prescriptions Disp Refills     fluticasone-salmeterol (ADVAIR DISKUS) 250-50 MCG/DOSE inhaler  Last Written Prescription Date:  2/4/2019  Last Fill Quantity: 1 Inhaler,  # refills: 2   Last office visit: 2/4/2019 [facility]     Future Office Visit:       1 Inhaler 2     Sig: Inhale 1 puff into the lungs 2 times daily       Inhaled Steroids Protocol Failed - 11/5/2019 11:16 AM        Failed - Asthma control assessment score within normal limits in last 6 months     Please review ACT score.     No flowsheet data found.            Failed - Recent (6 mo) or future (30 days) visit within the authorizing provider's specialty     Patient had office visit in the last 6 months or has a visit in the next 30 days with authorizing provider or within the authorizing provider's specialty.  See \"Patient Info\" tab in inbasket, or \"Choose Columns\" in Meds & Orders section of the refill encounter.            Passed - Patient is age 12 or older        Passed - Medication is active on med list                  furosemide (LASIX) 20 MG tablet  Medication may not be due for a refill.  Last Written Prescription Date:  2/4/2019  Last Fill Quantity: 90 tablet,  # refills: 3   Last office visit: 2/4/2019 [facility]    Future Office Visit:       90 tablet 3     Sig: Take 1 tablet (20 mg) by mouth daily       Diuretics (Including Combos) Protocol Passed - 11/5/2019 11:16 AM        Passed - Blood pressure under 140/90 in past 12 months     BP Readings from Last 3 Encounters:   04/21/19 134/72   02/04/19 104/72   12/19/17 123/81                 Passed - Recent (12 mo) or future (30 days) visit within the authorizing provider's specialty     Patient has had an office visit with the authorizing provider or a provider within the authorizing providers department within the previous 12 mos or has a future within next 30 days. See \"Patient Info\" tab in inbasket, or \"Choose Columns\" in Meds & Orders section of the refill " "encounter.              Passed - Medication is active on med list        Passed - Patient is age 18 or older        Passed - No active pregancy on record        Passed - Normal serum creatinine on file in past 12 months     Recent Labs   Lab Test 04/21/19  1151   CR 0.69              Passed - Normal serum potassium on file in past 12 months     Recent Labs   Lab Test 04/21/19  1151   POTASSIUM 4.4              Passed - Normal serum sodium on file in past 12 months     Recent Labs   Lab Test 04/21/19  1151                 Passed - No positive pregnancy test in past 12 months                  venlafaxine (EFFEXOR-XR) 37.5 MG 24 hr capsule  Last Written Prescription Date:  5/6/2019  Last Fill Quantity: 90 capsule,  # refills: 1   Last office visit: 2/4/2019 [facility]    Future Office Visit:       90 capsule 1     Sig: Take 1 capsule (37.5 mg) by mouth daily       Serotonin-Norepinephrine Reuptake Inhibitors  Failed - 11/5/2019 11:16 AM        Failed - PHQ-9 score of less than 5 in past 6 months     Please review last PHQ-9 score.     PHQ-9 SCORE 2/4/2019 3/4/2019 11/5/2019   PHQ-9 Total Score MyChart - - 0   PHQ-9 Total Score 6 0 0     THOMPSON-7 SCORE 10/2/2017 2/4/2019 3/4/2019   Total Score 0 0 1           Failed - Recent (6 mo) or future (30 days) visit within the authorizing provider's specialty     Patient had office visit in the last 6 months or has a visit in the next 30 days with authorizing provider or within the authorizing provider's specialty.  See \"Patient Info\" tab in inbasket, or \"Choose Columns\" in Meds & Orders section of the refill encounter.            Passed - Blood pressure under 140/90 in past 12 months     BP Readings from Last 3 Encounters:   04/21/19 134/72   02/04/19 104/72   12/19/17 123/81                 Passed - Medication is active on med list        Passed - Patient is age 18 or older        Passed - No active pregnancy on record        Passed - Normal serum creatinine on file in past " "12 months     Recent Labs   Lab Test 04/21/19  1151   CR 0.69             Passed - No positive pregnancy test in past 12 months                  umeclidinium (INCRUSE ELLIPTA) 62.5 MCG/INH inhaler  Last Written Prescription Date:  5/10/2019  Last Fill Quantity: 1 Inhaler,  # refills: 2   Last office visit: 2/4/2019 [facility]    Future Office Visit:       1 Inhaler 2       Asthma Maintenance Inhalers - Anticholinergics Failed - 11/5/2019 11:16 AM        Failed - Asthma control assessment score within normal limits in last 6 months     Please review ACT score.     No flowsheet data found.            Failed - Recent (6 mo) or future (30 days) visit within the authorizing provider's specialty     Patient had office visit in the last 6 months or has a visit in the next 30 days with authorizing provider or within the authorizing provider's specialty.  See \"Patient Info\" tab in inbasket, or \"Choose Columns\" in Meds & Orders section of the refill encounter.            Passed - Patient is age 12 years or older        Passed - Medication is active on med list                  Ipratropium-Albuterol (COMBIVENT RESPIMAT)  MCG/ACT inhaler  Last Written Prescription Date:  6/7/2019  Last Fill Quantity: 4 g,  # refills: 1   Last office visit: 2/4/2019 [facility]    Future Office Visit:       4 g 1     Sig: INHALE 1 PUFF INTO THE LUNGS FOUR TIMES DAILY AS NEEDED FOR SHORTNESS OF BREATH OR DIFFICULT BREATHING OR WHEEZING       Asthma Maintenance Inhalers - Anticholinergics Failed - 11/5/2019 11:16 AM        Failed - Asthma control assessment score within normal limits in last 6 months     Please review ACT score.     No flowsheet data found.            Failed - Recent (6 mo) or future (30 days) visit within the authorizing provider's specialty     Patient had office visit in the last 6 months or has a visit in the next 30 days with authorizing provider or within the authorizing provider's specialty.  See \"Patient Info\" tab in " "inbasket, or \"Choose Columns\" in Meds & Orders section of the refill encounter.            Passed - Patient is age 12 years or older        Passed - Medication is active on med list        "

## 2019-11-05 NOTE — TELEPHONE ENCOUNTER
Temp refill of inhalers, but pt overdue for follow-up per last consults from [facility]. Please see other refill encounter, but pt needs a call to schedule physical with me in February and please remind she is due for pulmonology follow-up and they should be ordering her inhalers.  Electronically Signed By: Vicky Ye PA-C

## 2019-11-06 RX ORDER — VENLAFAXINE HYDROCHLORIDE 37.5 MG/1
CAPSULE, EXTENDED RELEASE ORAL
Qty: 90 CAPSULE | Refills: 0 | OUTPATIENT
Start: 2019-11-06

## 2019-11-06 NOTE — TELEPHONE ENCOUNTER
Duplicate Rx. Patient was given 90 day supply of medication 11/5/19 to the same pharmacy.     Casi Abreu RN, BSN  [facility] [place] [facility]

## 2019-11-06 NOTE — TELEPHONE ENCOUNTER
I see that [place] was sent MyChart message in another encounter by   regarding refills and scheduling, will close this encounter as duplication of information. Marilyn Benavidez R.N.

## 2019-11-06 NOTE — TELEPHONE ENCOUNTER
Routing to  to call patient with below information and see if patient wants to schedule upcoming visit. Marilyn Benavidez R.N.

## 2020-01-11 ENCOUNTER — APPOINTMENT (OUTPATIENT)
Dept: CT IMAGING | Facility: CLINIC | Age: 70
End: 2020-01-11
Attending: EMERGENCY MEDICINE
Payer: MEDICARE

## 2020-01-11 ENCOUNTER — HOSPITAL ENCOUNTER (EMERGENCY)
Facility: CLINIC | Age: 70
Discharge: HOME | End: 2020-01-11
Attending: EMERGENCY MEDICINE | Admitting: EMERGENCY MEDICINE
Payer: MEDICARE

## 2020-01-11 VITALS
DIASTOLIC BLOOD PRESSURE: 89 MMHG | BODY MASS INDEX: 28.15 KG/M2 | SYSTOLIC BLOOD PRESSURE: 142 MMHG | WEIGHT: 164 LBS | RESPIRATION RATE: 16 BRPM | OXYGEN SATURATION: 95 % | TEMPERATURE: 98.7 F | HEART RATE: 105 BPM

## 2020-01-11 DIAGNOSIS — R19.5 ABNORMAL FECES: Primary | ICD-10-CM

## 2020-01-11 LAB
ALBUMIN SERPL-MCNC: 4 G/DL (ref 3.4–5)
ALBUMIN UR-MCNC: NEGATIVE MG/DL
ALP SERPL-CCNC: 20 U/L (ref 40–150)
ALT SERPL W P-5'-P-CCNC: 24 U/L (ref 0–50)
ANION GAP SERPL CALCULATED.3IONS-SCNC: 3 MMOL/L (ref 3–14)
APPEARANCE UR: CLEAR
AST SERPL W P-5'-P-CCNC: 26 U/L (ref 0–45)
BASOPHILS # BLD AUTO: 0 10E9/L (ref 0–0.2)
BASOPHILS NFR BLD AUTO: 0.4 %
BILIRUB SERPL-MCNC: 0.2 MG/DL (ref 0.2–1.3)
BILIRUB UR QL STRIP: NEGATIVE
BUN SERPL-MCNC: 15 MG/DL (ref 7–30)
CALCIUM SERPL-MCNC: 9.9 MG/DL (ref 8.5–10.1)
CHLORIDE SERPL-SCNC: 102 MMOL/L (ref 94–109)
CO2 SERPL-SCNC: 34 MMOL/L (ref 20–32)
COLOR UR AUTO: ABNORMAL
CREAT SERPL-MCNC: 0.6 MG/DL (ref 0.52–1.04)
DIFFERENTIAL METHOD BLD: ABNORMAL
EOSINOPHIL # BLD AUTO: 0.2 10E9/L (ref 0–0.7)
EOSINOPHIL NFR BLD AUTO: 3.1 %
ERYTHROCYTE [DISTWIDTH] IN BLOOD BY AUTOMATED COUNT: 13 % (ref 10–15)
GFR SERPL CREATININE-BSD FRML MDRD: >90 ML/MIN/{1.73_M2}
GLUCOSE SERPL-MCNC: 97 MG/DL (ref 70–99)
GLUCOSE UR STRIP-MCNC: NEGATIVE MG/DL
HCT VFR BLD AUTO: 38 % (ref 35–47)
HGB BLD-MCNC: 11.1 G/DL (ref 11.7–15.7)
HGB UR QL STRIP: NEGATIVE
IMM GRANULOCYTES # BLD: 0 10E9/L (ref 0–0.4)
IMM GRANULOCYTES NFR BLD: 0.3 %
KETONES UR STRIP-MCNC: ABNORMAL MG/DL
LEUKOCYTE ESTERASE UR QL STRIP: ABNORMAL
LYMPHOCYTES # BLD AUTO: 0.9 10E9/L (ref 0.8–5.3)
LYMPHOCYTES NFR BLD AUTO: 11.7 %
MCH RBC QN AUTO: 25.9 PG (ref 26.5–33)
MCHC RBC AUTO-ENTMCNC: 29.2 G/DL (ref 31.5–36.5)
MCV RBC AUTO: 89 FL (ref 78–100)
MONOCYTES # BLD AUTO: 0.5 10E9/L (ref 0–1.3)
MONOCYTES NFR BLD AUTO: 7.3 %
NEUTROPHILS # BLD AUTO: 5.8 10E9/L (ref 1.6–8.3)
NEUTROPHILS NFR BLD AUTO: 77.2 %
NITRATE UR QL: NEGATIVE
NRBC # BLD AUTO: 0 10*3/UL
NRBC BLD AUTO-RTO: 0 /100
PH UR STRIP: 8 PH (ref 5–7)
PLATELET # BLD AUTO: 304 10E9/L (ref 150–450)
POTASSIUM SERPL-SCNC: 4.1 MMOL/L (ref 3.4–5.3)
PROT SERPL-MCNC: 7.7 G/DL (ref 6.8–8.8)
RBC # BLD AUTO: 4.28 10E12/L (ref 3.8–5.2)
RBC #/AREA URNS AUTO: 2 /HPF (ref 0–2)
SODIUM SERPL-SCNC: 139 MMOL/L (ref 133–144)
SOURCE: ABNORMAL
SP GR UR STRIP: 1 (ref 1–1.03)
SQUAMOUS #/AREA URNS AUTO: <1 /HPF (ref 0–1)
UROBILINOGEN UR STRIP-MCNC: NORMAL MG/DL (ref 0–2)
WBC # BLD AUTO: 7.4 10E9/L (ref 4–11)
WBC #/AREA URNS AUTO: 6 /HPF (ref 0–5)

## 2020-01-11 PROCEDURE — 81001 URINALYSIS AUTO W/SCOPE: CPT | Performed by: EMERGENCY MEDICINE

## 2020-01-11 PROCEDURE — 74177 CT ABD & PELVIS W/CONTRAST: CPT

## 2020-01-11 PROCEDURE — 99285 EMERGENCY DEPT VISIT HI MDM: CPT | Mod: 25

## 2020-01-11 PROCEDURE — 85025 COMPLETE CBC W/AUTO DIFF WBC: CPT | Performed by: EMERGENCY MEDICINE

## 2020-01-11 PROCEDURE — 25000125 ZZHC RX 250: Performed by: EMERGENCY MEDICINE

## 2020-01-11 PROCEDURE — 25000128 H RX IP 250 OP 636: Performed by: EMERGENCY MEDICINE

## 2020-01-11 PROCEDURE — 80053 COMPREHEN METABOLIC PANEL: CPT | Performed by: EMERGENCY MEDICINE

## 2020-01-11 RX ORDER — IOPAMIDOL 755 MG/ML
500 INJECTION, SOLUTION INTRAVASCULAR ONCE
Status: COMPLETED | OUTPATIENT
Start: 2020-01-11 | End: 2020-01-11

## 2020-01-11 RX ADMIN — SODIUM CHLORIDE 60 ML: 9 INJECTION, SOLUTION INTRAVENOUS at 12:50

## 2020-01-11 RX ADMIN — IOPAMIDOL 82 ML: 755 INJECTION, SOLUTION INTRAVENOUS at 12:50

## 2020-01-11 ASSESSMENT — ENCOUNTER SYMPTOMS
DYSURIA: 0
ABDOMINAL PAIN: 0
FEVER: 0

## 2020-01-11 NOTE — CONSULTS
Colon and Rectal Surgery Consultation         Carmen Perez    MRN# [id number]   YOB: 1950 Age: 69 year old   Date of Admission: 2020  Date of Consult: 2020          Assessment and Plan:      Mucus/stool per rectal stump    - Enemas to clean out stump  - Some mucus discharge is normal  - Provided information for our clinic, may contact us as needed             Primary Care Physician:      Vicky Ye [phone]         Requesting Physician:      [place]         Chief Complaint:      Mucus and stool per rectum  History is obtained from the patient.         History of Present Illness:      Carmen Perez is a 69 year old female COPD on O2, history of right sided CHF, HTN and prior Chris's 2016 for perforated diverticulitis, well funcitoning ostomy presents today for new discharge per rectum.     Never had any prior mucus or other discharge. Denies abdominal pain, nausea, vomiting. Ostomy is productive of stool and gas. Denies any stool or gas per vagina or urine. Denies fevers or chills.    Colonoscopy in 2016 through colostomy, no polyps or abnormalities per patient.               Past Medical History:        Past Medical History:   Diagnosis Date     Asthma     worse in the summer     COPD (chronic obstructive pulmonary disease) (H)     previous smoker     Cor pulmonale (H)      Hyperkalemia 2012     Mild major depression (H)      Myocardial infarction (H)     on furosemide             Past Surgical History:        Past Surgical History:   Procedure Laterality Date      SECTION       HERNIORRHAPHY INCISIONAL (LOCATION) N/A 2016    Procedure: HERNIORRHAPHY INCISIONAL (LOCATION);  Surgeon: Bronson Ornelas MD;  Location:  OR     LAPAROSCOPIC ASSISTED COLECTOMY N/A 2016    Procedure: LAPAROSCOPIC ASSISTED COLECTOMY;  Surgeon: Bronson Ornelas MD;  Location:  OR     LAPAROSCOPIC ASSISTED COLOSTOMY N/A 2016    Procedure: LAPAROSCOPIC  ASSISTED COLOSTOMY;  Surgeon: Bronson Ornelas MD;  Location: [facility] OR     LAPAROSCOPIC TUBAL LIGATION                   Home Medications:        Prior to Admission medications    Medication Sig Last Dose Taking? Auth Provider   ACE/ARB/ARNI NOT PRESCRIBED (INTENTIONAL) Please choose reason not prescribed, below   Vicky Ye PA-C   acetaminophen (TYLENOL) 325 MG tablet Take 2 tablets (650 mg) by mouth every 6 hours as needed for mild pain   Keerthi Danielson PA-C   calcium carb-cholecalciferol 600-500 MG-UNIT CAPS    Reported, Patient   COMBIVENT RESPIMAT  MCG/ACT inhaler INHALE 1 PUFF INTO THE LUNGS FOUR TIMES DAILY AS NEEDED FOR SHORTNESS OF BREATH OR DIFFICULT BREATHING OR WHEEZING   Vicky Ye PA-C   fluticasone-salmeterol (ADVAIR DISKUS) 250-50 MCG/DOSE inhaler Inhale 1 puff into the lungs 2 times daily   Vicky Ye PA-C   furosemide (LASIX) 20 MG tablet Take 1 tablet (20 mg) by mouth daily   Vicky Ye PA-C   umeclidinium (INCRUSE ELLIPTA) 62.5 MCG/INH inhaler Inhale 1 puff into the lungs daily   Vicky Ye PA-C   venlafaxine (EFFEXOR-XR) 37.5 MG 24 hr capsule Take 1 capsule (37.5 mg) by mouth daily   Vicky Ye PA-C            Current Medications:                 Allergies:   No Known Allergies         Social History:        Social History     Tobacco Use     Smoking status: Former Smoker     Packs/day: 0.25     Types: Cigarettes     Last attempt to quit: 2012     Years since quittin.5     Smokeless tobacco: Never Used   Substance Use Topics     Alcohol use: No     Alcohol/week: 0.0 standard drinks             Family History:        Family History   Problem Relation Age of Onset     C.A.D. Mother          in her 60's             Review of Systems:      The 10 point Review of Systems is negative other than noted in the HPI.            Physical Exam:      Blood pressure (!) 142/89,  "pulse 91, temperature 98.7  F (37.1  C), temperature source Temporal, resp. rate 16, weight 74.4 kg (164 lb), SpO2 96 %, not currently breastfeeding.  Vitals:    20 1130   Weight: 74.4 kg (164 lb)     Vital Sign Ranges  Temperature Temp  Av.7  F (37.1  C)  Min: 98.7  F (37.1  C)  Max: 98.7  F (37.1  C)   Blood pressure Systolic (24hrs), Av , Min:142 , Max:154        Diastolic (24hrs), Av, Min:81, Max:89      Pulse Pulse  Av  Min: 69  Max: 106   Respirations Resp  Av  Min: 16  Max: 16   Pulse oximetry SpO2  Av.6 %  Min: 95 %  Max: 98 %       No intake or output data in the 24 hours ending 20 1454    Constitutional: Awake, alert, cooperative, no apparent distress   Psych: Alert, oriented to person, place and time, no obvious anxiety or depression   Neuro: Cranial nerves 2-12 intact, normal strength and sensation.   Eyes: Conjunctiva and pupils examined and normal.   ENT: Moist mucous membranes, normal dentition.   GI: Abd soft, non-distended, non-tender, well healed scar, ostomy produtive of stool, difficult to palpate hernia defects but abdominal exam benign.     ELIGIO with hypertonic sphincter, no blood no masses unable to palpate stool ball.    Skin: No rashes, no cyanosis, no edema   Musculoskeletal: No joint swelling, erythema or tenderness.          Data:      All new lab and imaging data was reviewed.   Recent Labs   Lab Test 20  1155 19  1151 19  1725   WBC 7.4 9.7 7.5   HGB 11.1* 10.8* 11.4*    295 321      Recent Labs   Lab Test 20  1155 19  1151 19  1725    142 137   POTASSIUM 4.1 4.4 4.5   CHLORIDE 102 106 101   CO2 34* 32 31   BUN 15 16 11   CR 0.60 0.69 0.74   ANIONGAP 3 4 5   OLI 9.9 9.7 10.1   GLC 97 104* 92       CT Abd / Pelvis 2020  \"IMPRESSION:  1. There appears to be stool in the rectal stump. A definite fistulous  communication to the cecum is not demonstrated but cannot be entirely  excluded.  2. 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 Both of these hernias contain bowel. No evidence  of obstruction.\"    Karrie Gonzales MD  [facility]      "

## 2020-01-11 NOTE — ED TRIAGE NOTES
Pt has had a colostomy since 2016.  Presents today after having a yellowish type stool from her rectum today.  Also noticed an uncomfortable pressure when urinating.

## 2020-01-11 NOTE — ED PROVIDER NOTES
History     Chief Complaint:  Pelvic Pain      HPI   Carmen Perez is a 69 year old female with past medical history of diverticulitis S/P colostomy who presents to the emergency department for evaluation of pelvic pain.  The patient reports she had a perforated diverticula S/P sigmoid resection with colostomy since 16 by Dr. Ornelas. This procedure was a sigmoid resection She reports for the past two days she has noticed some pressure on her bladder when urinating. She reports today she had a yellowish stool (unsure if it was mixed with urine) from her rectum. Due to these symptoms she presented to the emergency department today. She has had a UTI in the past. She hasn't had any recent surgeries or antibiotics. She denies abdominal pain, fever, dysuria.     Allergies:  No Known Drug Allergies      Medications:    Lasix  Venlafaxine  Wellbutrin  Colace  Foxamax  Albuterol     Past Medical History:    Asthma  COPD  Cor pulmonale  Hyperkalemia  Depression  MI  Diverticulitis  Peritonitis  Cervical fractures  Rheumatoid arthritis    Past Surgical History:     section  Herniorrhaphy incisional  Laparoscopic assisted colectomy  Laparoscopic assisted colostomy  Laparoscopic tubal ligation    Family History:    CAD    Social History:  The patient was accompanied to the ED by son.  Smoking Status: Former Smoker  Smokeless Tobacco: Never Used  Alcohol Use: No   Marital Status:  Single [1]       Review of Systems   Constitutional: Negative for fever.   Gastrointestinal: Negative for abdominal pain.   Genitourinary: Negative for dysuria.   All other systems reviewed and are negative.      Physical Exam   First Vitals:  BP: (!) 154/81  Pulse: 106  Heart Rate: 111  Temp: 98.7  F (37.1  C)  Resp: 16  Weight: 74.4 kg (164 lb)  SpO2: 97 %    Physical Exam  General: Alert, appears well-developed and well-nourished. Cooperative.     In mild distress  HEENT:  Head:  Atraumatic  Ears:  External ears are normal  Mouth/Throat:   Oropharynx is without erythema or exudate and mucous membranes are moist.   Eyes:   Conjunctivae normal and EOM are normal. No scleral icterus.    Pupils are equal, round, and reactive to light.   Neck:   Normal range of motion. Neck supple.  CV:  Normal rate, regular rhythm, normal heart sounds and radial pulses are 2+ and symmetric.  No murmur.  Resp:  Breath sounds are clear bilaterally    Non-labored, no retractions or accessory muscle use  GI:  Abdomen is soft, no distension, no tenderness. No rebound or guarding.  Colostomy present to left lower abdomen.  No CVA tenderness bilaterally  Pelvic: Exam performed in presence of female nurse.  Patient with normal external genitalia.  Mild erythema to the labia and perineum but no obvious discharge.  Rectal exam showed no obvious discharge.  There was no anal fissure or hemorrhoids noted.  No rectal bleeding.  MS:  Normal range of motion. No edema.    Normal strength in all 4 extremities.     Back atraumatic.    No midline cervical, thoracic, or lumbar tenderness  Skin:  Warm and dry.  No rash or lesions noted.  Neuro: Alert. Normal strength.   GCS: 15  Psych:  Normal mood and affect.    Emergency Department Course     Imaging:  Radiology findings were communicated with the patient who voiced understanding of the findings.    CT Abdomen/Pelvis w Contrast:  IMPRESSION:  1. There appears to be stool in the rectal stump. A definite fistulous  communication to the cecum is not demonstrated but cannot be entirely  excluded.  2. 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 Both of these hernias contain bowel. No evidence  of obstruction.  Report per radiology       Laboratory:  Laboratory findings were communicated with the patient who voiced understanding of the findings.    CBC: WBC 7.4, HGB 11.1 (L),    CMP: Carbon Dioxide 34 (H), Alk phos 20 (L) o/w WNL. (Creatinine 0.60)     UA: Straw and Clear. Urineketon Trace, pH  Urine 8.0 (H), Leukocyte Esterase Urine Moderate, WBC/HPF 6 (H) o/w WNL        Emergency Department Course:  Nursing notes and vitals reviewed.  1134: I performed an exam of the patient as documented above.    IV was inserted and blood was drawn for laboratory testing, results above.    The patient provided a urine sample here in the emergency department. This was sent for laboratory testing, findings above.    The patient was sent for a CT Abdomen Pelvis while in the emergency department, results above.      1341 I spoke with Dr. Gonzales of the Colorectal Surgery service regarding patient's presentation, findings, and plan of care.      1353 I spoke with Dr. Mejia of the General Surgery service regarding patient's presentation, findings, and plan of care.     1453 Patient rechecked and updated.      Findings and plan explained to the Patient. Patient discharged home with instructions regarding supportive care, medications, and reasons to return. The importance of close follow-up was reviewed.   I personally reviewed the laboratory and imaging results with the Patient and answered all related questions prior to  discharge.     Impression & Plan      Medical Decision Making:  Patient is a 69-year-old female status post perforated diverticulitis with subsequent sigmoid colectomy and colostomy placement who presents with rectal output.  Patient notes that she recently had stool today from her rectum which is unusual as she is never had stool output from the rectum since her colostomy in 2016.  On review of her prior surgeries it appears she had a colostomy placed secondary to perforated sigmoid diverticulitis.  Reassuringly patient has no abdominal pain here, no fever, and normal vital signs.  We did obtain CT imaging of the abdomen out of concern for fistula formation, and reassuringly CT scan shows no evidence of sinister intra-abdominal pathologies.  There is stool within the rectal stump but a definite fistulous  communication to the cecum cannot entirely be excluded based on imaging today. There is no sign of obstruction which is reassuring.  I did talk with Dr. Mejia on behalf of Dr. Ornelas, the patient's initial surgeon for her colostomy and they ultimately recommended referral to colorectal surgery if there were to be any complications several years after initial colostomy placement.  I spoke with Dr. Gonzales of colorectal surgery who I reviewed imaging studies with and patient's history today.  I appreciate her assistance in consulting with the patient here in the emergency department and ultimately they had recommended trialing a course of tapwater enemas at home to determine if this would help clear the rectal stool.  If for some reason patient were to continue having bowel movements from the rectum over the next several days and her weeks she could follow-up  with colorectal surgery clinic to determine if additional work-up would be needed if this may represent a fistula. Ultimately patient is stable here and has no concerns or needs for admission to the hospital at this time.  Return precautions given for development of fever, abdominal pain, or vomiting.  Return precautions understood and all questions answered, patient discharged home in care of son.      Diagnosis:    ICD-10-CM    1. Abnormal feces R19.5     possibility of cecal-rectal fistula, s/p colostomy in 2016       Disposition:  Discharged to home.      Scribe Disclosure:  I, Pat Beatty, am serving as a scribe at 1:44 PM on 1/11/2020 to document services personally performed by Yoav Deluca MD based on my observations and the provider's statements to me.    Pat Beatty  1/11/2020   [facility] EMERGENCY DEPARTMENT       Yoav Deluca MD  01/12/20 0939

## 2020-01-11 NOTE — ED AVS SNAPSHOT
[facility]  [address]  [facility] [address]  Phone:  [phone]  Fax:  [phone]                                    Carmen Perez   MRN: [id number]    Department:  [facility]   Date of Visit:  1/11/2020           After Visit Summary Signature Page    I have received my discharge instructions, and my questions have been answered. I have discussed any challenges I see with this plan with the nurse or doctor.    ..........................................................................................................................................  Patient/Patient Representative Signature      ..........................................................................................................................................  Patient Representative Print Name and Relationship to Patient    ..................................................               ................................................  Date                                   Time    ..........................................................................................................................................  Reviewed by Signature/Title    ...................................................              ..............................................  Date                                               Time          22EPIC Rev 08/18

## 2020-01-11 NOTE — DISCHARGE INSTRUCTIONS
Plan to use 1-2 tap water enemas at home to determine if there may just be some retained mucus in the rectum.  If for some reason you continue to have stool output from the rectum following the conclusion of these enemas or have additional concerns you certainly could make an appointment with colorectal surgery at the number attached.    If you are to develop fever, abdominal pain, or any new or worsening symptoms, return to the emergency department.    If you would like to be seen by the colorectal specialist please contact [facility] office at [phone] to make an appointment.

## 2020-02-02 DIAGNOSIS — R60.0 BILATERAL LOWER EXTREMITY EDEMA: ICD-10-CM

## 2020-02-03 NOTE — TELEPHONE ENCOUNTER
"Requested Prescriptions   Pending Prescriptions Disp Refills     furosemide (LASIX) 20 MG tablet [Pharmacy Med Name: FUROSEMIDE 20MG TABLETS]  Last Written Prescription Date:  2/4/2019  Last Fill Quantity: 90 tablet,  # refills: 3   Last office visit: 2/4/2019 Cassi    Future Office Visit:       90 tablet 3     Sig: TAKE 1 TABLET(20 MG) BY MOUTH DAILY       Diuretics (Including Combos) Protocol Failed - 2/2/2020  8:37 AM        Failed - Blood pressure under 140/90 in past 12 months     BP Readings from Last 3 Encounters:   01/11/20 (!) 142/89   04/21/19 134/72   02/04/19 104/72             Passed - Recent (12 mo) or future (30 days) visit within the authorizing provider's specialty     Patient has had an office visit with the authorizing provider or a provider within the authorizing providers department within the previous 12 mos or has a future within next 30 days. See \"Patient Info\" tab in inbasket, or \"Choose Columns\" in Meds & Orders section of the refill encounter.              Passed - Medication is active on med list        Passed - Patient is age 18 or older        Passed - No active pregancy on record        Passed - Normal serum creatinine on file in past 12 months     Recent Labs   Lab Test 01/11/20  1155   CR 0.60              Passed - Normal serum potassium on file in past 12 months     Recent Labs   Lab Test 01/11/20  1155   POTASSIUM 4.1                    Passed - Normal serum sodium on file in past 12 months     Recent Labs   Lab Test 01/11/20  1155                 Passed - No positive pregnancy test in past 12 months        "

## 2020-02-04 DIAGNOSIS — R60.0 BILATERAL LOWER EXTREMITY EDEMA: ICD-10-CM

## 2020-02-04 RX ORDER — FUROSEMIDE 20 MG
TABLET ORAL
Qty: 90 TABLET | OUTPATIENT
Start: 2020-02-04

## 2020-02-04 RX ORDER — FUROSEMIDE 20 MG
TABLET ORAL
Qty: 30 TABLET | Refills: 0 | Status: SHIPPED | OUTPATIENT
Start: 2020-02-04 | End: 2020-03-04

## 2020-02-04 NOTE — TELEPHONE ENCOUNTER
"Requested Prescriptions   Pending Prescriptions Disp Refills     furosemide (LASIX) 20 MG tablet [Pharmacy Med Name: FUROSEMIDE 20MG TABLETS]  This may be a duplicate refill request.   90 tablet      Sig: TAKE 1 TABLET(20 MG) BY MOUTH DAILY       Diuretics (Including Combos) Protocol Failed - 2/4/2020 11:30 AM        Failed - Blood pressure under 140/90 in past 12 months     BP Readings from Last 3 Encounters:   01/11/20 (!) 142/89   04/21/19 134/72   02/04/19 104/72                 Failed - Recent (12 mo) or future (30 days) visit within the authorizing provider's specialty     Patient has had an office visit with the authorizing provider or a provider within the authorizing providers department within the previous 12 mos or has a future within next 30 days. See \"Patient Info\" tab in inbasket, or \"Choose Columns\" in Meds & Orders section of the refill encounter.              Passed - Medication is active on med list        Passed - Patient is age 18 or older        Passed - No active pregancy on record        Passed - Normal serum creatinine on file in past 12 months     Recent Labs   Lab Test 01/11/20  1155   CR 0.60              Passed - Normal serum potassium on file in past 12 months     Recent Labs   Lab Test 01/11/20  1155   POTASSIUM 4.1                    Passed - Normal serum sodium on file in past 12 months     Recent Labs   Lab Test 01/11/20  1155                 Passed - No positive pregnancy test in past 12 months        "

## 2020-02-04 NOTE — TELEPHONE ENCOUNTER
Medication is being filled for 1 time refill only due to:  Patient needs to be seen because it has been more than one year since last visit.   KIM DixonN, RN  [facility]

## 2020-03-01 DIAGNOSIS — R60.0 BILATERAL LOWER EXTREMITY EDEMA: ICD-10-CM

## 2020-03-01 DIAGNOSIS — F32.0 MILD MAJOR DEPRESSION (H): ICD-10-CM

## 2020-03-01 DIAGNOSIS — J44.9 COPD, SEVERE (H): ICD-10-CM

## 2020-03-01 DIAGNOSIS — J43.1 PANLOBULAR EMPHYSEMA (H): ICD-10-CM

## 2020-03-02 ENCOUNTER — MEDICAL CORRESPONDENCE (OUTPATIENT)
Dept: HEALTH INFORMATION MANAGEMENT | Facility: CLINIC | Age: 70
End: 2020-03-02

## 2020-03-02 ENCOUNTER — HEALTH MAINTENANCE LETTER (OUTPATIENT)
Age: 70
End: 2020-03-02

## 2020-03-02 ENCOUNTER — TELEPHONE (OUTPATIENT)
Dept: FAMILY MEDICINE | Facility: CLINIC | Age: 70
End: 2020-03-02

## 2020-03-02 NOTE — TELEPHONE ENCOUNTER
"Please see note below.    Requested Prescriptions   Pending Prescriptions Disp Refills     venlafaxine (EFFEXOR-XR) 37.5 MG 24 hr capsule [Pharmacy Med Name: VENLAFAXINE ER 37.5MG CAPSULES]  Last Written Prescription Date:  11/5/2019  Last Fill Quantity: 90 capsule,  # refills: 0   Last office visit: 2/4/2019  [facility] Office Visit:       30 capsule      Sig: TAKE 1 CAPSULE(37.5 MG) BY MOUTH DAILY       Serotonin-Norepinephrine Reuptake Inhibitors  Failed - 3/2/2020  9:32 AM        Failed - Blood pressure under 140/90 in past 12 months     BP Readings from Last 3 Encounters:   01/11/20 (!) 142/89   04/21/19 134/72   02/04/19 104/72             Failed - Recent (6 mo) or future (30 days) visit within the authorizing provider's specialty     Patient had office visit in the last 6 months or has a visit in the next 30 days with authorizing provider or within the authorizing provider's specialty.  See \"Patient Info\" tab in inbasket, or \"Choose Columns\" in Meds & Orders section of the refill encounter.            Passed - PHQ-9 score of less than 5 in past 6 months     Please review last PHQ-9 score.           Passed - Medication is active on med list        Passed - Patient is age 18 or older        Passed - No active pregnancy on record        Passed - Normal serum creatinine on file in past 12 months     Recent Labs   Lab Test 01/11/20  1155   CR 0.60             Passed - No positive pregnancy test in past 12 months                furosemide (LASIX) 20 MG tablet [Pharmacy Med Name: FUROSEMIDE 20MG TABLETS]  Last Written Prescription Date:  2/4/2020  Last Fill Quantity: 30 tablet,  # refills: 0   Last office visit: 2/4/2019  [facility] Office Visit:       30 tablet 0     Sig: TAKE 1 TABLET(20 MG) BY MOUTH DAILY       Diuretics (Including Combos) Protocol Failed - 3/2/2020  9:32 AM        Failed - Blood pressure under 140/90 in past 12 months     BP Readings from Last 3 Encounters:   01/11/20 (!) 142/89 " "  04/21/19 134/72   02/04/19 104/72             Failed - Recent (12 mo) or future (30 days) visit within the authorizing provider's specialty     Patient has had an office visit with the authorizing provider or a provider within the authorizing providers department within the previous 12 mos or has a future within next 30 days. See \"Patient Info\" tab in inbasket, or \"Choose Columns\" in Meds & Orders section of the refill encounter.              Passed - Medication is active on med list        Passed - Patient is age 18 or older        Passed - No active pregancy on record        Passed - Normal serum creatinine on file in past 12 months     Recent Labs   Lab Test 01/11/20  1155   CR 0.60              Passed - Normal serum potassium on file in past 12 months     Recent Labs   Lab Test 01/11/20  1155   POTASSIUM 4.1              Passed - Normal serum sodium on file in past 12 months     Recent Labs   Lab Test 01/11/20  1155                 Passed - No positive pregnancy test in past 12 months                  COMBIVENT RESPIMAT  MCG/ACT inhaler [Pharmacy Med Name: COMBIVENT RESPIMAT ORAL 120SPRAY 4G]  Last Written Prescription Date:  11/5/2019  Last Fill Quantity: 4 g,  # refills: 0   Last office visit: 2/4/2019  Cassi    Future Office Visit:       4 g 0     Sig: INHALE 1 PUFF INTO THE LUNGS FOUR TIMES DAILY AS NEEDED FOR SHORTNESS OF BREATH OR DIFFICULT BREATHING OR WHEEZING       Asthma Maintenance Inhalers - Anticholinergics Failed - 3/2/2020  9:32 AM        Failed - Asthma control assessment score within normal limits in last 6 months     Please review ACT score.     No flowsheet data found.            Failed - Recent (6 mo) or future (30 days) visit within the authorizing provider's specialty     Patient had office visit in the last 6 months or has a visit in the next 30 days with authorizing provider or within the authorizing provider's specialty.  See \"Patient Info\" tab in inbasket, or \"Choose " "Columns\" in Meds & Orders section of the refill encounter.            Passed - Patient is age 12 years or older        Passed - Medication is active on med list                  INCRUSE ELLIPTA 62.5 MCG/INH Inhaler [Pharmacy Med Name: INCRUSE ELLIPTA 62.5MCG ORAL INH 30]  Last Written Prescription Date:  11/5/2019  Last Fill Quantity: 1 Inhaler,  # refills: 0   Last office visit: 2/4/2019  Cassi    Future Office Visit:             Sig: INHALE 1 PUFF INTO THE LUNGS DAILY       Asthma Maintenance Inhalers - Anticholinergics Failed - 3/2/2020  9:32 AM        Failed - Asthma control assessment score within normal limits in last 6 months     Please review ACT score.     No flowsheet data found.          Failed - Recent (6 mo) or future (30 days) visit within the authorizing provider's specialty     Patient had office visit in the last 6 months or has a visit in the next 30 days with authorizing provider or within the authorizing provider's specialty.  See \"Patient Info\" tab in inbasket, or \"Choose Columns\" in Meds & Orders section of the refill encounter.            Passed - Patient is age 12 years or older        Passed - Medication is active on med list        "

## 2020-03-02 NOTE — TELEPHONE ENCOUNTER
Patient calling, states that she is aware she's due for a annual check up but would like to postpone it until the summer. Patient states that she has a hx of COPD and does not want to go to the clinic during flu season and that it is normally very difficult for her to get out of her apartment. Patient would like to know what other options she has whether it's a telephone visit or filling out questionnaires via [facility].     Ph: [phone]    Gill EDMONDSON  Patient Representative - [place]

## 2020-03-02 NOTE — TELEPHONE ENCOUNTER
Date Forms was received: March 2, 2020    Forms received by: Fax    Purpose of Form:  [employer]    When the form is due:  ASAP    How the form needs to be returned for patient:  Fax    Form currently placed  LH inbox

## 2020-03-04 ENCOUNTER — MYC REFILL (OUTPATIENT)
Dept: FAMILY MEDICINE | Facility: CLINIC | Age: 70
End: 2020-03-04

## 2020-03-04 DIAGNOSIS — J44.9 COPD, SEVERE (H): ICD-10-CM

## 2020-03-04 DIAGNOSIS — F32.0 MILD MAJOR DEPRESSION (H): ICD-10-CM

## 2020-03-04 DIAGNOSIS — R60.0 BILATERAL LOWER EXTREMITY EDEMA: ICD-10-CM

## 2020-03-04 DIAGNOSIS — J43.1 PANLOBULAR EMPHYSEMA (H): ICD-10-CM

## 2020-03-04 RX ORDER — FUROSEMIDE 20 MG
TABLET ORAL
Qty: 90 TABLET | Refills: 0 | Status: SHIPPED | OUTPATIENT
Start: 2020-03-04 | End: 2020-05-29

## 2020-03-04 RX ORDER — IPRATROPIUM BROMIDE AND ALBUTEROL 20; 100 UG/1; UG/1
SPRAY, METERED RESPIRATORY (INHALATION)
Qty: 4 G | Refills: 0 | Status: SHIPPED | OUTPATIENT
Start: 2020-03-04

## 2020-03-04 RX ORDER — VENLAFAXINE HYDROCHLORIDE 37.5 MG/1
CAPSULE, EXTENDED RELEASE ORAL
Qty: 90 CAPSULE | Refills: 0 | Status: SHIPPED | OUTPATIENT
Start: 2020-03-04 | End: 2020-05-29

## 2020-03-04 RX ORDER — FUROSEMIDE 20 MG
20 TABLET ORAL DAILY
Qty: 30 TABLET | Refills: 0 | Status: CANCELLED | OUTPATIENT
Start: 2020-03-04

## 2020-03-04 RX ORDER — VENLAFAXINE HYDROCHLORIDE 37.5 MG/1
37.5 CAPSULE, EXTENDED RELEASE ORAL DAILY
Qty: 90 CAPSULE | Refills: 0 | Status: CANCELLED | OUTPATIENT
Start: 2020-03-04

## 2020-03-04 NOTE — TELEPHONE ENCOUNTER
Routing refill request to provider for review/approval because:  Patient needs to be seen because it has been more than 1 year since last office visit.    Please see below note at bottom of message chain, patient does not want to come in for appointment until summer. She is active on [employer]. Vicky, please review for refills/follow up. Thank you,  Marilyn Benavidez R.N.

## 2020-03-04 NOTE — TELEPHONE ENCOUNTER
Ok to extend her scripts for 3 months so she can come in during the summer. Please notify that will need to be seen prior to further refills though. This way we can keep her on annual track of summer time.   Electronically Signed By: Vicky Ye PA-C

## 2020-03-05 DIAGNOSIS — J43.1 PANLOBULAR EMPHYSEMA (H): ICD-10-CM

## 2020-03-05 DIAGNOSIS — R60.0 BILATERAL LOWER EXTREMITY EDEMA: ICD-10-CM

## 2020-03-05 DIAGNOSIS — J44.9 COPD, SEVERE (H): ICD-10-CM

## 2020-03-05 DIAGNOSIS — F32.0 MILD MAJOR DEPRESSION (H): ICD-10-CM

## 2020-03-05 RX ORDER — FUROSEMIDE 20 MG
20 TABLET ORAL DAILY
Qty: 90 TABLET | Refills: 0 | OUTPATIENT
Start: 2020-03-05

## 2020-03-05 RX ORDER — VENLAFAXINE HYDROCHLORIDE 37.5 MG/1
37.5 CAPSULE, EXTENDED RELEASE ORAL DAILY
Qty: 90 CAPSULE | Refills: 0 | OUTPATIENT
Start: 2020-03-05

## 2020-03-05 NOTE — TELEPHONE ENCOUNTER
"Requested Prescriptions   Pending Prescriptions Disp Refills     fluticasone-salmeterol (ADVAIR DISKUS) 250-50 MCG/DOSE inhaler 1 Inhaler 0     Sig: Inhale 1 puff into the lungs 2 times daily   Last Written Prescription Date:  11/5/2019  Last Fill Quantity: 1,  # refills: 0   Last office visit: No previous visit found with prescribing provider:  2/4/2019   Future Office Visit:      Inhaled Steroids Protocol Failed - 3/5/2020  8:48 AM        Failed - Asthma control assessment score within normal limits in last 6 months     Please review ACT score.           Failed - Recent (6 mo) or future (30 days) visit within the authorizing provider's specialty     Patient had office visit in the last 6 months or has a visit in the next 30 days with authorizing provider or within the authorizing provider's specialty.  See \"Patient Info\" tab in inbasket, or \"Choose Columns\" in Meds & Orders section of the refill encounter.            Passed - Patient is age 12 or older        Passed - Medication is active on med list        A 90 day supply is given, patient is due for an office visit.  Please call to  assist the patient in scheduling an appointment.  KIM KempN, RN  [employer] [facility]  "

## 2020-04-27 ENCOUNTER — TRANSFERRED RECORDS (OUTPATIENT)
Dept: HEALTH INFORMATION MANAGEMENT | Facility: CLINIC | Age: 70
End: 2020-04-27

## 2020-05-29 DIAGNOSIS — J43.1 PANLOBULAR EMPHYSEMA (H): ICD-10-CM

## 2020-05-29 DIAGNOSIS — J44.9 COPD, SEVERE (H): ICD-10-CM

## 2020-05-29 DIAGNOSIS — R60.0 BILATERAL LOWER EXTREMITY EDEMA: ICD-10-CM

## 2020-05-29 DIAGNOSIS — F32.0 MILD MAJOR DEPRESSION (H): ICD-10-CM

## 2020-05-29 RX ORDER — VENLAFAXINE HYDROCHLORIDE 37.5 MG/1
CAPSULE, EXTENDED RELEASE ORAL
Qty: 90 CAPSULE | Refills: 0 | Status: SHIPPED | OUTPATIENT
Start: 2020-05-29 | End: 2020-08-31

## 2020-05-29 RX ORDER — FUROSEMIDE 20 MG
TABLET ORAL
Qty: 90 TABLET | Refills: 0 | Status: SHIPPED | OUTPATIENT
Start: 2020-05-29 | End: 2020-08-31

## 2020-05-29 NOTE — TELEPHONE ENCOUNTER
Chart reviewed.  [employer] message sent to patient regarding need for appt.  Refills completed.  - Simeon, CNP

## 2020-05-29 NOTE — TELEPHONE ENCOUNTER
Routing refill request to provider for review/approval because:  Labs out of range:  BP,   Not current ACT, PHQ 9  Patient needs to be seen because it has been more than 1 year since last office visit. Needs follow up. Did not complete last appointment.     Marilyn Benavidez R.N.

## 2020-06-12 ENCOUNTER — VIRTUAL VISIT (OUTPATIENT)
Dept: FAMILY MEDICINE | Facility: CLINIC | Age: 70
End: 2020-06-12
Payer: MEDICARE

## 2020-06-12 DIAGNOSIS — J96.11 CHRONIC RESPIRATORY FAILURE WITH HYPOXIA, ON HOME OXYGEN THERAPY (H): ICD-10-CM

## 2020-06-12 DIAGNOSIS — F33.42 RECURRENT MAJOR DEPRESSION IN COMPLETE REMISSION (H): Primary | ICD-10-CM

## 2020-06-12 DIAGNOSIS — Z99.81 CHRONIC RESPIRATORY FAILURE WITH HYPOXIA, ON HOME OXYGEN THERAPY (H): ICD-10-CM

## 2020-06-12 DIAGNOSIS — I87.2 CHRONIC VENOUS INSUFFICIENCY: ICD-10-CM

## 2020-06-12 DIAGNOSIS — J43.1 PANLOBULAR EMPHYSEMA (H): ICD-10-CM

## 2020-06-12 DIAGNOSIS — J44.9 COPD, SEVERE (H): ICD-10-CM

## 2020-06-12 PROCEDURE — 99443 ZZC PHYSICIAN TELEPHONE EVALUATION 21-30 MIN: CPT | Performed by: PHYSICIAN ASSISTANT

## 2020-06-12 ASSESSMENT — ANXIETY QUESTIONNAIRES
2. NOT BEING ABLE TO STOP OR CONTROL WORRYING: NOT AT ALL
6. BECOMING EASILY ANNOYED OR IRRITABLE: NOT AT ALL
GAD7 TOTAL SCORE: 0
5. BEING SO RESTLESS THAT IT IS HARD TO SIT STILL: NOT AT ALL
IF YOU CHECKED OFF ANY PROBLEMS ON THIS QUESTIONNAIRE, HOW DIFFICULT HAVE THESE PROBLEMS MADE IT FOR YOU TO DO YOUR WORK, TAKE CARE OF THINGS AT HOME, OR GET ALONG WITH OTHER PEOPLE: NOT DIFFICULT AT ALL
7. FEELING AFRAID AS IF SOMETHING AWFUL MIGHT HAPPEN: NOT AT ALL
3. WORRYING TOO MUCH ABOUT DIFFERENT THINGS: NOT AT ALL
1. FEELING NERVOUS, ANXIOUS, OR ON EDGE: NOT AT ALL

## 2020-06-12 ASSESSMENT — PATIENT HEALTH QUESTIONNAIRE - PHQ9
5. POOR APPETITE OR OVEREATING: NOT AT ALL
SUM OF ALL RESPONSES TO PHQ QUESTIONS 1-9: 0

## 2020-06-12 NOTE — PATIENT INSTRUCTIONS
It was nice to catch-up with you :)  Congrats on your weight loss and healthy eating habits!  Glad things are going well with your current medications so let's continue without changes.  Consider repeating labs at end of the summer, but since I know it's hard for you to get out due to your severe COPD/requiring oxygen then I will keep you in mind for our new primary care transformation as we may be able to come to you.   Future labs were placed just in case this program is not up and running yet.  Please continue to see your pulmonologist as planned.

## 2020-06-12 NOTE — PROGRESS NOTES
"Carmen Perez is a 70 year old female who is being evaluated via a billable telephone visit.      The patient has been notified of following:     \"This telephone visit will be conducted via a call between you and your physician/provider. We have found that certain health care needs can be provided without the need for a physical exam.  This service lets us provide the care you need with a short phone conversation.  If a prescription is necessary we can send it directly to your pharmacy.  If lab work is needed we can place an order for that and you can then stop by our lab to have the test done at a later time.    Telephone visits are billed at different rates depending on your insurance coverage. During this emergency period, for some insurers they may be billed the same as an in-person visit.  Please reach out to your insurance provider with any questions.    If during the course of the call the physician/provider feels a telephone visit is not appropriate, you will not be charged for this service.\"    Patient has given verbal consent for Telephone visit?  Yes    What phone number would you like to be contacted at? [phone]    How would you like to obtain your AVS? Mail a copy    Subjective     Carmen Perez is a 70 year old female who presents via phone visit today for the following health issues:    Completed via telephone today due to COVID19    HPI  Depression Followup - doing really well - happy to report that she has purposefully lost weight through eating more healthily. Eating more soups, salad from her son's restaurant. Was 180 lbs and now down to 153lbs! Is very pleased about this. Had taken her at least 1 year to do.  Still struggles to get out of her home due to problems with breathing - had f/u with her pulmonologist in April for severe COPD. On continuous O2 therapy at 3lpm. Doesn't do much for activity due to her breathing problems. Maximized on inhaler therapy per pulmonology. Son will still bring " "her to his home for family dinners.  Had previously been on wellbutrin, but states that since switching to effexor that \"this was really the right thing for me\" and feels \"I don't have any darkness and I'm at total peace.\"  Talks with sister and brother living in [place] and they \"encourage each other.\"  Already has refill from  for effexor.      How are you doing with your depression since your last visit? Doing really well - very stable    Are you having other symptoms that might be associated with depression? No    Have you had a significant life event?  No     Are you feeling anxious or having panic attacks?   No    Do you have any concerns with your use of alcohol or other drugs? No    Social History     Tobacco Use     Smoking status: Former Smoker     Packs/day: 0.25     Types: Cigarettes     Last attempt to quit: 2012     Years since quittin.0     Smokeless tobacco: Never Used   Substance Use Topics     Alcohol use: No     Alcohol/week: 0.0 standard drinks     Drug use: No     PHQ 3/4/2019 2019 2020   PHQ-9 Total Score 0 0 0   Q9: Thoughts of better off dead/self-harm past 2 weeks Not at all Not at all Not at all     THOMPSON-7 SCORE 2019 3/4/2019 2020   Total Score 0 1 0     Last PHQ-9 2020   1.  Little interest or pleasure in doing things 0   2.  Feeling down, depressed, or hopeless 0   3.  Trouble falling or staying asleep, or sleeping too much 0   4.  Feeling tired or having little energy 0   5.  Poor appetite or overeating 0   6.  Feeling bad about yourself 0   7.  Trouble concentrating 0   8.  Moving slowly or restless 0   Q9: Thoughts of better off dead/self-harm past 2 weeks 0   PHQ-9 Total Score 0   Difficulty at work, home, or with people Not difficult at all     THOMPSON-7  2020   1. Feeling nervous, anxious, or on edge 0   2. Not being able to stop or control worrying 0   3. Worrying too much about different things 0   4. Trouble relaxing 0   5. Being so restless that " it is hard to sit still 0   6. Becoming easily annoyed or irritable 0   7. Feeling afraid, as if something awful might happen 0   THOMPSON-7 Total Score 0   If you checked any problems, how difficult have they made it for you to do your work, take care of things at home, or get along with other people? Not difficult at all       Suicide Assessment Five-step Evaluation and Treatment (SAFE-T)      How many servings of fruits and vegetables do you eat daily?  2-3    On average, how many sweetened beverages do you drink each day (Examples: soda, juice, sweet tea, etc.  Do NOT count diet or artificially sweetened beverages)?   0    How many days per week do you exercise enough to make your heart beat faster?     How many minutes a day do you exercise enough to make your heart beat faster?     How many days per week do you miss taking your medication? 0      Medication Followup of lasix; evaluated by cardiology in 2017 and felt that she had no R-sided heart failure, but could stay on low-dose of lasix for some chronic venous insufficiency.  Pt has taken lasix for many years and would like to continue as finds this helpful for her.    Taking Medication as prescribed: yes    Side Effects:  None    Medication Helping Symptoms:  yes     Declines any health maintenance items including mammogram, colonoscopy, and DEXA scan.    Patient Active Problem List   Diagnosis     COPD, severe (H)     CARDIOVASCULAR SCREENING; LDL GOAL LESS THAN 160     Cor pulmonale (H)     (HFpEF) heart failure with preserved ejection fraction (H)     S/P colostomy (H) - since colonic perforation in 5/2016     Recurrent major depression in complete remission (H)     Diverticulitis of large intestine with perforation, unspecified bleeding status - 5/2016 requiring emergency colostomy placement.     History of cervical fracture - C2,C7 while in hospital following emergency surgery for perforated diverticula. Cleared by neurosurgery 8/2016. No chronic issues  since.     Former smoker - 40 pack yr history     Panlobular emphysema (H)     Pulmonary HTN - probably secondary to COPD per cardiology     Dyspnea, unspecified type     Chronic venous insufficiency     Chronic respiratory failure with hypoxia, on home oxygen therapy (H)     Past Surgical History:   Procedure Laterality Date      SECTION       HERNIORRHAPHY INCISIONAL (LOCATION) N/A 2016    Procedure: HERNIORRHAPHY INCISIONAL (LOCATION);  Surgeon: Bronson Ornelas MD;  Location: [facility] OR     LAPAROSCOPIC ASSISTED COLECTOMY N/A 2016    Procedure: LAPAROSCOPIC ASSISTED COLECTOMY;  Surgeon: Bronson Ornelas MD;  Location: [facility]     LAPAROSCOPIC ASSISTED COLOSTOMY N/A 2016    Procedure: LAPAROSCOPIC ASSISTED COLOSTOMY;  Surgeon: Bronson Ornelas MD;  Location: [facility] OR     LAPAROSCOPIC TUBAL LIGATION         Social History     Tobacco Use     Smoking status: Former Smoker     Packs/day: 0.25     Types: Cigarettes     Last attempt to quit: 2012     Years since quittin.0     Smokeless tobacco: Never Used   Substance Use Topics     Alcohol use: No     Alcohol/week: 0.0 standard drinks     Family History   Problem Relation Age of Onset     C.A.D. Mother          in her 60's         Current Outpatient Medications   Medication Sig Dispense Refill     acetaminophen (TYLENOL) 325 MG tablet Take 2 tablets (650 mg) by mouth every 6 hours as needed for mild pain 100 tablet 0     calcium carb-cholecalciferol 600-500 MG-UNIT CAPS        COMBIVENT RESPIMAT  MCG/ACT inhaler INHALE 1 PUFF INTO THE LUNGS FOUR TIMES DAILY AS NEEDED FOR SHORTNESS OF BREATH OR DIFFICULT BREATHING OR WHEEZING 4 g 0     fluticasone-salmeterol (ADVAIR DISKUS) 250-50 MCG/DOSE inhaler Inhale 1 puff into the lungs 2 times daily 1 Inhaler 1     furosemide (LASIX) 20 MG tablet TAKE 1 TABLET(20 MG) BY MOUTH DAILY 90 tablet 0     INCRUSE ELLIPTA 62.5 MCG/INH Inhaler INHALE 1 PUFF INTO THE LUNGS DAILY 1 Inhaler 1      venlafaxine (EFFEXOR-XR) 37.5 MG 24 hr capsule TAKE 1 CAPSULE(37.5 MG) BY MOUTH DAILY 90 capsule 0     No Known Allergies    Reviewed and updated as needed this visit by Provider  Tobacco  Allergies  Meds  Problems  Med Hx  Surg Hx  Fam Hx  Soc Hx          Review of Systems   Constitutional, HEENT, cardiovascular, pulmonary, gi and gu, psych systems are negative, except as otherwise noted.       Objective   Reported vitals:  There were no vitals taken for this visit.   alert and no distress  PSYCH: Alert and oriented times 3; coherent speech, normal   rate and volume, able to articulate logical thoughts, able   to abstract reason, no tangential thoughts, no hallucinations   or delusions  Her affect is normal and pleasant  RESP: No cough, no audible wheezing, able to talk in full sentences  Remainder of exam unable to be completed due to telephone visits    Diagnostic Test Results:  Labs reviewed in Epic        Assessment/Plan:    ICD-10-CM    1. Recurrent major depression in complete remission (H)  F33.42    2. Chronic venous insufficiency  I87.2    3. COPD, severe (H)  J44.9    4. Panlobular emphysema (H)  J43.1    5. Chronic respiratory failure with hypoxia, on home oxygen therapy (H)  J96.11     Z99.81    Mood very stable on effexor.  Chronic venous insufficiency stable on lasix. No heart R-sided heart failure per cardiology consult 2017.   Pt feels meds works well for her and will continue without changes. Given temp refills while I was out on maternity leave. Can extend when they're due again.  We did encourage her to have a metabolic panel done to monitor electrolytes, but thankfully this was completed this year already and stable. She does not drive or leave her home unless son comes to get her due to severe COPD and hypoxia requiring home O2 therapy. Thus, she reports she would like to avoid ever coming in during the winter months. We'll see if she may be a candidate for additional services with  new primary care transformation model moving forward.  See pt instruction.  Patient Instructions   It was nice to catch-up with you :)  Congrats on your weight loss and healthy eating habits!  Glad things are going well with your current medications so let's continue without changes.  Consider repeating labs at end of the summer, but since I know it's hard for you to get out due to your severe COPD/requiring oxygen then I will keep you in mind for our new primary care transformation as we may be able to come to you.   Future labs were placed just in case this program is not up and running yet.  Please continue to see your pulmonologist as planned.    Return in about 2 months (around 8/12/2020) for Lab Work.      Phone call duration:  28 minutes    Vicky Ye PA-C

## 2020-06-13 ASSESSMENT — ANXIETY QUESTIONNAIRES: GAD7 TOTAL SCORE: 0

## 2020-08-31 DIAGNOSIS — F32.0 MILD MAJOR DEPRESSION (H): ICD-10-CM

## 2020-08-31 DIAGNOSIS — R60.0 BILATERAL LOWER EXTREMITY EDEMA: ICD-10-CM

## 2020-08-31 RX ORDER — VENLAFAXINE HYDROCHLORIDE 37.5 MG/1
CAPSULE, EXTENDED RELEASE ORAL
Qty: 90 CAPSULE | Refills: 0 | Status: SHIPPED | OUTPATIENT
Start: 2020-08-31 | End: 2020-11-27

## 2020-08-31 RX ORDER — FUROSEMIDE 20 MG
TABLET ORAL
Qty: 90 TABLET | Refills: 0 | Status: SHIPPED | OUTPATIENT
Start: 2020-08-31 | End: 2021-01-01

## 2020-08-31 NOTE — TELEPHONE ENCOUNTER
Routing refill request to provider for review/approval because:  Failed BP protocol    Kenna OGRMAN RN  EP Triage

## 2020-11-27 DIAGNOSIS — F32.0 MILD MAJOR DEPRESSION (H): ICD-10-CM

## 2020-11-27 NOTE — TELEPHONE ENCOUNTER
Routing refill request to provider for review/approval because:  Failed BP protocol    Kenna GORMAN RN  EP Triage

## 2020-11-28 RX ORDER — VENLAFAXINE HYDROCHLORIDE 37.5 MG/1
37.5 CAPSULE, EXTENDED RELEASE ORAL DAILY
Qty: 90 CAPSULE | Refills: 0 | Status: SHIPPED | OUTPATIENT
Start: 2020-11-28 | End: 2021-01-01

## 2020-11-28 NOTE — TELEPHONE ENCOUNTER
Temporary re-fill, pt is due for OV for routine health physical, vitals and lab recheck. Please notify and assist with scheduling if needed.  Electronically Signed By: Vicky Rodríguez PA-C

## 2020-11-30 ENCOUNTER — MYC MEDICAL ADVICE (OUTPATIENT)
Dept: FAMILY MEDICINE | Facility: CLINIC | Age: 70
End: 2020-11-30

## 2020-12-14 ENCOUNTER — HEALTH MAINTENANCE LETTER (OUTPATIENT)
Age: 70
End: 2020-12-14

## 2021-01-01 ENCOUNTER — APPOINTMENT (OUTPATIENT)
Dept: PHYSICAL THERAPY | Facility: CLINIC | Age: 71
DRG: 189 | End: 2021-01-01
Payer: MEDICARE

## 2021-01-01 ENCOUNTER — TRANSITIONAL CARE UNIT VISIT (OUTPATIENT)
Dept: GERIATRICS | Facility: CLINIC | Age: 71
End: 2021-01-01
Payer: MEDICARE

## 2021-01-01 ENCOUNTER — APPOINTMENT (OUTPATIENT)
Dept: CT IMAGING | Facility: CLINIC | Age: 71
DRG: 189 | End: 2021-01-01
Attending: INTERNAL MEDICINE
Payer: MEDICARE

## 2021-01-01 ENCOUNTER — LAB REQUISITION (OUTPATIENT)
Dept: LAB | Facility: CLINIC | Age: 71
End: 2021-01-01

## 2021-01-01 ENCOUNTER — APPOINTMENT (OUTPATIENT)
Dept: GENERAL RADIOLOGY | Facility: CLINIC | Age: 71
DRG: 987 | End: 2021-01-01
Attending: EMERGENCY MEDICINE
Payer: MEDICARE

## 2021-01-01 ENCOUNTER — TELEPHONE (OUTPATIENT)
Dept: FAMILY MEDICINE | Facility: CLINIC | Age: 71
End: 2021-01-01

## 2021-01-01 ENCOUNTER — HOSPITAL ENCOUNTER (INPATIENT)
Facility: CLINIC | Age: 71
LOS: 7 days | DRG: 189 | End: 2022-01-04
Attending: EMERGENCY MEDICINE | Admitting: INTERNAL MEDICINE
Payer: MEDICARE

## 2021-01-01 ENCOUNTER — APPOINTMENT (OUTPATIENT)
Dept: PHYSICAL THERAPY | Facility: CLINIC | Age: 71
DRG: 987 | End: 2021-01-01
Payer: MEDICARE

## 2021-01-01 ENCOUNTER — DOCUMENTATION ONLY (OUTPATIENT)
Dept: FAMILY MEDICINE | Facility: CLINIC | Age: 71
End: 2021-01-01

## 2021-01-01 ENCOUNTER — APPOINTMENT (OUTPATIENT)
Dept: GENERAL RADIOLOGY | Facility: CLINIC | Age: 71
DRG: 189 | End: 2021-01-01
Attending: EMERGENCY MEDICINE
Payer: MEDICARE

## 2021-01-01 ENCOUNTER — APPOINTMENT (OUTPATIENT)
Dept: CARDIOLOGY | Facility: CLINIC | Age: 71
DRG: 987 | End: 2021-01-01
Attending: INTERNAL MEDICINE
Payer: MEDICARE

## 2021-01-01 ENCOUNTER — VIRTUAL VISIT (OUTPATIENT)
Dept: FAMILY MEDICINE | Facility: CLINIC | Age: 71
End: 2021-01-01
Payer: MEDICARE

## 2021-01-01 ENCOUNTER — TELEPHONE (OUTPATIENT)
Dept: CARE COORDINATION | Facility: CLINIC | Age: 71
End: 2021-01-01

## 2021-01-01 ENCOUNTER — MEDICAL CORRESPONDENCE (OUTPATIENT)
Dept: HEALTH INFORMATION MANAGEMENT | Facility: CLINIC | Age: 71
End: 2021-01-01

## 2021-01-01 ENCOUNTER — APPOINTMENT (OUTPATIENT)
Dept: PHYSICAL THERAPY | Facility: CLINIC | Age: 71
DRG: 987 | End: 2021-01-01
Attending: INTERNAL MEDICINE
Payer: MEDICARE

## 2021-01-01 ENCOUNTER — APPOINTMENT (OUTPATIENT)
Dept: OCCUPATIONAL THERAPY | Facility: CLINIC | Age: 71
DRG: 987 | End: 2021-01-01
Attending: INTERNAL MEDICINE
Payer: MEDICARE

## 2021-01-01 ENCOUNTER — MYC MEDICAL ADVICE (OUTPATIENT)
Dept: FAMILY MEDICINE | Facility: CLINIC | Age: 71
End: 2021-01-01

## 2021-01-01 ENCOUNTER — APPOINTMENT (OUTPATIENT)
Dept: CT IMAGING | Facility: CLINIC | Age: 71
DRG: 987 | End: 2021-01-01
Attending: EMERGENCY MEDICINE
Payer: MEDICARE

## 2021-01-01 ENCOUNTER — APPOINTMENT (OUTPATIENT)
Dept: PHYSICAL THERAPY | Facility: CLINIC | Age: 71
DRG: 189 | End: 2021-01-01
Attending: INTERNAL MEDICINE
Payer: MEDICARE

## 2021-01-01 ENCOUNTER — PATIENT OUTREACH (OUTPATIENT)
Dept: CARE COORDINATION | Facility: CLINIC | Age: 71
End: 2021-01-01
Payer: MEDICARE

## 2021-01-01 ENCOUNTER — HOSPITAL ENCOUNTER (INPATIENT)
Facility: CLINIC | Age: 71
LOS: 3 days | Discharge: NURSING, REHAB OR LONG-TERM CARE | DRG: 987 | End: 2021-12-19
Attending: EMERGENCY MEDICINE | Admitting: INTERNAL MEDICINE
Payer: MEDICARE

## 2021-01-01 ENCOUNTER — HEALTH MAINTENANCE LETTER (OUTPATIENT)
Age: 71
End: 2021-01-01

## 2021-01-01 VITALS
WEIGHT: 122 LBS | BODY MASS INDEX: 21.61 KG/M2 | SYSTOLIC BLOOD PRESSURE: 122 MMHG | TEMPERATURE: 96.8 F | DIASTOLIC BLOOD PRESSURE: 61 MMHG | RESPIRATION RATE: 16 BRPM | OXYGEN SATURATION: 96 % | HEART RATE: 75 BPM

## 2021-01-01 VITALS
HEIGHT: 63 IN | OXYGEN SATURATION: 96 % | DIASTOLIC BLOOD PRESSURE: 69 MMHG | HEART RATE: 84 BPM | WEIGHT: 122 LBS | TEMPERATURE: 96.4 F | RESPIRATION RATE: 22 BRPM | BODY MASS INDEX: 21.62 KG/M2 | SYSTOLIC BLOOD PRESSURE: 112 MMHG

## 2021-01-01 VITALS — SYSTOLIC BLOOD PRESSURE: 120 MMHG | DIASTOLIC BLOOD PRESSURE: 60 MMHG

## 2021-01-01 VITALS
HEART RATE: 102 BPM | OXYGEN SATURATION: 94 % | HEIGHT: 63 IN | DIASTOLIC BLOOD PRESSURE: 56 MMHG | SYSTOLIC BLOOD PRESSURE: 137 MMHG | TEMPERATURE: 96.5 F | BODY MASS INDEX: 21.26 KG/M2 | RESPIRATION RATE: 20 BRPM | WEIGHT: 120 LBS

## 2021-01-01 VITALS
DIASTOLIC BLOOD PRESSURE: 77 MMHG | TEMPERATURE: 96.4 F | WEIGHT: 122 LBS | SYSTOLIC BLOOD PRESSURE: 145 MMHG | BODY MASS INDEX: 21.61 KG/M2 | OXYGEN SATURATION: 96 % | RESPIRATION RATE: 20 BRPM | HEART RATE: 89 BPM

## 2021-01-01 VITALS
RESPIRATION RATE: 24 BRPM | WEIGHT: 121.9 LBS | HEART RATE: 96 BPM | SYSTOLIC BLOOD PRESSURE: 138 MMHG | TEMPERATURE: 96.8 F | BODY MASS INDEX: 21.59 KG/M2 | OXYGEN SATURATION: 97 % | DIASTOLIC BLOOD PRESSURE: 67 MMHG

## 2021-01-01 DIAGNOSIS — Z02.89 ENCOUNTER FOR COMPLETION OF FORM WITH PATIENT: Primary | ICD-10-CM

## 2021-01-01 DIAGNOSIS — J44.1 COPD EXACERBATION (H): ICD-10-CM

## 2021-01-01 DIAGNOSIS — Z00.01 ENCOUNTER FOR GENERAL ADULT MEDICAL EXAMINATION WITH ABNORMAL FINDINGS: ICD-10-CM

## 2021-01-01 DIAGNOSIS — J44.1 COPD EXACERBATION (H): Primary | ICD-10-CM

## 2021-01-01 DIAGNOSIS — J96.11 CHRONIC RESPIRATORY FAILURE WITH HYPOXIA, ON HOME OXYGEN THERAPY (H): Primary | ICD-10-CM

## 2021-01-01 DIAGNOSIS — R41.0 DISORIENTATION: ICD-10-CM

## 2021-01-01 DIAGNOSIS — F33.42 RECURRENT MAJOR DEPRESSION IN COMPLETE REMISSION (H): ICD-10-CM

## 2021-01-01 DIAGNOSIS — I50.32 CHRONIC HEART FAILURE WITH PRESERVED EJECTION FRACTION (H): ICD-10-CM

## 2021-01-01 DIAGNOSIS — F43.23 ADJUSTMENT DISORDER WITH MIXED ANXIETY AND DEPRESSED MOOD: Primary | ICD-10-CM

## 2021-01-01 DIAGNOSIS — J96.22 ACUTE AND CHRONIC RESPIRATORY FAILURE WITH HYPERCAPNIA (H): Primary | ICD-10-CM

## 2021-01-01 DIAGNOSIS — I27.20 PULMONARY HTN (H): ICD-10-CM

## 2021-01-01 DIAGNOSIS — F32.0 MILD MAJOR DEPRESSION (H): ICD-10-CM

## 2021-01-01 DIAGNOSIS — J96.22 ACUTE AND CHRONIC RESPIRATORY FAILURE WITH HYPERCAPNIA (H): ICD-10-CM

## 2021-01-01 DIAGNOSIS — S62.615B OPEN DISPLACED FRACTURE OF PROXIMAL PHALANX OF LEFT RING FINGER, INITIAL ENCOUNTER: ICD-10-CM

## 2021-01-01 DIAGNOSIS — Z13.220 LIPID SCREENING: ICD-10-CM

## 2021-01-01 DIAGNOSIS — Z74.09 IMPAIRED MOBILITY AND ADLS: ICD-10-CM

## 2021-01-01 DIAGNOSIS — Z93.3 S/P COLOSTOMY (H): ICD-10-CM

## 2021-01-01 DIAGNOSIS — Z78.9 IMPAIRED MOBILITY AND ADLS: ICD-10-CM

## 2021-01-01 DIAGNOSIS — J96.11 CHRONIC RESPIRATORY FAILURE WITH HYPOXIA, ON HOME OXYGEN THERAPY (H): ICD-10-CM

## 2021-01-01 DIAGNOSIS — Z99.81 CHRONIC RESPIRATORY FAILURE WITH HYPOXIA, ON HOME OXYGEN THERAPY (H): Primary | ICD-10-CM

## 2021-01-01 DIAGNOSIS — J44.1 CHRONIC OBSTRUCTIVE PULMONARY DISEASE WITH ACUTE EXACERBATION (H): ICD-10-CM

## 2021-01-01 DIAGNOSIS — I10 ESSENTIAL (PRIMARY) HYPERTENSION: ICD-10-CM

## 2021-01-01 DIAGNOSIS — Z53.9 DIAGNOSIS NOT YET DEFINED: Primary | ICD-10-CM

## 2021-01-01 DIAGNOSIS — J44.9 COPD, SEVERE (H): ICD-10-CM

## 2021-01-01 DIAGNOSIS — M62.81 GENERALIZED MUSCLE WEAKNESS: ICD-10-CM

## 2021-01-01 DIAGNOSIS — J44.9 CHRONIC OBSTRUCTIVE PULMONARY DISEASE, UNSPECIFIED COPD TYPE (H): ICD-10-CM

## 2021-01-01 DIAGNOSIS — J15.9 HOSPITAL-ACQUIRED BACTERIAL PNEUMONIA: ICD-10-CM

## 2021-01-01 DIAGNOSIS — Z71.89 OTHER SPECIFIED COUNSELING: ICD-10-CM
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
## 2021-01-01 DIAGNOSIS — R09.02 HYPOXIA: ICD-10-CM

## 2021-01-01 DIAGNOSIS — I47.10 PAROXYSMAL SUPRAVENTRICULAR TACHYCARDIA (H): ICD-10-CM

## 2021-01-01 DIAGNOSIS — F43.23 ADJUSTMENT DISORDER WITH MIXED ANXIETY AND DEPRESSED MOOD: ICD-10-CM

## 2021-01-01 DIAGNOSIS — K57.20 DIVERTICULITIS OF LARGE INTESTINE WITH PERFORATION, UNSPECIFIED BLEEDING STATUS: ICD-10-CM

## 2021-01-01 DIAGNOSIS — Z99.81 CHRONIC RESPIRATORY FAILURE WITH HYPOXIA, ON HOME OXYGEN THERAPY (H): ICD-10-CM

## 2021-01-01 DIAGNOSIS — I48.20 CHRONIC ATRIAL FIBRILLATION (H): ICD-10-CM

## 2021-01-01 DIAGNOSIS — I47.10 SVT (SUPRAVENTRICULAR TACHYCARDIA) (H): ICD-10-CM

## 2021-01-01 DIAGNOSIS — Z13.1 SCREENING FOR DIABETES MELLITUS: ICD-10-CM

## 2021-01-01 DIAGNOSIS — Z00.00 ENCOUNTER FOR MEDICARE ANNUAL WELLNESS EXAM: Primary | ICD-10-CM

## 2021-01-01 LAB
ALBUMIN SERPL-MCNC: 3.1 G/DL (ref 3.4–5)
ALBUMIN UR-MCNC: 10 MG/DL
ALP SERPL-CCNC: 19 U/L (ref 40–150)
ALT SERPL W P-5'-P-CCNC: 27 U/L (ref 0–50)
ANION GAP SERPL CALCULATED.3IONS-SCNC: 1 MMOL/L (ref 3–14)
ANION GAP SERPL CALCULATED.3IONS-SCNC: 2 MMOL/L (ref 3–14)
ANION GAP SERPL CALCULATED.3IONS-SCNC: 5 MMOL/L (ref 3–14)
ANION GAP SERPL CALCULATED.3IONS-SCNC: <1 MMOL/L (ref 3–14)
APPEARANCE UR: CLEAR
AST SERPL W P-5'-P-CCNC: 13 U/L (ref 0–45)
ATRIAL RATE - MUSE: 113 BPM
ATRIAL RATE - MUSE: 116 BPM
ATRIAL RATE - MUSE: 122 BPM
BASE EXCESS BLDA CALC-SCNC: 16.1 MMOL/L (ref -9–1.8)
BASE EXCESS BLDA CALC-SCNC: 17.8 MMOL/L (ref -9–1.8)
BASE EXCESS BLDA CALC-SCNC: 18.3 MMOL/L (ref -9–1.8)
BASE EXCESS BLDA CALC-SCNC: 18.3 MMOL/L (ref -9–1.8)
BASE EXCESS BLDV CALC-SCNC: 15 MMOL/L (ref -7.7–1.9)
BASE EXCESS BLDV CALC-SCNC: 15 MMOL/L (ref -7.7–1.9)
BASE EXCESS BLDV CALC-SCNC: 15.1 MMOL/L (ref -7.7–1.9)
BASE EXCESS BLDV CALC-SCNC: 15.4 MMOL/L (ref -7.7–1.9)
BASE EXCESS BLDV CALC-SCNC: 16.3 MMOL/L (ref -7.7–1.9)
BASE EXCESS BLDV CALC-SCNC: 16.5 MMOL/L (ref -7.7–1.9)
BASE EXCESS BLDV CALC-SCNC: 16.7 MMOL/L (ref -7.7–1.9)
BASE EXCESS BLDV CALC-SCNC: 16.9 MMOL/L (ref -7.7–1.9)
BASE EXCESS BLDV CALC-SCNC: 17 MMOL/L (ref -7.7–1.9)
BASOPHILS # BLD AUTO: 0 10E3/UL (ref 0–0.2)
BASOPHILS # BLD AUTO: 0 10E3/UL (ref 0–0.2)
BASOPHILS NFR BLD AUTO: 0 %
BASOPHILS NFR BLD AUTO: 0 %
BILIRUB SERPL-MCNC: 0.2 MG/DL (ref 0.2–1.3)
BILIRUB UR QL STRIP: NEGATIVE
BUN SERPL-MCNC: 13 MG/DL (ref 7–30)
BUN SERPL-MCNC: 17 MG/DL (ref 7–30)
BUN SERPL-MCNC: 18 MG/DL (ref 7–30)
BUN SERPL-MCNC: 27 MG/DL (ref 7–30)
BUN SERPL-MCNC: 27 MG/DL (ref 7–30)
BUN SERPL-MCNC: 31 MG/DL (ref 7–30)
BUN SERPL-MCNC: 37 MG/DL (ref 7–30)
BUN SERPL-MCNC: 38 MG/DL (ref 7–30)
CALCIUM SERPL-MCNC: 8.6 MG/DL (ref 8.5–10.1)
CALCIUM SERPL-MCNC: 8.9 MG/DL (ref 8.5–10.1)
CALCIUM SERPL-MCNC: 9 MG/DL (ref 8.5–10.1)
CALCIUM SERPL-MCNC: 9.1 MG/DL (ref 8.5–10.1)
CALCIUM SERPL-MCNC: 9.1 MG/DL (ref 8.5–10.1)
CALCIUM SERPL-MCNC: 9.3 MG/DL (ref 8.5–10.1)
CALCIUM SERPL-MCNC: 9.3 MG/DL (ref 8.5–10.1)
CALCIUM SERPL-MCNC: 9.5 MG/DL (ref 8.5–10.1)
CHLORIDE BLD-SCNC: 100 MMOL/L (ref 94–109)
CHLORIDE BLD-SCNC: 103 MMOL/L (ref 94–109)
CHLORIDE BLD-SCNC: 93 MMOL/L (ref 94–109)
CHLORIDE BLD-SCNC: 98 MMOL/L (ref 94–109)
CHLORIDE BLD-SCNC: 99 MMOL/L (ref 94–109)
CO2 SERPL-SCNC: 36 MMOL/L (ref 20–32)
CO2 SERPL-SCNC: 41 MMOL/L (ref 20–32)
CO2 SERPL-SCNC: 41 MMOL/L (ref 20–32)
CO2 SERPL-SCNC: 43 MMOL/L (ref 20–32)
CO2 SERPL-SCNC: 45 MMOL/L (ref 20–32)
CO2 SERPL-SCNC: >45 MMOL/L (ref 20–32)
COLOR UR AUTO: YELLOW
CREAT SERPL-MCNC: 0.52 MG/DL (ref 0.52–1.04)
CREAT SERPL-MCNC: 0.53 MG/DL (ref 0.52–1.04)
CREAT SERPL-MCNC: 0.55 MG/DL (ref 0.52–1.04)
CREAT SERPL-MCNC: 0.56 MG/DL (ref 0.52–1.04)
CREAT SERPL-MCNC: 0.56 MG/DL (ref 0.52–1.04)
CREAT SERPL-MCNC: 0.59 MG/DL (ref 0.52–1.04)
CREAT SERPL-MCNC: 0.6 MG/DL (ref 0.52–1.04)
CREAT SERPL-MCNC: 0.6 MG/DL (ref 0.52–1.04)
CREAT SERPL-MCNC: 0.69 MG/DL (ref 0.52–1.04)
DIASTOLIC BLOOD PRESSURE - MUSE: NORMAL MMHG
EOSINOPHIL # BLD AUTO: 0.2 10E3/UL (ref 0–0.7)
EOSINOPHIL # BLD AUTO: 0.3 10E3/UL (ref 0–0.7)
EOSINOPHIL NFR BLD AUTO: 1 %
EOSINOPHIL NFR BLD AUTO: 3 %
ERYTHROCYTE [DISTWIDTH] IN BLOOD BY AUTOMATED COUNT: 12.4 % (ref 10–15)
ERYTHROCYTE [DISTWIDTH] IN BLOOD BY AUTOMATED COUNT: 13 % (ref 10–15)
ERYTHROCYTE [DISTWIDTH] IN BLOOD BY AUTOMATED COUNT: 13 % (ref 10–15)
ERYTHROCYTE [DISTWIDTH] IN BLOOD BY AUTOMATED COUNT: 13.2 % (ref 10–15)
GAMMA INTERFERON BACKGROUND BLD IA-ACNC: 0 IU/ML
GAMMA INTERFERON BACKGROUND BLD IA-ACNC: 0 IU/ML
GFR SERPL CREATININE-BSD FRML MDRD: >90 ML/MIN/1.73M2
GLUCOSE BLD-MCNC: 111 MG/DL (ref 70–99)
GLUCOSE BLD-MCNC: 112 MG/DL (ref 70–99)
GLUCOSE BLD-MCNC: 118 MG/DL (ref 70–99)
GLUCOSE BLD-MCNC: 130 MG/DL (ref 70–99)
GLUCOSE BLD-MCNC: 143 MG/DL (ref 70–99)
GLUCOSE BLD-MCNC: 76 MG/DL (ref 70–99)
GLUCOSE BLD-MCNC: 97 MG/DL (ref 70–99)
GLUCOSE BLD-MCNC: 98 MG/DL (ref 70–99)
GLUCOSE BLDC GLUCOMTR-MCNC: 100 MG/DL (ref 70–99)
GLUCOSE BLDC GLUCOMTR-MCNC: 118 MG/DL (ref 70–99)
GLUCOSE UR STRIP-MCNC: NEGATIVE MG/DL
HCO3 BLD-SCNC: 45 MMOL/L (ref 21–28)
HCO3 BLD-SCNC: 46 MMOL/L (ref 21–28)
HCO3 BLD-SCNC: 47 MMOL/L (ref 21–28)
HCO3 BLD-SCNC: 47 MMOL/L (ref 21–28)
HCO3 BLDV-SCNC: 43 MMOL/L (ref 21–28)
HCO3 BLDV-SCNC: 44 MMOL/L (ref 21–28)
HCO3 BLDV-SCNC: 45 MMOL/L (ref 21–28)
HCO3 BLDV-SCNC: 46 MMOL/L (ref 21–28)
HCO3 BLDV-SCNC: 47 MMOL/L (ref 21–28)
HCO3 BLDV-SCNC: 47 MMOL/L (ref 21–28)
HCO3 BLDV-SCNC: 48 MMOL/L (ref 21–28)
HCO3 BLDV-SCNC: 48 MMOL/L (ref 21–28)
HCO3 BLDV-SCNC: 49 MMOL/L (ref 21–28)
HCO3 BLDV-SCNC: 49 MMOL/L (ref 21–28)
HCT VFR BLD AUTO: 31.4 % (ref 35–47)
HCT VFR BLD AUTO: 32.9 % (ref 35–47)
HCT VFR BLD AUTO: 33.7 % (ref 35–47)
HCT VFR BLD AUTO: 37.1 % (ref 35–47)
HCT VFR BLD AUTO: 39 % (ref 35–47)
HCT VFR BLD AUTO: 39.3 % (ref 35–47)
HGB BLD-MCNC: 10.1 G/DL (ref 11.7–15.7)
HGB BLD-MCNC: 10.2 G/DL (ref 11.7–15.7)
HGB BLD-MCNC: 10.8 G/DL (ref 11.7–15.7)
HGB BLD-MCNC: 8.3 G/DL (ref 11.7–15.7)
HGB BLD-MCNC: 8.7 G/DL (ref 11.7–15.7)
HGB BLD-MCNC: 9.1 G/DL (ref 11.7–15.7)
HGB UR QL STRIP: NEGATIVE
HOLD SPECIMEN: NORMAL
HOLD SPECIMEN: NORMAL
IMM GRANULOCYTES # BLD: 0 10E3/UL
IMM GRANULOCYTES # BLD: 0.1 10E3/UL
IMM GRANULOCYTES NFR BLD: 0 %
IMM GRANULOCYTES NFR BLD: 1 %
INTERPRETATION ECG - MUSE: NORMAL
KETONES UR STRIP-MCNC: ABNORMAL MG/DL
LACTATE BLD-SCNC: 0.6 MMOL/L
LEUKOCYTE ESTERASE UR QL STRIP: NEGATIVE
LVEF ECHO: NORMAL
LYMPHOCYTES # BLD AUTO: 0.4 10E3/UL (ref 0.8–5.3)
LYMPHOCYTES # BLD AUTO: 1 10E3/UL (ref 0.8–5.3)
LYMPHOCYTES NFR BLD AUTO: 10 %
LYMPHOCYTES NFR BLD AUTO: 3 %
M TB IFN-G BLD-IMP: ABNORMAL
M TB IFN-G BLD-IMP: ABNORMAL
M TB IFN-G CD4+ BCKGRND COR BLD-ACNC: 0.08 IU/ML
M TB IFN-G CD4+ BCKGRND COR BLD-ACNC: 0.17 IU/ML
MAGNESIUM SERPL-MCNC: 2 MG/DL (ref 1.6–2.3)
MAGNESIUM SERPL-MCNC: 2.6 MG/DL (ref 1.6–2.3)
MCH RBC QN AUTO: 27.4 PG (ref 26.5–33)
MCH RBC QN AUTO: 27.4 PG (ref 26.5–33)
MCH RBC QN AUTO: 27.5 PG (ref 26.5–33)
MCH RBC QN AUTO: 27.6 PG (ref 26.5–33)
MCH RBC QN AUTO: 27.7 PG (ref 26.5–33)
MCH RBC QN AUTO: 27.8 PG (ref 26.5–33)
MCHC RBC AUTO-ENTMCNC: 26.2 G/DL (ref 31.5–36.5)
MCHC RBC AUTO-ENTMCNC: 26.4 G/DL (ref 31.5–36.5)
MCHC RBC AUTO-ENTMCNC: 26.4 G/DL (ref 31.5–36.5)
MCHC RBC AUTO-ENTMCNC: 27 G/DL (ref 31.5–36.5)
MCHC RBC AUTO-ENTMCNC: 27.2 G/DL (ref 31.5–36.5)
MCHC RBC AUTO-ENTMCNC: 27.5 G/DL (ref 31.5–36.5)
MCV RBC AUTO: 101 FL (ref 78–100)
MCV RBC AUTO: 101 FL (ref 78–100)
MCV RBC AUTO: 103 FL (ref 78–100)
MCV RBC AUTO: 104 FL (ref 78–100)
MCV RBC AUTO: 104 FL (ref 78–100)
MCV RBC AUTO: 105 FL (ref 78–100)
MITOGEN IGNF BCKGRD COR BLD-ACNC: 0 IU/ML
MONOCYTES # BLD AUTO: 0.7 10E3/UL (ref 0–1.3)
MONOCYTES # BLD AUTO: 1.3 10E3/UL (ref 0–1.3)
MONOCYTES NFR BLD AUTO: 7 %
MONOCYTES NFR BLD AUTO: 8 %
MUCOUS THREADS #/AREA URNS LPF: PRESENT /LPF
NEUTROPHILS # BLD AUTO: 14.2 10E3/UL (ref 1.6–8.3)
NEUTROPHILS # BLD AUTO: 8.8 10E3/UL (ref 1.6–8.3)
NEUTROPHILS NFR BLD AUTO: 80 %
NEUTROPHILS NFR BLD AUTO: 87 %
NITRATE UR QL: NEGATIVE
NRBC # BLD AUTO: 0 10E3/UL
NRBC # BLD AUTO: 0 10E3/UL
NRBC BLD AUTO-RTO: 0 /100
NRBC BLD AUTO-RTO: 0 /100
O2/TOTAL GAS SETTING VFR VENT: 2 %
O2/TOTAL GAS SETTING VFR VENT: 24 %
O2/TOTAL GAS SETTING VFR VENT: 35 %
O2/TOTAL GAS SETTING VFR VENT: 4 %
O2/TOTAL GAS SETTING VFR VENT: 40 %
O2/TOTAL GAS SETTING VFR VENT: 40 %
O2/TOTAL GAS SETTING VFR VENT: 5 %
O2/TOTAL GAS SETTING VFR VENT: 50 %
O2/TOTAL GAS SETTING VFR VENT: 50 %
O2/TOTAL GAS SETTING VFR VENT: 99 %
OXYHGB MFR BLD: 77 % (ref 92–100)
OXYHGB MFR BLD: 94 % (ref 92–100)
OXYHGB MFR BLD: 96 % (ref 92–100)
OXYHGB MFR BLD: 97 % (ref 92–100)
OXYHGB MFR BLDV: 15 % (ref 70–75)
OXYHGB MFR BLDV: 32 % (ref 70–75)
OXYHGB MFR BLDV: 52 % (ref 70–75)
OXYHGB MFR BLDV: 57 % (ref 70–75)
OXYHGB MFR BLDV: 59 % (ref 70–75)
OXYHGB MFR BLDV: 88 % (ref 70–75)
P AXIS - MUSE: 78 DEGREES
P AXIS - MUSE: 87 DEGREES
P AXIS - MUSE: NORMAL DEGREES
PCO2 BLD: 78 MM HG (ref 35–45)
PCO2 BLD: 85 MM HG (ref 35–45)
PCO2 BLD: 88 MM HG (ref 35–45)
PCO2 BLD: 92 MM HG (ref 35–45)
PCO2 BLDV: 111 MM HG (ref 40–50)
PCO2 BLDV: 114 MM HG (ref 40–50)
PCO2 BLDV: 122 MM HG (ref 40–50)
PCO2 BLDV: 123 MM HG (ref 40–50)
PCO2 BLDV: 71 MM HG (ref 40–50)
PCO2 BLDV: 77 MM HG (ref 40–50)
PCO2 BLDV: 82 MM HG (ref 40–50)
PCO2 BLDV: 83 MM HG (ref 40–50)
PCO2 BLDV: 90 MM HG (ref 40–50)
PCO2 BLDV: 90 MM HG (ref 40–50)
PCO2 BLDV: 92 MM HG (ref 40–50)
PCO2 BLDV: 93 MM HG (ref 40–50)
PH BLD: 7.3 [PH] (ref 7.35–7.45)
PH BLD: 7.33 [PH] (ref 7.35–7.45)
PH BLD: 7.35 [PH] (ref 7.35–7.45)
PH BLD: 7.38 [PH] (ref 7.35–7.45)
PH BLDV: 7.21 [PH] (ref 7.32–7.43)
PH BLDV: 7.21 [PH] (ref 7.32–7.43)
PH BLDV: 7.24 [PH] (ref 7.32–7.43)
PH BLDV: 7.24 [PH] (ref 7.32–7.43)
PH BLDV: 7.29 [PH] (ref 7.32–7.43)
PH BLDV: 7.31 [PH] (ref 7.32–7.43)
PH BLDV: 7.32 [PH] (ref 7.32–7.43)
PH BLDV: 7.32 [PH] (ref 7.32–7.43)
PH BLDV: 7.34 [PH] (ref 7.32–7.43)
PH BLDV: 7.34 [PH] (ref 7.32–7.43)
PH BLDV: 7.37 [PH] (ref 7.32–7.43)
PH BLDV: 7.39 [PH] (ref 7.32–7.43)
PH UR STRIP: 6 [PH] (ref 5–7)
PLATELET # BLD AUTO: 268 10E3/UL (ref 150–450)
PLATELET # BLD AUTO: 269 10E3/UL (ref 150–450)
PLATELET # BLD AUTO: 286 10E3/UL (ref 150–450)
PLATELET # BLD AUTO: 290 10E3/UL (ref 150–450)
PLATELET # BLD AUTO: 299 10E3/UL (ref 150–450)
PLATELET # BLD AUTO: 317 10E3/UL (ref 150–450)
PLATELET # BLD AUTO: 371 10E3/UL (ref 150–450)
PO2 BLD: 105 MM HG (ref 80–105)
PO2 BLD: 134 MM HG (ref 80–105)
PO2 BLD: 43 MM HG (ref 80–105)
PO2 BLD: 84 MM HG (ref 80–105)
PO2 BLDV: 15 MM HG (ref 25–47)
PO2 BLDV: 23 MM HG (ref 25–47)
PO2 BLDV: 26 MM HG (ref 25–47)
PO2 BLDV: 31 MM HG (ref 25–47)
PO2 BLDV: 32 MM HG (ref 25–47)
PO2 BLDV: 35 MM HG (ref 25–47)
PO2 BLDV: 36 MM HG (ref 25–47)
PO2 BLDV: 40 MM HG (ref 25–47)
PO2 BLDV: 57 MM HG (ref 25–47)
PO2 BLDV: 58 MM HG (ref 25–47)
PO2 BLDV: 61 MM HG (ref 25–47)
PO2 BLDV: 65 MM HG (ref 25–47)
POTASSIUM BLD-SCNC: 3.8 MMOL/L (ref 3.4–5.3)
POTASSIUM BLD-SCNC: 4.1 MMOL/L (ref 3.4–5.3)
POTASSIUM BLD-SCNC: 4.3 MMOL/L (ref 3.4–5.3)
POTASSIUM BLD-SCNC: 4.4 MMOL/L (ref 3.4–5.3)
POTASSIUM BLD-SCNC: 4.4 MMOL/L (ref 3.4–5.3)
POTASSIUM BLD-SCNC: 4.8 MMOL/L (ref 3.4–5.3)
POTASSIUM BLD-SCNC: 4.9 MMOL/L (ref 3.4–5.3)
POTASSIUM BLD-SCNC: 5 MMOL/L (ref 3.4–5.3)
PR INTERVAL - MUSE: 176 MS
PR INTERVAL - MUSE: 178 MS
PR INTERVAL - MUSE: NORMAL MS
PROCALCITONIN SERPL-MCNC: 0.15 NG/ML
PROT SERPL-MCNC: 6.5 G/DL (ref 6.8–8.8)
QRS DURATION - MUSE: 104 MS
QRS DURATION - MUSE: 108 MS
QRS DURATION - MUSE: 110 MS
QT - MUSE: 328 MS
QT - MUSE: 330 MS
QT - MUSE: 352 MS
QTC - MUSE: 449 MS
QTC - MUSE: 489 MS
QTC - MUSE: 491 MS
QUANTIFERON MITOGEN: 0.08 IU/ML
QUANTIFERON MITOGEN: 0.17 IU/ML
QUANTIFERON NIL TUBE: 0 IU/ML
QUANTIFERON NIL TUBE: 0 IU/ML
QUANTIFERON TB1 TUBE: 0 IU/ML
QUANTIFERON TB1 TUBE: 0 IU/ML
QUANTIFERON TB2 TUBE: 0
QUANTIFERON TB2 TUBE: 0
R AXIS - MUSE: -41 DEGREES
R AXIS - MUSE: -55 DEGREES
R AXIS - MUSE: -56 DEGREES
RBC # BLD AUTO: 3.02 10E6/UL (ref 3.8–5.2)
RBC # BLD AUTO: 3.18 10E6/UL (ref 3.8–5.2)
RBC # BLD AUTO: 3.28 10E6/UL (ref 3.8–5.2)
RBC # BLD AUTO: 3.66 10E6/UL (ref 3.8–5.2)
RBC # BLD AUTO: 3.72 10E6/UL (ref 3.8–5.2)
RBC # BLD AUTO: 3.89 10E6/UL (ref 3.8–5.2)
RBC URINE: 1 /HPF
SAO2 % BLDV: 87 % (ref 94–100)
SARS-COV-2 RNA RESP QL NAA+PROBE: NEGATIVE
SODIUM SERPL-SCNC: 139 MMOL/L (ref 133–144)
SODIUM SERPL-SCNC: 140 MMOL/L (ref 133–144)
SODIUM SERPL-SCNC: 142 MMOL/L (ref 133–144)
SODIUM SERPL-SCNC: 142 MMOL/L (ref 133–144)
SODIUM SERPL-SCNC: 144 MMOL/L (ref 133–144)
SODIUM SERPL-SCNC: 146 MMOL/L (ref 133–144)
SP GR UR STRIP: 1.02 (ref 1–1.03)
SQUAMOUS EPITHELIAL: <1 /HPF
SYSTOLIC BLOOD PRESSURE - MUSE: NORMAL MMHG
T AXIS - MUSE: 48 DEGREES
T AXIS - MUSE: 56 DEGREES
T AXIS - MUSE: 61 DEGREES
TROPONIN I SERPL HS-MCNC: 12 NG/L
TROPONIN I SERPL HS-MCNC: 62 NG/L
TROPONIN I SERPL HS-MCNC: 63 NG/L
UROBILINOGEN UR STRIP-MCNC: NORMAL MG/DL
VENTRICULAR RATE- MUSE: 113 BPM
VENTRICULAR RATE- MUSE: 116 BPM
VENTRICULAR RATE- MUSE: 133 BPM
WBC # BLD AUTO: 10.9 10E3/UL (ref 4–11)
WBC # BLD AUTO: 11.3 10E3/UL (ref 4–11)
WBC # BLD AUTO: 14.2 10E3/UL (ref 4–11)
WBC # BLD AUTO: 14.5 10E3/UL (ref 4–11)
WBC # BLD AUTO: 16.2 10E3/UL (ref 4–11)
WBC # BLD AUTO: 6.4 10E3/UL (ref 4–11)
WBC URINE: <1 /HPF

## 2021-01-01 PROCEDURE — 87635 SARS-COV-2 COVID-19 AMP PRB: CPT | Performed by: EMERGENCY MEDICINE

## 2021-01-01 PROCEDURE — 94640 AIRWAY INHALATION TREATMENT: CPT

## 2021-01-01 PROCEDURE — 94660 CPAP INITIATION&MGMT: CPT

## 2021-01-01 PROCEDURE — 999N000157 HC STATISTIC RCP TIME EA 10 MIN

## 2021-01-01 PROCEDURE — U0005 INFEC AGEN DETEC AMPLI PROBE: HCPCS | Performed by: NURSE PRACTITIONER

## 2021-01-01 PROCEDURE — 71045 X-RAY EXAM CHEST 1 VIEW: CPT

## 2021-01-01 PROCEDURE — 84145 PROCALCITONIN (PCT): CPT | Performed by: INTERNAL MEDICINE

## 2021-01-01 PROCEDURE — 250N000009 HC RX 250: Performed by: EMERGENCY MEDICINE

## 2021-01-01 PROCEDURE — 99285 EMERGENCY DEPT VISIT HI MDM: CPT | Mod: 25

## 2021-01-01 PROCEDURE — 82805 BLOOD GASES W/O2 SATURATION: CPT | Performed by: EMERGENCY MEDICINE

## 2021-01-01 PROCEDURE — C9803 HOPD COVID-19 SPEC COLLECT: HCPCS

## 2021-01-01 PROCEDURE — 97162 PT EVAL MOD COMPLEX 30 MIN: CPT | Mod: GP | Performed by: PHYSICAL THERAPIST

## 2021-01-01 PROCEDURE — 82803 BLOOD GASES ANY COMBINATION: CPT | Performed by: INTERNAL MEDICINE

## 2021-01-01 PROCEDURE — 96367 TX/PROPH/DG ADDL SEQ IV INF: CPT

## 2021-01-01 PROCEDURE — 36415 COLL VENOUS BLD VENIPUNCTURE: CPT | Performed by: INTERNAL MEDICINE

## 2021-01-01 PROCEDURE — 97530 THERAPEUTIC ACTIVITIES: CPT | Mod: GP | Performed by: PHYSICAL THERAPIST

## 2021-01-01 PROCEDURE — 36415 COLL VENOUS BLD VENIPUNCTURE: CPT | Performed by: NURSE PRACTITIONER

## 2021-01-01 PROCEDURE — 0JQK0ZZ REPAIR LEFT HAND SUBCUTANEOUS TISSUE AND FASCIA, OPEN APPROACH: ICD-10-PCS | Performed by: EMERGENCY MEDICINE

## 2021-01-01 PROCEDURE — 250N000011 HC RX IP 250 OP 636

## 2021-01-01 PROCEDURE — 93005 ELECTROCARDIOGRAM TRACING: CPT

## 2021-01-01 PROCEDURE — 85025 COMPLETE CBC W/AUTO DIFF WBC: CPT | Performed by: EMERGENCY MEDICINE

## 2021-01-01 PROCEDURE — 99214 OFFICE O/P EST MOD 30 MIN: CPT | Mod: 95 | Performed by: PHYSICIAN ASSISTANT

## 2021-01-01 PROCEDURE — 80048 BASIC METABOLIC PNL TOTAL CA: CPT | Performed by: EMERGENCY MEDICINE

## 2021-01-01 PROCEDURE — 93005 ELECTROCARDIOGRAM TRACING: CPT | Mod: 59

## 2021-01-01 PROCEDURE — 250N000013 HC RX MED GY IP 250 OP 250 PS 637: Performed by: PHYSICIAN ASSISTANT

## 2021-01-01 PROCEDURE — 83735 ASSAY OF MAGNESIUM: CPT | Performed by: HOSPITALIST

## 2021-01-01 PROCEDURE — 85027 COMPLETE CBC AUTOMATED: CPT | Performed by: INTERNAL MEDICINE

## 2021-01-01 PROCEDURE — 99233 SBSQ HOSP IP/OBS HIGH 50: CPT | Performed by: INTERNAL MEDICINE

## 2021-01-01 PROCEDURE — 96376 TX/PRO/DX INJ SAME DRUG ADON: CPT | Mod: 59

## 2021-01-01 PROCEDURE — 99239 HOSP IP/OBS DSCHRG MGMT >30: CPT | Performed by: INTERNAL MEDICINE

## 2021-01-01 PROCEDURE — 250N000009 HC RX 250: Performed by: INTERNAL MEDICINE

## 2021-01-01 PROCEDURE — 999N000105 HC STATISTIC NO DOCUMENTATION TO SUPPORT CHARGE

## 2021-01-01 PROCEDURE — P9604 ONE-WAY ALLOW PRORATED TRIP: HCPCS | Performed by: NURSE PRACTITIONER

## 2021-01-01 PROCEDURE — 85027 COMPLETE CBC AUTOMATED: CPT | Performed by: NURSE PRACTITIONER

## 2021-01-01 PROCEDURE — 250N000013 HC RX MED GY IP 250 OP 250 PS 637: Performed by: INTERNAL MEDICINE

## 2021-01-01 PROCEDURE — 80048 BASIC METABOLIC PNL TOTAL CA: CPT | Performed by: INTERNAL MEDICINE

## 2021-01-01 PROCEDURE — 258N000003 HC RX IP 258 OP 636: Performed by: EMERGENCY MEDICINE

## 2021-01-01 PROCEDURE — 250N000012 HC RX MED GY IP 250 OP 636 PS 637: Performed by: INTERNAL MEDICINE

## 2021-01-01 PROCEDURE — 120N000001 HC R&B MED SURG/OB

## 2021-01-01 PROCEDURE — 85014 HEMATOCRIT: CPT | Performed by: INTERNAL MEDICINE

## 2021-01-01 PROCEDURE — 120N000013 HC R&B IMCU

## 2021-01-01 PROCEDURE — 80053 COMPREHEN METABOLIC PANEL: CPT | Performed by: EMERGENCY MEDICINE

## 2021-01-01 PROCEDURE — 250N000011 HC RX IP 250 OP 636: Performed by: INTERNAL MEDICINE

## 2021-01-01 PROCEDURE — 80048 BASIC METABOLIC PNL TOTAL CA: CPT | Performed by: NURSE PRACTITIONER

## 2021-01-01 PROCEDURE — 99232 SBSQ HOSP IP/OBS MODERATE 35: CPT | Performed by: INTERNAL MEDICINE

## 2021-01-01 PROCEDURE — 96375 TX/PRO/DX INJ NEW DRUG ADDON: CPT | Mod: 59

## 2021-01-01 PROCEDURE — G0180 MD CERTIFICATION HHA PATIENT: HCPCS | Performed by: PHYSICIAN ASSISTANT

## 2021-01-01 PROCEDURE — 2W3KX1Z IMMOBILIZATION OF LEFT FINGER USING SPLINT: ICD-10-PCS | Performed by: EMERGENCY MEDICINE

## 2021-01-01 PROCEDURE — 85049 AUTOMATED PLATELET COUNT: CPT | Performed by: INTERNAL MEDICINE

## 2021-01-01 PROCEDURE — 97165 OT EVAL LOW COMPLEX 30 MIN: CPT | Mod: GO

## 2021-01-01 PROCEDURE — 93306 TTE W/DOPPLER COMPLETE: CPT

## 2021-01-01 PROCEDURE — 97535 SELF CARE MNGMENT TRAINING: CPT | Mod: GO

## 2021-01-01 PROCEDURE — 93306 TTE W/DOPPLER COMPLETE: CPT | Mod: 26 | Performed by: INTERNAL MEDICINE

## 2021-01-01 PROCEDURE — 82565 ASSAY OF CREATININE: CPT | Performed by: INTERNAL MEDICINE

## 2021-01-01 PROCEDURE — 96365 THER/PROPH/DIAG IV INF INIT: CPT

## 2021-01-01 PROCEDURE — 70450 CT HEAD/BRAIN W/O DYE: CPT | Mod: MG

## 2021-01-01 PROCEDURE — 36415 COLL VENOUS BLD VENIPUNCTURE: CPT | Performed by: EMERGENCY MEDICINE

## 2021-01-01 PROCEDURE — 94640 AIRWAY INHALATION TREATMENT: CPT | Mod: 76

## 2021-01-01 PROCEDURE — 97161 PT EVAL LOW COMPLEX 20 MIN: CPT | Mod: GP | Performed by: PHYSICAL THERAPIST

## 2021-01-01 PROCEDURE — 84484 ASSAY OF TROPONIN QUANT: CPT | Performed by: EMERGENCY MEDICINE

## 2021-01-01 PROCEDURE — 82803 BLOOD GASES ANY COMBINATION: CPT

## 2021-01-01 PROCEDURE — G0463 HOSPITAL OUTPT CLINIC VISIT: HCPCS

## 2021-01-01 PROCEDURE — 97110 THERAPEUTIC EXERCISES: CPT | Mod: GP | Performed by: PHYSICAL THERAPIST

## 2021-01-01 PROCEDURE — 81001 URINALYSIS AUTO W/SCOPE: CPT | Performed by: EMERGENCY MEDICINE

## 2021-01-01 PROCEDURE — G0439 PPPS, SUBSEQ VISIT: HCPCS | Mod: 95 | Performed by: PHYSICIAN ASSISTANT

## 2021-01-01 PROCEDURE — 83735 ASSAY OF MAGNESIUM: CPT | Performed by: NURSE PRACTITIONER

## 2021-01-01 PROCEDURE — 82805 BLOOD GASES W/O2 SATURATION: CPT | Performed by: INTERNAL MEDICINE

## 2021-01-01 PROCEDURE — 99207 PR NON-BILLABLE SERV PER CHARTING: CPT | Performed by: NURSE PRACTITIONER

## 2021-01-01 PROCEDURE — 96367 TX/PROPH/DG ADDL SEQ IV INF: CPT | Mod: 59

## 2021-01-01 PROCEDURE — 73130 X-RAY EXAM OF HAND: CPT | Mod: LT

## 2021-01-01 PROCEDURE — 71250 CT THORAX DX C-: CPT | Mod: MG

## 2021-01-01 PROCEDURE — 29130 APPL FINGER SPLINT STATIC: CPT | Mod: F1

## 2021-01-01 PROCEDURE — 99232 SBSQ HOSP IP/OBS MODERATE 35: CPT | Performed by: NURSE PRACTITIONER

## 2021-01-01 PROCEDURE — 12002 RPR S/N/AX/GEN/TRNK2.6-7.5CM: CPT | Mod: 59

## 2021-01-01 PROCEDURE — 97116 GAIT TRAINING THERAPY: CPT | Mod: GP

## 2021-01-01 PROCEDURE — 99207 PR CONSULT E&M CHANGED TO INITIAL LEVEL: CPT | Performed by: NURSE PRACTITIONER

## 2021-01-01 PROCEDURE — 258N000003 HC RX IP 258 OP 636: Performed by: INTERNAL MEDICINE

## 2021-01-01 PROCEDURE — 250N000013 HC RX MED GY IP 250 OP 250 PS 637: Performed by: NURSE PRACTITIONER

## 2021-01-01 PROCEDURE — 96375 TX/PRO/DX INJ NEW DRUG ADDON: CPT

## 2021-01-01 PROCEDURE — 250N000011 HC RX IP 250 OP 636: Performed by: NURSE PRACTITIONER

## 2021-01-01 PROCEDURE — 99309 SBSQ NF CARE MODERATE MDM 30: CPT | Performed by: NURSE PRACTITIONER

## 2021-01-01 PROCEDURE — 99306 1ST NF CARE HIGH MDM 50: CPT | Performed by: NURSE PRACTITIONER

## 2021-01-01 PROCEDURE — 250N000013 HC RX MED GY IP 250 OP 250 PS 637: Performed by: HOSPITALIST

## 2021-01-01 PROCEDURE — 99305 1ST NF CARE MODERATE MDM 35: CPT | Performed by: FAMILY MEDICINE

## 2021-01-01 PROCEDURE — 5A09357 ASSISTANCE WITH RESPIRATORY VENTILATION, LESS THAN 24 CONSECUTIVE HOURS, CONTINUOUS POSITIVE AIRWAY PRESSURE: ICD-10-PCS | Performed by: EMERGENCY MEDICINE

## 2021-01-01 PROCEDURE — 97530 THERAPEUTIC ACTIVITIES: CPT | Mod: GP

## 2021-01-01 PROCEDURE — 86481 TB AG RESPONSE T-CELL SUSP: CPT | Performed by: NURSE PRACTITIONER

## 2021-01-01 PROCEDURE — 96361 HYDRATE IV INFUSION ADD-ON: CPT

## 2021-01-01 PROCEDURE — 250N000011 HC RX IP 250 OP 636: Performed by: EMERGENCY MEDICINE

## 2021-01-01 PROCEDURE — 84484 ASSAY OF TROPONIN QUANT: CPT | Performed by: INTERNAL MEDICINE

## 2021-01-01 PROCEDURE — 99223 1ST HOSP IP/OBS HIGH 75: CPT | Mod: AI | Performed by: INTERNAL MEDICINE

## 2021-01-01 PROCEDURE — 99223 1ST HOSP IP/OBS HIGH 75: CPT | Performed by: NURSE PRACTITIONER

## 2021-01-01 PROCEDURE — U0003 INFECTIOUS AGENT DETECTION BY NUCLEIC ACID (DNA OR RNA); SEVERE ACUTE RESPIRATORY SYNDROME CORONAVIRUS 2 (SARS-COV-2) (CORONAVIRUS DISEASE [COVID-19]), AMPLIFIED PROBE TECHNIQUE, MAKING USE OF HIGH THROUGHPUT TECHNOLOGIES AS DESCRIBED BY CMS-2020-01-R: HCPCS | Performed by: NURSE PRACTITIONER

## 2021-01-01 PROCEDURE — 96366 THER/PROPH/DIAG IV INF ADDON: CPT

## 2021-01-01 PROCEDURE — 5A0955Z ASSISTANCE WITH RESPIRATORY VENTILATION, GREATER THAN 96 CONSECUTIVE HOURS: ICD-10-PCS | Performed by: EMERGENCY MEDICINE

## 2021-01-01 PROCEDURE — 250N000009 HC RX 250

## 2021-01-01 PROCEDURE — 96365 THER/PROPH/DIAG IV INF INIT: CPT | Mod: 59

## 2021-01-01 RX ORDER — LIDOCAINE 40 MG/G
CREAM TOPICAL
Status: DISCONTINUED | OUTPATIENT
Start: 2021-01-01 | End: 2021-01-01 | Stop reason: HOSPADM

## 2021-01-01 RX ORDER — PREDNISONE 20 MG/1
40 TABLET ORAL DAILY
Status: DISCONTINUED | OUTPATIENT
Start: 2021-01-01 | End: 2021-01-01

## 2021-01-01 RX ORDER — ACETAMINOPHEN 500 MG
1000 TABLET ORAL 3 TIMES DAILY
COMMUNITY
Start: 2021-01-01

## 2021-01-01 RX ORDER — MAGNESIUM SULFATE HEPTAHYDRATE 40 MG/ML
2 INJECTION, SOLUTION INTRAVENOUS ONCE
Status: COMPLETED | OUTPATIENT
Start: 2021-01-01 | End: 2021-01-01

## 2021-01-01 RX ORDER — METHYLPREDNISOLONE SODIUM SUCCINATE 40 MG/ML
40 INJECTION, POWDER, LYOPHILIZED, FOR SOLUTION INTRAMUSCULAR; INTRAVENOUS EVERY 12 HOURS
Status: DISCONTINUED | OUTPATIENT
Start: 2021-01-01 | End: 2021-01-01

## 2021-01-01 RX ORDER — IPRATROPIUM BROMIDE AND ALBUTEROL SULFATE 2.5; .5 MG/3ML; MG/3ML
3 SOLUTION RESPIRATORY (INHALATION) ONCE
Status: COMPLETED | OUTPATIENT
Start: 2021-01-01 | End: 2021-01-01

## 2021-01-01 RX ORDER — PREDNISONE 20 MG/1
TABLET ORAL
Qty: 20 TABLET | Refills: 0 | DISCHARGE
Start: 2021-01-01

## 2021-01-01 RX ORDER — AZITHROMYCIN 250 MG/1
250 TABLET, FILM COATED ORAL DAILY
Status: DISCONTINUED | OUTPATIENT
Start: 2021-01-01 | End: 2021-01-01 | Stop reason: HOSPADM

## 2021-01-01 RX ORDER — VENLAFAXINE HYDROCHLORIDE 75 MG/1
75 CAPSULE, EXTENDED RELEASE ORAL DAILY
Qty: 90 CAPSULE | Refills: 3 | Status: ON HOLD | OUTPATIENT
Start: 2021-01-01 | End: 2021-01-01

## 2021-01-01 RX ORDER — LORAZEPAM 2 MG/ML
INJECTION INTRAMUSCULAR
Status: COMPLETED
Start: 2021-01-01 | End: 2021-01-01

## 2021-01-01 RX ORDER — VENLAFAXINE HYDROCHLORIDE 75 MG/1
75 CAPSULE, EXTENDED RELEASE ORAL DAILY
Status: DISCONTINUED | OUTPATIENT
Start: 2021-01-01 | End: 2021-01-01

## 2021-01-01 RX ORDER — VENLAFAXINE HYDROCHLORIDE 75 MG/1
75 CAPSULE, EXTENDED RELEASE ORAL DAILY
Qty: 30 CAPSULE | Refills: 1 | Status: SHIPPED | OUTPATIENT
Start: 2021-01-01 | End: 2021-01-01

## 2021-01-01 RX ORDER — PIPERACILLIN SODIUM, TAZOBACTAM SODIUM 4; .5 G/20ML; G/20ML
4.5 INJECTION, POWDER, LYOPHILIZED, FOR SOLUTION INTRAVENOUS EVERY 6 HOURS
Status: DISCONTINUED | OUTPATIENT
Start: 2021-01-01 | End: 2021-01-01

## 2021-01-01 RX ORDER — OLANZAPINE 10 MG/2ML
5 INJECTION, POWDER, FOR SOLUTION INTRAMUSCULAR ONCE
Status: COMPLETED | OUTPATIENT
Start: 2021-01-01 | End: 2021-01-01

## 2021-01-01 RX ORDER — METHYLPREDNISOLONE SODIUM SUCCINATE 125 MG/2ML
60 INJECTION, POWDER, LYOPHILIZED, FOR SOLUTION INTRAMUSCULAR; INTRAVENOUS EVERY 6 HOURS
Status: DISCONTINUED | OUTPATIENT
Start: 2021-01-01 | End: 2021-01-01

## 2021-01-01 RX ORDER — METHYLPREDNISOLONE SODIUM SUCCINATE 125 MG/2ML
125 INJECTION, POWDER, LYOPHILIZED, FOR SOLUTION INTRAMUSCULAR; INTRAVENOUS ONCE
Status: COMPLETED | OUTPATIENT
Start: 2021-01-01 | End: 2021-01-01

## 2021-01-01 RX ORDER — DILTIAZEM HYDROCHLORIDE 30 MG/1
15 TABLET, FILM COATED ORAL 4 TIMES DAILY
Status: DISCONTINUED | OUTPATIENT
Start: 2021-01-01 | End: 2021-01-01

## 2021-01-01 RX ORDER — METHYLPREDNISOLONE SODIUM SUCCINATE 125 MG/2ML
60 INJECTION, POWDER, LYOPHILIZED, FOR SOLUTION INTRAMUSCULAR; INTRAVENOUS ONCE
Status: COMPLETED | OUTPATIENT
Start: 2021-01-01 | End: 2021-01-01

## 2021-01-01 RX ORDER — AMOXICILLIN 250 MG
1 CAPSULE ORAL 2 TIMES DAILY PRN
Status: DISCONTINUED | OUTPATIENT
Start: 2021-01-01 | End: 2022-01-01 | Stop reason: HOSPADM

## 2021-01-01 RX ORDER — CARBOXYMETHYLCELLULOSE SODIUM 5 MG/ML
1 SOLUTION/ DROPS OPHTHALMIC
Status: DISCONTINUED | OUTPATIENT
Start: 2021-01-01 | End: 2021-01-01 | Stop reason: HOSPADM

## 2021-01-01 RX ORDER — METHYLPREDNISOLONE 4 MG/1
4 TABLET ORAL DAILY
Status: DISCONTINUED | OUTPATIENT
Start: 2021-01-01 | End: 2022-01-01

## 2021-01-01 RX ORDER — DILTIAZEM HYDROCHLORIDE 120 MG/1
120 CAPSULE, COATED, EXTENDED RELEASE ORAL DAILY
DISCHARGE
Start: 2021-01-01

## 2021-01-01 RX ORDER — IPRATROPIUM BROMIDE AND ALBUTEROL SULFATE 2.5; .5 MG/3ML; MG/3ML
3 SOLUTION RESPIRATORY (INHALATION) 4 TIMES DAILY
Status: DISCONTINUED | OUTPATIENT
Start: 2021-01-01 | End: 2021-01-01 | Stop reason: ALTCHOICE

## 2021-01-01 RX ORDER — ACETAMINOPHEN 500 MG
1000 TABLET ORAL 3 TIMES DAILY
Status: DISCONTINUED | OUTPATIENT
Start: 2021-01-01 | End: 2022-01-01

## 2021-01-01 RX ORDER — AMOXICILLIN 250 MG
1 CAPSULE ORAL 2 TIMES DAILY PRN
Status: DISCONTINUED | OUTPATIENT
Start: 2021-01-01 | End: 2021-01-01 | Stop reason: HOSPADM

## 2021-01-01 RX ORDER — METOPROLOL TARTRATE 25 MG/1
25 TABLET, FILM COATED ORAL ONCE
Status: COMPLETED | OUTPATIENT
Start: 2021-01-01 | End: 2021-01-01

## 2021-01-01 RX ORDER — ALBUTEROL SULFATE 0.83 MG/ML
2.5 SOLUTION RESPIRATORY (INHALATION) EVERY 6 HOURS PRN
Status: DISCONTINUED | OUTPATIENT
Start: 2021-01-01 | End: 2022-01-01 | Stop reason: HOSPADM

## 2021-01-01 RX ORDER — AMOXICILLIN 250 MG
2 CAPSULE ORAL 2 TIMES DAILY PRN
Status: DISCONTINUED | OUTPATIENT
Start: 2021-01-01 | End: 2022-01-01 | Stop reason: HOSPADM

## 2021-01-01 RX ORDER — LEVALBUTEROL 1.25 MG/.5ML
1.25 SOLUTION, CONCENTRATE RESPIRATORY (INHALATION)
Status: DISCONTINUED | OUTPATIENT
Start: 2021-01-01 | End: 2022-01-01

## 2021-01-01 RX ORDER — LIDOCAINE 40 MG/G
CREAM TOPICAL
Status: DISCONTINUED | OUTPATIENT
Start: 2021-01-01 | End: 2021-01-01

## 2021-01-01 RX ORDER — CEFAZOLIN SODIUM 2 G/100ML
2 INJECTION, SOLUTION INTRAVENOUS ONCE
Status: COMPLETED | OUTPATIENT
Start: 2021-01-01 | End: 2021-01-01

## 2021-01-01 RX ORDER — CARBOXYMETHYLCELLULOSE SODIUM 5 MG/ML
1 SOLUTION/ DROPS OPHTHALMIC
Status: DISCONTINUED | OUTPATIENT
Start: 2021-01-01 | End: 2021-01-01

## 2021-01-01 RX ORDER — LIDOCAINE 40 MG/G
CREAM TOPICAL
Status: DISCONTINUED | OUTPATIENT
Start: 2021-01-01 | End: 2022-01-01 | Stop reason: HOSPADM

## 2021-01-01 RX ORDER — ACETAMINOPHEN 325 MG/1
650 TABLET ORAL EVERY 6 HOURS PRN
Status: DISCONTINUED | OUTPATIENT
Start: 2021-01-01 | End: 2022-01-01 | Stop reason: HOSPADM

## 2021-01-01 RX ORDER — ONDANSETRON 2 MG/ML
4 INJECTION INTRAMUSCULAR; INTRAVENOUS EVERY 6 HOURS PRN
Status: DISCONTINUED | OUTPATIENT
Start: 2021-01-01 | End: 2021-01-01 | Stop reason: HOSPADM

## 2021-01-01 RX ORDER — AZITHROMYCIN 500 MG/5ML
500 INJECTION, POWDER, LYOPHILIZED, FOR SOLUTION INTRAVENOUS ONCE
Status: COMPLETED | OUTPATIENT
Start: 2021-01-01 | End: 2021-01-01

## 2021-01-01 RX ORDER — AMOXICILLIN 250 MG
1 CAPSULE ORAL DAILY
COMMUNITY

## 2021-01-01 RX ORDER — NITROGLYCERIN 0.4 MG/1
0.4 TABLET SUBLINGUAL EVERY 5 MIN PRN
Status: DISCONTINUED | OUTPATIENT
Start: 2021-01-01 | End: 2021-01-01

## 2021-01-01 RX ORDER — NITROGLYCERIN 0.4 MG/1
0.4 TABLET SUBLINGUAL EVERY 5 MIN PRN
Status: DISCONTINUED | OUTPATIENT
Start: 2021-01-01 | End: 2021-01-01 | Stop reason: HOSPADM

## 2021-01-01 RX ORDER — VENLAFAXINE HYDROCHLORIDE 37.5 MG/1
CAPSULE, EXTENDED RELEASE ORAL
Qty: 90 CAPSULE | Refills: 0 | Status: SHIPPED | OUTPATIENT
Start: 2021-01-01 | End: 2021-01-01 | Stop reason: DRUGHIGH

## 2021-01-01 RX ORDER — WATER 10 ML/10ML
INJECTION INTRAMUSCULAR; INTRAVENOUS; SUBCUTANEOUS
Status: COMPLETED
Start: 2021-01-01 | End: 2021-01-01

## 2021-01-01 RX ORDER — PIPERACILLIN SODIUM, TAZOBACTAM SODIUM 4; .5 G/20ML; G/20ML
4.5 INJECTION, POWDER, LYOPHILIZED, FOR SOLUTION INTRAVENOUS ONCE
Status: COMPLETED | OUTPATIENT
Start: 2021-01-01 | End: 2021-01-01

## 2021-01-01 RX ORDER — LIDOCAINE HYDROCHLORIDE 10 MG/ML
INJECTION, SOLUTION EPIDURAL; INFILTRATION; INTRACAUDAL; PERINEURAL
Status: DISCONTINUED
Start: 2021-01-01 | End: 2021-01-01 | Stop reason: HOSPADM

## 2021-01-01 RX ORDER — CEPHALEXIN 250 MG/5ML
500 POWDER, FOR SUSPENSION ORAL 3 TIMES DAILY
Status: DISCONTINUED | OUTPATIENT
Start: 2021-01-01 | End: 2021-01-01

## 2021-01-01 RX ORDER — AMOXICILLIN 250 MG
1 CAPSULE ORAL DAILY
Status: DISCONTINUED | OUTPATIENT
Start: 2021-01-01 | End: 2022-01-01 | Stop reason: HOSPADM

## 2021-01-01 RX ORDER — CEPHALEXIN 250 MG/5ML
500 POWDER, FOR SUSPENSION ORAL 3 TIMES DAILY
Status: DISCONTINUED | OUTPATIENT
Start: 2021-01-01 | End: 2021-01-01 | Stop reason: HOSPADM

## 2021-01-01 RX ORDER — DILTIAZEM HYDROCHLORIDE 120 MG/1
120 CAPSULE, COATED, EXTENDED RELEASE ORAL DAILY
Status: DISCONTINUED | OUTPATIENT
Start: 2021-01-01 | End: 2021-01-01 | Stop reason: HOSPADM

## 2021-01-01 RX ORDER — ACETAMINOPHEN 650 MG/1
650 SUPPOSITORY RECTAL EVERY 6 HOURS PRN
Status: DISCONTINUED | OUTPATIENT
Start: 2021-01-01 | End: 2022-01-01 | Stop reason: HOSPADM

## 2021-01-01 RX ORDER — IPRATROPIUM BROMIDE AND ALBUTEROL SULFATE 2.5; .5 MG/3ML; MG/3ML
3 SOLUTION RESPIRATORY (INHALATION)
Status: DISCONTINUED | OUTPATIENT
Start: 2021-01-01 | End: 2021-01-01

## 2021-01-01 RX ORDER — PIPERACILLIN SODIUM, TAZOBACTAM SODIUM 3; .375 G/15ML; G/15ML
3.38 INJECTION, POWDER, LYOPHILIZED, FOR SOLUTION INTRAVENOUS EVERY 6 HOURS
Status: DISCONTINUED | OUTPATIENT
Start: 2021-01-01 | End: 2022-01-01

## 2021-01-01 RX ORDER — DILTIAZEM HYDROCHLORIDE 120 MG/1
120 CAPSULE, COATED, EXTENDED RELEASE ORAL DAILY
Status: DISCONTINUED | OUTPATIENT
Start: 2021-01-01 | End: 2022-01-01 | Stop reason: HOSPADM

## 2021-01-01 RX ORDER — OLANZAPINE 5 MG/1
5 TABLET, ORALLY DISINTEGRATING ORAL 2 TIMES DAILY
Status: DISCONTINUED | OUTPATIENT
Start: 2021-01-01 | End: 2022-01-01

## 2021-01-01 RX ORDER — ONDANSETRON 4 MG/1
4 TABLET, ORALLY DISINTEGRATING ORAL EVERY 6 HOURS PRN
Status: DISCONTINUED | OUTPATIENT
Start: 2021-01-01 | End: 2022-01-01 | Stop reason: HOSPADM

## 2021-01-01 RX ORDER — QUETIAPINE FUMARATE 25 MG/1
25-50 TABLET, FILM COATED ORAL EVERY 4 HOURS PRN
Status: DISCONTINUED | OUTPATIENT
Start: 2021-01-01 | End: 2022-01-01 | Stop reason: HOSPADM

## 2021-01-01 RX ORDER — AMOXICILLIN 250 MG
2 CAPSULE ORAL 2 TIMES DAILY PRN
Status: DISCONTINUED | OUTPATIENT
Start: 2021-01-01 | End: 2021-01-01 | Stop reason: HOSPADM

## 2021-01-01 RX ORDER — CARBOXYMETHYLCELLULOSE SODIUM 5 MG/ML
1 SOLUTION/ DROPS OPHTHALMIC
Status: DISCONTINUED | OUTPATIENT
Start: 2021-01-01 | End: 2022-01-01 | Stop reason: HOSPADM

## 2021-01-01 RX ORDER — HALOPERIDOL 5 MG/ML
2 INJECTION INTRAMUSCULAR ONCE
Status: COMPLETED | OUTPATIENT
Start: 2021-01-01 | End: 2021-01-01

## 2021-01-01 RX ORDER — CEPHALEXIN 500 MG/1
500 CAPSULE ORAL 3 TIMES DAILY
DISCHARGE
Start: 2021-01-01 | End: 2021-01-01

## 2021-01-01 RX ORDER — AZITHROMYCIN 250 MG/1
250 TABLET, FILM COATED ORAL DAILY
DISCHARGE
Start: 2021-01-01 | End: 2021-01-01

## 2021-01-01 RX ORDER — GLIPIZIDE 10 MG/1
1 TABLET ORAL
Status: DISCONTINUED | OUTPATIENT
Start: 2021-01-01 | End: 2021-01-01

## 2021-01-01 RX ORDER — ACETAMINOPHEN 325 MG/1
975 TABLET ORAL EVERY 8 HOURS
Status: DISCONTINUED | OUTPATIENT
Start: 2021-01-01 | End: 2021-01-01 | Stop reason: HOSPADM

## 2021-01-01 RX ORDER — PREDNISONE 20 MG/1
40 TABLET ORAL DAILY
Status: DISCONTINUED | OUTPATIENT
Start: 2021-01-01 | End: 2021-01-01 | Stop reason: ALTCHOICE

## 2021-01-01 RX ORDER — ONDANSETRON 2 MG/ML
4 INJECTION INTRAMUSCULAR; INTRAVENOUS EVERY 6 HOURS PRN
Status: DISCONTINUED | OUTPATIENT
Start: 2021-01-01 | End: 2022-01-01 | Stop reason: HOSPADM

## 2021-01-01 RX ORDER — ONDANSETRON 4 MG/1
4 TABLET, ORALLY DISINTEGRATING ORAL EVERY 6 HOURS PRN
Status: DISCONTINUED | OUTPATIENT
Start: 2021-01-01 | End: 2021-01-01 | Stop reason: HOSPADM

## 2021-01-01 RX ADMIN — IPRATROPIUM BROMIDE AND ALBUTEROL 1 PUFF: 20; 100 SPRAY, METERED RESPIRATORY (INHALATION) at 15:49

## 2021-01-01 RX ADMIN — OLANZAPINE 5 MG: 5 TABLET, ORALLY DISINTEGRATING ORAL at 11:14

## 2021-01-01 RX ADMIN — METHYLPREDNISOLONE 4 MG: 4 TABLET ORAL at 11:14

## 2021-01-01 RX ADMIN — PIPERACILLIN SODIUM AND TAZOBACTAM SODIUM 3.38 G: 3; .375 INJECTION, POWDER, LYOPHILIZED, FOR SOLUTION INTRAVENOUS at 00:48

## 2021-01-01 RX ADMIN — IPRATROPIUM BROMIDE AND ALBUTEROL SULFATE 3 ML: .5; 3 SOLUTION RESPIRATORY (INHALATION) at 16:21

## 2021-01-01 RX ADMIN — ACETAMINOPHEN 975 MG: 325 TABLET, FILM COATED ORAL at 23:21

## 2021-01-01 RX ADMIN — FLUTICASONE FUROATE AND VILANTEROL TRIFENATATE 1 PUFF: 100; 25 POWDER RESPIRATORY (INHALATION) at 07:20

## 2021-01-01 RX ADMIN — ENOXAPARIN SODIUM 40 MG: 40 INJECTION SUBCUTANEOUS at 16:53

## 2021-01-01 RX ADMIN — METHYLPREDNISOLONE SODIUM SUCCINATE 62.5 MG: 125 INJECTION, POWDER, FOR SOLUTION INTRAMUSCULAR; INTRAVENOUS at 19:16

## 2021-01-01 RX ADMIN — CEPHALEXIN 500 MG: 250 POWDER, FOR SUSPENSION ORAL at 13:21

## 2021-01-01 RX ADMIN — CEPHALEXIN 500 MG: 250 POWDER, FOR SUSPENSION ORAL at 09:23

## 2021-01-01 RX ADMIN — DEXTROSE AND SODIUM CHLORIDE: 5; 900 INJECTION, SOLUTION INTRAVENOUS at 17:30

## 2021-01-01 RX ADMIN — ACETAMINOPHEN 1000 MG: 500 TABLET, FILM COATED ORAL at 10:00

## 2021-01-01 RX ADMIN — METHYLPREDNISOLONE SODIUM SUCCINATE 62.5 MG: 125 INJECTION, POWDER, FOR SOLUTION INTRAMUSCULAR; INTRAVENOUS at 18:18

## 2021-01-01 RX ADMIN — OLANZAPINE 5 MG: 10 INJECTION, POWDER, FOR SOLUTION INTRAMUSCULAR at 20:34

## 2021-01-01 RX ADMIN — PIPERACILLIN SODIUM AND TAZOBACTAM SODIUM 4.5 G: 4; .5 INJECTION, POWDER, LYOPHILIZED, FOR SOLUTION INTRAVENOUS at 23:39

## 2021-01-01 RX ADMIN — UMECLIDINIUM 1 PUFF: 62.5 AEROSOL, POWDER ORAL at 07:25

## 2021-01-01 RX ADMIN — CEPHALEXIN 500 MG: 250 POWDER, FOR SUSPENSION ORAL at 14:20

## 2021-01-01 RX ADMIN — UMECLIDINIUM 1 PUFF: 62.5 AEROSOL, POWDER ORAL at 11:18

## 2021-01-01 RX ADMIN — IPRATROPIUM BROMIDE AND ALBUTEROL SULFATE 3 ML: .5; 3 SOLUTION RESPIRATORY (INHALATION) at 10:08

## 2021-01-01 RX ADMIN — ACETAMINOPHEN 975 MG: 325 TABLET, FILM COATED ORAL at 08:00

## 2021-01-01 RX ADMIN — ACETAMINOPHEN 1000 MG: 500 TABLET, FILM COATED ORAL at 21:05

## 2021-01-01 RX ADMIN — MAGNESIUM SULFATE HEPTAHYDRATE 2 G: 40 INJECTION, SOLUTION INTRAVENOUS at 13:24

## 2021-01-01 RX ADMIN — SODIUM CHLORIDE 500 ML: 9 INJECTION, SOLUTION INTRAVENOUS at 10:58

## 2021-01-01 RX ADMIN — UMECLIDINIUM 1 PUFF: 62.5 AEROSOL, POWDER ORAL at 07:20

## 2021-01-01 RX ADMIN — ACETAMINOPHEN 975 MG: 325 TABLET, FILM COATED ORAL at 16:52

## 2021-01-01 RX ADMIN — METOPROLOL TARTRATE 25 MG: 25 TABLET, FILM COATED ORAL at 06:41

## 2021-01-01 RX ADMIN — METHYLPREDNISOLONE SODIUM SUCCINATE 40 MG: 40 INJECTION, POWDER, FOR SOLUTION INTRAMUSCULAR; INTRAVENOUS at 20:46

## 2021-01-01 RX ADMIN — IPRATROPIUM BROMIDE AND ALBUTEROL SULFATE 3 ML: .5; 3 SOLUTION RESPIRATORY (INHALATION) at 15:44

## 2021-01-01 RX ADMIN — LEVALBUTEROL HYDROCHLORIDE 1.25 MG: 1.25 SOLUTION, CONCENTRATE RESPIRATORY (INHALATION) at 20:22

## 2021-01-01 RX ADMIN — IPRATROPIUM BROMIDE AND ALBUTEROL 1 PUFF: 20; 100 SPRAY, METERED RESPIRATORY (INHALATION) at 02:11

## 2021-01-01 RX ADMIN — DILTIAZEM HYDROCHLORIDE 120 MG: 120 CAPSULE, COATED, EXTENDED RELEASE ORAL at 10:28

## 2021-01-01 RX ADMIN — DILTIAZEM HYDROCHLORIDE 15 MG: 30 TABLET, FILM COATED ORAL at 16:52

## 2021-01-01 RX ADMIN — IPRATROPIUM BROMIDE AND ALBUTEROL SULFATE 3 ML: .5; 3 SOLUTION RESPIRATORY (INHALATION) at 12:10

## 2021-01-01 RX ADMIN — METHYLPREDNISOLONE SODIUM SUCCINATE 62.5 MG: 125 INJECTION, POWDER, FOR SOLUTION INTRAMUSCULAR; INTRAVENOUS at 06:11

## 2021-01-01 RX ADMIN — OLANZAPINE 5 MG: 5 TABLET, ORALLY DISINTEGRATING ORAL at 20:35

## 2021-01-01 RX ADMIN — PIPERACILLIN SODIUM AND TAZOBACTAM SODIUM 4.5 G: 4; .5 INJECTION, POWDER, LYOPHILIZED, FOR SOLUTION INTRAVENOUS at 19:16

## 2021-01-01 RX ADMIN — PIPERACILLIN SODIUM AND TAZOBACTAM SODIUM 3.38 G: 3; .375 INJECTION, POWDER, LYOPHILIZED, FOR SOLUTION INTRAVENOUS at 04:55

## 2021-01-01 RX ADMIN — PIPERACILLIN SODIUM AND TAZOBACTAM SODIUM 3.38 G: 3; .375 INJECTION, POWDER, LYOPHILIZED, FOR SOLUTION INTRAVENOUS at 17:31

## 2021-01-01 RX ADMIN — IPRATROPIUM BROMIDE AND ALBUTEROL SULFATE 3 ML: .5; 3 SOLUTION RESPIRATORY (INHALATION) at 18:24

## 2021-01-01 RX ADMIN — VENLAFAXINE HYDROCHLORIDE 75 MG: 75 CAPSULE, EXTENDED RELEASE ORAL at 09:27

## 2021-01-01 RX ADMIN — PREDNISONE 40 MG: 20 TABLET ORAL at 13:16

## 2021-01-01 RX ADMIN — METHYLPREDNISOLONE SODIUM SUCCINATE 62.5 MG: 125 INJECTION, POWDER, FOR SOLUTION INTRAMUSCULAR; INTRAVENOUS at 12:24

## 2021-01-01 RX ADMIN — METHYLPREDNISOLONE SODIUM SUCCINATE 62.5 MG: 125 INJECTION, POWDER, FOR SOLUTION INTRAMUSCULAR; INTRAVENOUS at 23:39

## 2021-01-01 RX ADMIN — PIPERACILLIN SODIUM AND TAZOBACTAM SODIUM 3.38 G: 3; .375 INJECTION, POWDER, LYOPHILIZED, FOR SOLUTION INTRAVENOUS at 22:18

## 2021-01-01 RX ADMIN — ACETAMINOPHEN 975 MG: 325 TABLET, FILM COATED ORAL at 00:37

## 2021-01-01 RX ADMIN — IPRATROPIUM BROMIDE AND ALBUTEROL SULFATE 3 ML: .5; 3 SOLUTION RESPIRATORY (INHALATION) at 08:42

## 2021-01-01 RX ADMIN — PIPERACILLIN SODIUM AND TAZOBACTAM SODIUM 3.38 G: 3; .375 INJECTION, POWDER, LYOPHILIZED, FOR SOLUTION INTRAVENOUS at 06:34

## 2021-01-01 RX ADMIN — LORAZEPAM 0.5 MG: 2 INJECTION INTRAMUSCULAR; INTRAVENOUS at 10:40

## 2021-01-01 RX ADMIN — ACETAMINOPHEN 1000 MG: 500 TABLET, FILM COATED ORAL at 17:31

## 2021-01-01 RX ADMIN — CEPHALEXIN 500 MG: 250 POWDER, FOR SUSPENSION ORAL at 09:27

## 2021-01-01 RX ADMIN — PREDNISONE 60 MG: 50 TABLET ORAL at 09:22

## 2021-01-01 RX ADMIN — PIPERACILLIN SODIUM AND TAZOBACTAM SODIUM 3.38 G: 3; .375 INJECTION, POWDER, LYOPHILIZED, FOR SOLUTION INTRAVENOUS at 11:16

## 2021-01-01 RX ADMIN — QUETIAPINE FUMARATE 25 MG: 25 TABLET ORAL at 17:29

## 2021-01-01 RX ADMIN — SENNOSIDES AND DOCUSATE SODIUM 1 TABLET: 50; 8.6 TABLET ORAL at 09:28

## 2021-01-01 RX ADMIN — ACETAMINOPHEN 1000 MG: 500 TABLET, FILM COATED ORAL at 09:28

## 2021-01-01 RX ADMIN — IPRATROPIUM BROMIDE 0.5 MG: 0.5 SOLUTION RESPIRATORY (INHALATION) at 20:22

## 2021-01-01 RX ADMIN — HALOPERIDOL LACTATE 2 MG: 5 INJECTION, SOLUTION INTRAMUSCULAR at 13:17

## 2021-01-01 RX ADMIN — DILTIAZEM HYDROCHLORIDE 120 MG: 120 CAPSULE, COATED, EXTENDED RELEASE ORAL at 10:01

## 2021-01-01 RX ADMIN — IPRATROPIUM BROMIDE AND ALBUTEROL SULFATE 3 ML: .5; 3 SOLUTION RESPIRATORY (INHALATION) at 20:07

## 2021-01-01 RX ADMIN — IPRATROPIUM BROMIDE AND ALBUTEROL SULFATE 3 ML: .5; 3 SOLUTION RESPIRATORY (INHALATION) at 12:32

## 2021-01-01 RX ADMIN — ACETAMINOPHEN 1000 MG: 500 TABLET, FILM COATED ORAL at 15:57

## 2021-01-01 RX ADMIN — METHYLPREDNISOLONE SODIUM SUCCINATE 62.5 MG: 125 INJECTION, POWDER, FOR SOLUTION INTRAMUSCULAR; INTRAVENOUS at 00:48

## 2021-01-01 RX ADMIN — AZITHROMYCIN MONOHYDRATE 250 MG: 250 TABLET ORAL at 08:00

## 2021-01-01 RX ADMIN — PIPERACILLIN SODIUM AND TAZOBACTAM SODIUM 3.38 G: 3; .375 INJECTION, POWDER, LYOPHILIZED, FOR SOLUTION INTRAVENOUS at 16:46

## 2021-01-01 RX ADMIN — ENOXAPARIN SODIUM 40 MG: 40 INJECTION SUBCUTANEOUS at 15:56

## 2021-01-01 RX ADMIN — SENNOSIDES AND DOCUSATE SODIUM 1 TABLET: 50; 8.6 TABLET ORAL at 10:28

## 2021-01-01 RX ADMIN — ACETAMINOPHEN 975 MG: 325 TABLET, FILM COATED ORAL at 16:57

## 2021-01-01 RX ADMIN — ENOXAPARIN SODIUM 30 MG: 30 INJECTION SUBCUTANEOUS at 21:07

## 2021-01-01 RX ADMIN — IPRATROPIUM BROMIDE AND ALBUTEROL SULFATE 3 ML: .5; 3 SOLUTION RESPIRATORY (INHALATION) at 11:26

## 2021-01-01 RX ADMIN — ACETAMINOPHEN 975 MG: 325 TABLET, FILM COATED ORAL at 01:51

## 2021-01-01 RX ADMIN — METHYLPREDNISOLONE SODIUM SUCCINATE 62.5 MG: 125 INJECTION, POWDER, FOR SOLUTION INTRAMUSCULAR; INTRAVENOUS at 06:34

## 2021-01-01 RX ADMIN — DILTIAZEM HYDROCHLORIDE 15 MG: 30 TABLET, FILM COATED ORAL at 13:22

## 2021-01-01 RX ADMIN — CEPHALEXIN 500 MG: 250 POWDER, FOR SUSPENSION ORAL at 21:30

## 2021-01-01 RX ADMIN — IPRATROPIUM BROMIDE AND ALBUTEROL SULFATE 3 ML: .5; 3 SOLUTION RESPIRATORY (INHALATION) at 16:45

## 2021-01-01 RX ADMIN — UMECLIDINIUM 1 PUFF: 62.5 AEROSOL, POWDER ORAL at 09:46

## 2021-01-01 RX ADMIN — ACETAMINOPHEN 1000 MG: 500 TABLET, FILM COATED ORAL at 16:08

## 2021-01-01 RX ADMIN — PIPERACILLIN SODIUM AND TAZOBACTAM SODIUM 4.5 G: 4; .5 INJECTION, POWDER, LYOPHILIZED, FOR SOLUTION INTRAVENOUS at 06:11

## 2021-01-01 RX ADMIN — DILTIAZEM HYDROCHLORIDE 15 MG: 30 TABLET, FILM COATED ORAL at 20:51

## 2021-01-01 RX ADMIN — AZITHROMYCIN MONOHYDRATE 500 MG: 500 INJECTION, POWDER, LYOPHILIZED, FOR SOLUTION INTRAVENOUS at 15:15

## 2021-01-01 RX ADMIN — UMECLIDINIUM 1 PUFF: 62.5 AEROSOL, POWDER ORAL at 10:29

## 2021-01-01 RX ADMIN — ACETAMINOPHEN 975 MG: 325 TABLET, FILM COATED ORAL at 08:39

## 2021-01-01 RX ADMIN — ENOXAPARIN SODIUM 40 MG: 40 INJECTION SUBCUTANEOUS at 20:38

## 2021-01-01 RX ADMIN — DEXTROSE AND SODIUM CHLORIDE: 5; 900 INJECTION, SOLUTION INTRAVENOUS at 17:03

## 2021-01-01 RX ADMIN — AZITHROMYCIN MONOHYDRATE 250 MG: 250 TABLET ORAL at 09:22

## 2021-01-01 RX ADMIN — CEFAZOLIN SODIUM 2 G: 2 INJECTION, SOLUTION INTRAVENOUS at 14:19

## 2021-01-01 RX ADMIN — PIPERACILLIN SODIUM AND TAZOBACTAM SODIUM 3.38 G: 3; .375 INJECTION, POWDER, LYOPHILIZED, FOR SOLUTION INTRAVENOUS at 12:25

## 2021-01-01 RX ADMIN — CEPHALEXIN 500 MG: 250 POWDER, FOR SUSPENSION ORAL at 20:52

## 2021-01-01 RX ADMIN — UMECLIDINIUM 1 PUFF: 62.5 AEROSOL, POWDER ORAL at 20:58

## 2021-01-01 RX ADMIN — PIPERACILLIN SODIUM AND TAZOBACTAM SODIUM 4.5 G: 4; .5 INJECTION, POWDER, LYOPHILIZED, FOR SOLUTION INTRAVENOUS at 11:35

## 2021-01-01 RX ADMIN — DILTIAZEM HYDROCHLORIDE 120 MG: 120 CAPSULE, COATED, EXTENDED RELEASE ORAL at 09:28

## 2021-01-01 RX ADMIN — IPRATROPIUM BROMIDE AND ALBUTEROL SULFATE 3 ML: .5; 3 SOLUTION RESPIRATORY (INHALATION) at 20:14

## 2021-01-01 RX ADMIN — METHYLPREDNISOLONE SODIUM SUCCINATE 125 MG: 125 INJECTION, POWDER, FOR SOLUTION INTRAMUSCULAR; INTRAVENOUS at 10:04

## 2021-01-01 RX ADMIN — ENOXAPARIN SODIUM 40 MG: 40 INJECTION SUBCUTANEOUS at 20:33

## 2021-01-01 RX ADMIN — PREDNISONE 60 MG: 50 TABLET ORAL at 08:39

## 2021-01-01 RX ADMIN — IPRATROPIUM BROMIDE AND ALBUTEROL SULFATE 3 ML: .5; 3 SOLUTION RESPIRATORY (INHALATION) at 16:47

## 2021-01-01 RX ADMIN — ACETAMINOPHEN 975 MG: 325 TABLET, FILM COATED ORAL at 09:22

## 2021-01-01 RX ADMIN — DILTIAZEM HYDROCHLORIDE 120 MG: 120 CAPSULE, COATED, EXTENDED RELEASE ORAL at 08:38

## 2021-01-01 RX ADMIN — SODIUM CHLORIDE 500 ML: 9 INJECTION, SOLUTION INTRAVENOUS at 17:47

## 2021-01-01 RX ADMIN — METHYLPREDNISOLONE SODIUM SUCCINATE 40 MG: 40 INJECTION, POWDER, FOR SOLUTION INTRAMUSCULAR; INTRAVENOUS at 07:59

## 2021-01-01 RX ADMIN — SENNOSIDES AND DOCUSATE SODIUM 1 TABLET: 50; 8.6 TABLET ORAL at 10:01

## 2021-01-01 RX ADMIN — WATER 10 ML: 1 INJECTION INTRAMUSCULAR; INTRAVENOUS; SUBCUTANEOUS at 20:33

## 2021-01-01 RX ADMIN — METHYLPREDNISOLONE SODIUM SUCCINATE 62.5 MG: 125 INJECTION, POWDER, FOR SOLUTION INTRAMUSCULAR; INTRAVENOUS at 10:59

## 2021-01-01 RX ADMIN — PIPERACILLIN SODIUM AND TAZOBACTAM SODIUM 3.38 G: 3; .375 INJECTION, POWDER, LYOPHILIZED, FOR SOLUTION INTRAVENOUS at 18:19

## 2021-01-01 RX ADMIN — ENOXAPARIN SODIUM 30 MG: 30 INJECTION SUBCUTANEOUS at 21:59

## 2021-01-01 RX ADMIN — CEPHALEXIN 500 MG: 250 POWDER, FOR SUSPENSION ORAL at 13:45

## 2021-01-01 RX ADMIN — ENOXAPARIN SODIUM 40 MG: 40 INJECTION SUBCUTANEOUS at 16:58

## 2021-01-01 RX ADMIN — IPRATROPIUM BROMIDE AND ALBUTEROL 1 PUFF: 20; 100 SPRAY, METERED RESPIRATORY (INHALATION) at 07:20

## 2021-01-01 RX ADMIN — ACETAMINOPHEN 1000 MG: 500 TABLET, FILM COATED ORAL at 10:28

## 2021-01-01 RX ADMIN — CEPHALEXIN 500 MG: 250 POWDER, FOR SUSPENSION ORAL at 08:41

## 2021-01-01 RX ADMIN — CEPHALEXIN 500 MG: 250 POWDER, FOR SUSPENSION ORAL at 20:48

## 2021-01-01 RX ADMIN — AZITHROMYCIN MONOHYDRATE 250 MG: 250 TABLET ORAL at 08:39

## 2021-01-01 RX ADMIN — FLUTICASONE FUROATE AND VILANTEROL TRIFENATATE 1 PUFF: 100; 25 POWDER RESPIRATORY (INHALATION) at 07:24

## 2021-01-01 ASSESSMENT — ACTIVITIES OF DAILY LIVING (ADL)
ADLS_ACUITY_SCORE: 19
ADLS_ACUITY_SCORE: 13
ADLS_ACUITY_SCORE: 20
ADLS_ACUITY_SCORE: 14
ADLS_ACUITY_SCORE: 12
ADLS_ACUITY_SCORE: 9
ADLS_ACUITY_SCORE: 15
TOILETING_ISSUES: YES
DIFFICULTY_EATING/SWALLOWING: NO
ADLS_ACUITY_SCORE: 20
ADLS_ACUITY_SCORE: 15
ADLS_ACUITY_SCORE: 15
ADLS_ACUITY_SCORE: 14
ADLS_ACUITY_SCORE: 13
ADLS_ACUITY_SCORE: 20
ADLS_ACUITY_SCORE: 15
ADLS_ACUITY_SCORE: 14
ADLS_ACUITY_SCORE: 9
ADLS_ACUITY_SCORE: 13
ADLS_ACUITY_SCORE: 9
ADLS_ACUITY_SCORE: 11
ADLS_ACUITY_SCORE: 12
ADLS_ACUITY_SCORE: 15
DEPENDENT_IADLS:: SHOPPING;TRANSPORTATION
ADLS_ACUITY_SCORE: 20
ADLS_ACUITY_SCORE: 14
WALKING_OR_CLIMBING_STAIRS: AMBULATION DIFFICULTY, REQUIRES EQUIPMENT
ADLS_ACUITY_SCORE: 14
ADLS_ACUITY_SCORE: 20
ADLS_ACUITY_SCORE: 15
ADLS_ACUITY_SCORE: 9
DRESSING/BATHING_DIFFICULTY: YES
ADLS_ACUITY_SCORE: 12
ADLS_ACUITY_SCORE: 9
ADLS_ACUITY_SCORE: 19
ADLS_ACUITY_SCORE: 20
ADLS_ACUITY_SCORE: 15
PREVIOUS_RESPONSIBILITIES: MEDICATION MANAGEMENT
ADLS_ACUITY_SCORE: 15
ADLS_ACUITY_SCORE: 12
ADLS_ACUITY_SCORE: 20
ADLS_ACUITY_SCORE: 14
ADLS_ACUITY_SCORE: 15
ADLS_ACUITY_SCORE: 20
ADLS_ACUITY_SCORE: 12
ADLS_ACUITY_SCORE: 20
ADLS_ACUITY_SCORE: 15
ADLS_ACUITY_SCORE: 20
ADLS_ACUITY_SCORE: 12
ADLS_ACUITY_SCORE: 17
ADLS_ACUITY_SCORE: 12
ADLS_ACUITY_SCORE: 14
DOING_ERRANDS_INDEPENDENTLY_DIFFICULTY: NO
ADLS_ACUITY_SCORE: 12
ADLS_ACUITY_SCORE: 15
DRESSING/BATHING: BATHING DIFFICULTY, REQUIRES EQUIPMENT
FALL_HISTORY_WITHIN_LAST_SIX_MONTHS: NO
ADLS_ACUITY_SCORE: 20
ADLS_ACUITY_SCORE: 15
ADLS_ACUITY_SCORE: 14
TOILETING_MANAGEMENT: INCONTINENCE
ADLS_ACUITY_SCORE: 15
ADLS_ACUITY_SCORE: 15
ADLS_ACUITY_SCORE: 12
ADLS_ACUITY_SCORE: 15
ADLS_ACUITY_SCORE: 16
ADLS_ACUITY_SCORE: 20
ADLS_ACUITY_SCORE: 15
ADLS_ACUITY_SCORE: 9
ADLS_ACUITY_SCORE: 12
TOILETING_ASSISTANCE: TOILETING DIFFICULTY, REQUIRES EQUIPMENT
ADLS_ACUITY_SCORE: 20
ADLS_ACUITY_SCORE: 12
ADLS_ACUITY_SCORE: 20
ADLS_ACUITY_SCORE: 19
ADLS_ACUITY_SCORE: 15
ADLS_ACUITY_SCORE: 14
ADLS_ACUITY_SCORE: 9
ADLS_ACUITY_SCORE: 12
ADLS_ACUITY_SCORE: 15
ADLS_ACUITY_SCORE: 11
ADLS_ACUITY_SCORE: 20
ADLS_ACUITY_SCORE: 15
ADLS_ACUITY_SCORE: 20
ADLS_ACUITY_SCORE: 12
ADLS_ACUITY_SCORE: 12
ADLS_ACUITY_SCORE: 11
ADLS_ACUITY_SCORE: 12
ADLS_ACUITY_SCORE: 19
ADLS_ACUITY_SCORE: 13
ADLS_ACUITY_SCORE: 15
PATIENT_/_FAMILY_COMMUNICATION_STYLE: SPOKEN LANGUAGE (ENGLISH OR BILINGUAL)
ADLS_ACUITY_SCORE: 15
ADLS_ACUITY_SCORE: 11
ADLS_ACUITY_SCORE: 12
ADLS_ACUITY_SCORE: 20
ADLS_ACUITY_SCORE: 20
ADLS_ACUITY_SCORE: 11
ADLS_ACUITY_SCORE: 15
ADLS_ACUITY_SCORE: 15
CONCENTRATING,_REMEMBERING_OR_MAKING_DECISIONS_DIFFICULTY: NO
ADLS_ACUITY_SCORE: 11
ADLS_ACUITY_SCORE: 12
ADLS_ACUITY_SCORE: 12
ADLS_ACUITY_SCORE: 19
ADLS_ACUITY_SCORE: 15
ADLS_ACUITY_SCORE: 15
ADLS_ACUITY_SCORE: 19
ADLS_ACUITY_SCORE: 20
ADLS_ACUITY_SCORE: 20
ADLS_ACUITY_SCORE: 19
ADLS_ACUITY_SCORE: 11
ADLS_ACUITY_SCORE: 20
ADLS_ACUITY_SCORE: 15
ADLS_ACUITY_SCORE: 19
ADLS_ACUITY_SCORE: 20
ADLS_ACUITY_SCORE: 12
ADLS_ACUITY_SCORE: 20
ADLS_ACUITY_SCORE: 20
ADLS_ACUITY_SCORE: 14
ADLS_ACUITY_SCORE: 15
ADLS_ACUITY_SCORE: 19
ADLS_ACUITY_SCORE: 15
ADLS_ACUITY_SCORE: 11
ADLS_ACUITY_SCORE: 19
HEARING_DIFFICULTY_OR_DEAF: NO
ADLS_ACUITY_SCORE: 12
WALKING_OR_CLIMBING_STAIRS_DIFFICULTY: YES
ADLS_ACUITY_SCORE: 15
ADLS_ACUITY_SCORE: 13
ADLS_ACUITY_SCORE: 12
ADLS_ACUITY_SCORE: 15
ADLS_ACUITY_SCORE: 12
ADLS_ACUITY_SCORE: 15
ADLS_ACUITY_SCORE: 16
ADLS_ACUITY_SCORE: 12
ADLS_ACUITY_SCORE: 13
ADLS_ACUITY_SCORE: 12
ADLS_ACUITY_SCORE: 19
DEPENDENT_IADLS:: SHOPPING;TRANSPORTATION
ADLS_ACUITY_SCORE: 20
ADLS_ACUITY_SCORE: 14
ADLS_ACUITY_SCORE: 20
ADLS_ACUITY_SCORE: 19
ADLS_ACUITY_SCORE: 17
WEAR_GLASSES_OR_BLIND: YES
ADLS_ACUITY_SCORE: 9
ADLS_ACUITY_SCORE: 9
ADLS_ACUITY_SCORE: 12
ADLS_ACUITY_SCORE: 16
ADLS_ACUITY_SCORE: 20
ADLS_ACUITY_SCORE: 12
ADLS_ACUITY_SCORE: 20
ADLS_ACUITY_SCORE: 20
ADLS_ACUITY_SCORE: 16
ADLS_ACUITY_SCORE: 12
ADLS_ACUITY_SCORE: 19
ADLS_ACUITY_SCORE: 15
ADLS_ACUITY_SCORE: 20
ADLS_ACUITY_SCORE: 12
ADLS_ACUITY_SCORE: 12
DIFFICULTY_COMMUNICATING: NO
EQUIPMENT_CURRENTLY_USED_AT_HOME: CANE, STRAIGHT;WALKER, STANDARD;WHEELCHAIR, MANUAL
ADLS_ACUITY_SCORE: 20
ADLS_ACUITY_SCORE: 15
ADLS_ACUITY_SCORE: 14
ADLS_ACUITY_SCORE: 12
ADLS_ACUITY_SCORE: 16
ADLS_ACUITY_SCORE: 19
ADLS_ACUITY_SCORE: 17
ADLS_ACUITY_SCORE: 19

## 2021-01-01 ASSESSMENT — ANXIETY QUESTIONNAIRES
GAD7 TOTAL SCORE: 11
5. BEING SO RESTLESS THAT IT IS HARD TO SIT STILL: NOT AT ALL
3. WORRYING TOO MUCH ABOUT DIFFERENT THINGS: MORE THAN HALF THE DAYS
IF YOU CHECKED OFF ANY PROBLEMS ON THIS QUESTIONNAIRE, HOW DIFFICULT HAVE THESE PROBLEMS MADE IT FOR YOU TO DO YOUR WORK, TAKE CARE OF THINGS AT HOME, OR GET ALONG WITH OTHER PEOPLE: SOMEWHAT DIFFICULT
GAD7 TOTAL SCORE: 11
2. NOT BEING ABLE TO STOP OR CONTROL WORRYING: NOT AT ALL
1. FEELING NERVOUS, ANXIOUS, OR ON EDGE: MORE THAN HALF THE DAYS
7. FEELING AFRAID AS IF SOMETHING AWFUL MIGHT HAPPEN: NEARLY EVERY DAY
6. BECOMING EASILY ANNOYED OR IRRITABLE: SEVERAL DAYS

## 2021-01-01 ASSESSMENT — MIFFLIN-ST. JEOR
SCORE: 1027.52
SCORE: 1018.45
SCORE: 1074.13
SCORE: 1038.13

## 2021-01-01 ASSESSMENT — ENCOUNTER SYMPTOMS
WOUND: 1
ARTHRALGIAS: 1
SHORTNESS OF BREATH: 0
NECK PAIN: 0
FEVER: 0
VOMITING: 0
DIARRHEA: 0
BACK PAIN: 0
COUGH: 0

## 2021-01-01 ASSESSMENT — PATIENT HEALTH QUESTIONNAIRE - PHQ9
SUM OF ALL RESPONSES TO PHQ QUESTIONS 1-9: 2
5. POOR APPETITE OR OVEREATING: NEARLY EVERY DAY
SUM OF ALL RESPONSES TO PHQ QUESTIONS 1-9: 13

## 2021-01-26 NOTE — TELEPHONE ENCOUNTER
LOV - vv- 6/12/2020    Please see message below     Please review and advise if okay for home care for baths?     Thank you     Latonya Gomez RN, BSN  [facility]

## 2021-01-27 NOTE — PROGRESS NOTES
Carmen is a 70 year old who is being evaluated via a billable video visit.      How would you like to obtain your AVS? MyChart  If the video visit is dropped, the invitation should be resent by: Send to e-mail at: [email or web address] # [phone]  Will anyone else be joining your video visit? No      Video Start Time: Start: 01/27/2021 03:14 pm   Stop: 01/27/2021 03:47 pm   Assessment & Plan     Chronic respiratory failure with hypoxia, on home oxygen therapy (H)  COPD, severe (H)  Impaired mobility and ADLs  - HOME CARE NURSING REFERRAL  Ongoing care with pulmonology and optimized on triple therapy with home O2. Reporting increasing difficulty with ADLs, especially bathing, and feels she requires further assistance. This serves as face-to-face encounter and orders placed for home-care since this patient is home bound. Encouraged to return for formal in office appointment, but pt wishes to push this out another 6 months given current COVID19 pandemic in hopes of more significant vaccination distribution at that time and that she would be high risk for complication should she contract COVID. Would re-assess labs and cardiac exam. Cardiac cause of worsening dyspnea seems less likely given reports no longer requires her lasix since loosing weight/eating more healthily.     Recurrent major depression in complete remission (H)  Stable and doing well effexor. Can extend until her follow-up visit.      Review of prior external note(s) from - Outside records from [facility]      Return in about 5 months (around 6/27/2021) for chronic condition review/annual wellness.    Vicky Ye PA-C  [facility]     Carmen is a 70 year old who presents to clinic today for the following health issues     HPI   Concern - Would like Home Care Assistance for Bathing  Significant hx of severe COPD optimized on triple therapy (advair, spiriva, combivent) and home oxygen. Using 3 LPM, but feels  "she can't increase it due to epistaxis.  Sees pulmonology annually.  Feels advair and incruse help keep her in good control and only winds up needing the combivent 4x per week.   Worsening concerns about bathing inciting panic attacks because she can't breathe - even movement of trying to wash herself with moving her arms causes her to feel she has more difficulty breathing, dizziness. Denies syncope or falls. Utilizing bath chair, but to the point she needs assistance as all the \"movement required puts me to the edge\".  Reports she previously was obese and is happy to report she has been practicing healthy lifestyle changes primarily with her diet and is now down to 143 lbs. Since loosing weight noticed significantly less swelling in her legs and as such she no longer feels she needs to use her lasix.   Eating soups, sandwiches, salads. Cut out sweets and cola.   Son prepares all her meals. She does not cook. He comes once weekly and texts her daily.  She does not do any laundry or make her bed.  Can still dress herself, but has to do it \"in stages\" due to her difficulty breathing. Admits she doesn't change her clothes daily - only changes clothes once per week. Trying to minimize laundry and work for her son.   Surghx: colostomy - feels she can manage this without difficulty  Home-bound and doesn't have a car any longer.   Realizes \"my days are numbered\" until she needs to live in assisted living.   Noticing some increased difficulty with memory - 2 weeks ago couldn't remember if she took her inhalers. First time something like this has happened. Previously would only have difficulty remembering a word intermittently. Otherwise, feels she is doing well.    Current Outpatient Medications   Medication     calcium carb-cholecalciferol 600-500 MG-UNIT CAPS     fluticasone-salmeterol (ADVAIR DISKUS) 250-50 MCG/DOSE inhaler     INCRUSE ELLIPTA 62.5 MCG/INH Inhaler     acetaminophen (TYLENOL) 325 MG tablet     COMBIVENT " RESPIMAT  MCG/ACT inhaler     venlafaxine (EFFEXOR-XR) 37.5 MG 24 hr capsule     No current facility-administered medications for this visit.       No Known Allergies    Review of Systems   Constitutional, HEENT, cardiovascular, pulmonary, gi and gu systems are negative, except as otherwise noted.      Objective           Vitals:  No vitals were obtained today due to virtual visit.    Physical Exam   GENERAL: Healthy, alert and no distress  EYES: Eyes grossly normal to inspection.  No discharge or erythema, or obvious scleral/conjunctival abnormalities.  RESP: Nasal cannula in place on home O2. No audible wheeze, cough, or visible cyanosis.  No visible retractions or increased work of breathing.   SKIN: Visible skin clear. No significant rash, abnormal pigmentation or lesions.  NEURO: Cranial nerves grossly intact.  Mentation and speech appropriate for age.  PSYCH: Mentation appears normal, affect normal/bright, judgement and insight intact, normal speech and appearance well-groomed.          Video-Visit Details    Type of service:  Video Visit    Video End Time:Start: 01/27/2021 03:14 pm   Stop: 01/27/2021 03:47 pm     Originating Location (pt. Location): Home    Distant Location (provider location):  [facility]     Platform used for Video Visit: SP3H

## 2021-01-27 NOTE — TELEPHONE ENCOUNTER
Yes I think she would qualify for home care. Please assist in scheduling a face-to-face encounter per Medicare guidelines.  Electronically Signed By: Vicky Ye PA-C

## 2021-01-27 NOTE — TELEPHONE ENCOUNTER
Please see my chart message below as with the phone message from today 1/26/2021    Thank you     Latonya Gomez RN, BSN  [place]

## 2021-01-28 NOTE — TELEPHONE ENCOUNTER
Hernando Moreland,   Excellent, in agreement with orders as noted. She did want it to be known that she requests a female for any bathing/dressing help as well. Thank you,  Electronically Signed By: Vicky Ye PA-C

## 2021-01-28 NOTE — TELEPHONE ENCOUNTER
Beena Ye,  Thank you so much for the referral for this delightful lady.  We have spoken, and she would be OK waiting until Sunday for the first homecare visit.  We would appreciate your response to this message as a verbal order, we will definitely be able to help her out.    SN 1 visit over 5 days to begin on 1\31\21.  Thank you,  Merly Arrington RN, BSN   [facility]  [phone]

## 2021-02-01 NOTE — PROGRESS NOTES
[facility] now requests orders and shares plan of care/discharge summaries for some patients through [employer].  Please REPLY TO THIS MESSAGE OR ROUTE BACK TO THE AUTHOR in order to give authorization for orders when needed.  This is considered a verbal order, you will still receive a faxed copy of orders for signature.  Thank you for your assistance in improving collaboration for our patients.    ORDER  Skilled nursing 2w3, 1w2, 3 as needed visits to evaluate and treat to educate on diseaase process COPD, O2 management, coping skills and mental health assessment/depression status, hygeine, endurance, respiratory assessment, labs as ordered, and medication education.   Physical therapy 1 every 14 day 1 to evaluate and treat to include strenght, mobility, and fall prevention safety. Occupational therapy 1 every 14 day 1 to evaluate and treat to include endurance training, ADL/IADL safety, adaptive techniques, and adaptive equipment needs.   1 every 30 day 1 to assist with community resources, to include financial assistance, meals, and transportation.   Home health aide 2 w 5 to assist with personal cares and bathing safety.    MD SUMMARY/PLAN OF CARE:    KRISTINE Weston is a 70 year old female admitted to [facility] following a referral from clinic for assessment of home related to decline in condition. She has not left her home in general for over a year, and during that time has declined in her condition, in her ability to care for herself and is asking for assistance in home to meet her goal to stay in her home. She has an unsteady gait, not using her walker or cane. She has accommodated herself to count the number of steps that she can manage before her COPD exacerbation causes her to becomes too short of breath and needs longer recovery.   BACKGROUND She has a history of Severe COPD, Pulmonary HTN, venous insufficiency, and has a  colostomy in her left upper abdomen.    ANALYSIS She lives alone in an apartment, son lives nearby and assists her as he is able. She has access to use of a shower chair but needs assist to complete a bath. She needs education and assist to meet her daily care needs. It takes her to greater than 2 minutes to almost 3 minutes to recover her oxygen saturation from 84 percent to 90 percent when she ambulates from the living area into the next room, her bedroom. Breath sounds are scattered rales. She wants to stay in her apartment as long as she can and not go anywhere else. Her goal is that she needs to bathe. She needs someone to help her take a bath, wash her hair, assist her into shower, then onto stool, assist in soaping her body, then assist her to get out, and dry body. The process causes her to become too short of breath and tire too easily.  RECOMMENDATION Skilled nursing visits to evaluate and treat to educate on diseaase process COPD, O2 management, coping skills and mental health assessment/depression status, hygeine, endurance, respiratory assessment, labs as ordered, and medication education. Physical therapy to evaluate and treat to include strenght, mobility, and fall prevention safety. Occupational therpay to evaluate and treat to include endurance training, ADL/IADL safety, adaptive techniques, and adaptive equipment needs. to assist with community resources, to include financial assistance, meals, and transportation. Home health aide to assist with personal cares and bathing safety.

## 2021-02-08 NOTE — TELEPHONE ENCOUNTER
Reason for Call:  Form, our goal is to have forms completed with 72 hours, however, some forms may require a visit or additional information.    Type of letter, form or note:  medical    Who is the form from?: Home care    Where did the form come from: form was faxed in    What clinic location was the form placed at?: [place]    Where the form was placed: Vicky Ye Box/Folder    What number is listed as a contact on the form?: [phone]       Additional comments:     Call taken on 2/8/2021 at 9:38 AM by An Starks

## 2021-02-08 NOTE — TELEPHONE ENCOUNTER
[facility] now requests orders and shares plan of care/discharge summaries for some patients through [employer].  Please REPLY TO THIS MESSAGE OR ROUTE BACK TO THE AUTHOR in order to give authorization for orders when needed.  This is considered a verbal order, you will still receive a faxed copy of orders for signature.  Thank you for your assistance in improving collaboration for our patients.    ORDER  PT 1W1, 4M1 for therex, education, safety and strengthening.      MD SUMMARY/PLAN OF CARE  Pt. presents to PT with overall some LE weakness, good balance, but very very poor activity tolerance, with severe SOB. Tolerates only 15 steps, or a few reps of exercises before she needs to catch up with her breathing.  Pt. will benefit from PT 1W1, 4M1 for therex, education, safety and strengthening.      Tanisha Hale, PT  [facility]  [phone]  [email or web address]

## 2021-02-15 NOTE — TELEPHONE ENCOUNTER
Reason for Call:  Form, our goal is to have forms completed with 72 hours, however, some forms may require a visit or additional information.    Type of letter, form or note:  medical    Who is the form from?: Home care    Where did the form come from: form was faxed in    What clinic location was the form placed at?: [place]    Where the form was placed: Vicky Ye PA-C Box/Folder    What number is listed as a contact on the form?: [phone]    Call taken on 2/15/2021 at 1:23 PM by Gill EDMONDSON

## 2021-02-15 NOTE — TELEPHONE ENCOUNTER
MED RECONCILIATION DONE    Discrepancies:   -acetaminophen (TYLENOL) 325 MG tablet   -Epic: 2 tabs PO BID   -Form: 2 tabs PO TID    Medications on Epic but not Form:   -calcium carb-cholecalciferol 600-500 MG-UNIT CAPS    Form currently on [facility] Desk in [place] RN Folder      Routing to PCP for further review/recommendations/orders.        Toya Riojas RN  [facility]

## 2021-02-17 NOTE — TELEPHONE ENCOUNTER
[facility] now requests orders and shares plan of care/discharge summaries for some patients through [employer].  Please REPLY TO THIS MESSAGE OR ROUTE BACK TO THE AUTHOR in order to give authorization for orders when needed.  This is considered a verbal order, you will still receive a faxed copy of orders for signature.  Thank you for your assistance in improving collaboration for our patients.    ORDER   Social work revisit to assist with education and connection with [facility] programs to meet the needs of patient.  Also to further educate on long term planning.

## 2021-02-23 NOTE — TELEPHONE ENCOUNTER
Reason for Call:  Form, our goal is to have forms completed with 72 hours, however, some forms may require a visit or additional information.    Type of letter, form or note:  medical    Who is the form from?: Home care    Where did the form come from: form was faxed in    What clinic location was the form placed at?: [place]    Where the form was placed: Vicky Ye Box/Folder    What number is listed as a contact on the form?: [phone]       Additional comments: PT 1 week 1, 4 month 1    Call taken on 2/23/2021 at 7:52 AM by Candis Chen

## 2021-02-24 NOTE — TELEPHONE ENCOUNTER
Completed forms faxed back to [facility]  at [phone].   Originals sent to be scanned.       Fadumo Rosenbaum

## 2021-03-01 NOTE — TELEPHONE ENCOUNTER
Detail Level: Zone LOV: 1/27/2021    Routing refill request to provider for review/approval because:      BP Readings from Last 3 Encounters:   01/11/20 (!) 142/89   04/21/19 134/72   02/04/19 104/72     PHQ 3/4/2019 11/5/2019 6/12/2020   PHQ-9 Total Score 0 0 0   Q9: Thoughts of better off dead/self-harm past 2 weeks Not at all Not at all Not at all     Creatinine   Date Value Ref Range Status   01/11/2020 0.60 0.52 - 1.04 mg/dL Final       Toya Riojas RN  [facility]         Quality 110: Preventive Care And Screening: Influenza Immunization: Influenza Immunization previously received during influenza season Quality 111:Pneumonia Vaccination Status For Older Adults: Pneumococcal Vaccination Previously Received

## 2021-03-03 NOTE — TELEPHONE ENCOUNTER
We did discuss these concerns at her visit at end of January. I would really recommend consideration to therapy given all of the stressors she has going on. I placed a referral for same for her. Given she had done well previously it's likely that her worsening mood is secondary to all she has going on. That being said, we could consider a dose adjustment if she wishes and I would recommend a follow-up visit with me if that's the case. Ok to do virtually.  Electronically Signed By: Vicky Ye PA-C

## 2021-03-03 NOTE — TELEPHONE ENCOUNTER
Attempt #1  Called patient @ [phone] - Left a non-detailed message to call back and speak with any triage nurse.      Toya Riojas RN  [facility]

## 2021-03-03 NOTE — TELEPHONE ENCOUNTER
[facility] now requests orders and shares plan of care/discharge summaries for some patients through [employer].  Please REPLY TO THIS MESSAGE OR ROUTE BACK TO THE AUTHOR in order to give authorization for orders when needed.  This is considered a verbal order, you will still receive a faxed copy of orders for signature.  Thank you for your assistance in improving collaboration for our patients.    ADDITIONAL ORDERS  SN  2 month 1, 1 as needed       To continue to assess/educate on symptoms/disease management, MH/anxiety/coping status/support of transition to prison setting possibly, nutrition, and home/falls safety measure compliance.     Paradise Almaraz, RN   [phone]  [email or web address]

## 2021-03-03 NOTE — TELEPHONE ENCOUNTER
Pt called, discussed BP, Pt noted she has home care 3 times a week or more and does receive BP checks. Pt noted last BP was 120/60 yesterday.    Pt and writer did perform questionnaires, Pt noted positive PHQ Question 9. Pt denies plan, Noted she is a [employer] and would never hurt self as god will take her when needed. Pt was struggling at this time with health and ability to remain at home, contemplating needing vs wanting to go to assisted living.     Routing to PCP to review and advise. Medication sent in d/t pt being almost out. Pt advised may need adjustment in medication d/t recent scores. Patient stated an understanding and agreed with plan.    Mike CLAUDIO RN   [facility] Triage

## 2021-03-04 NOTE — TELEPHONE ENCOUNTER
Called patient @ # below -     Advised of PCP recommendation below -  Patient stated she would like to try increasing the dose of venlafaxine and recheck with PCP.     Routing to PCP for further review/recommendations/orders.  Please advise on increased dose and time frame for Follow-up (4 weeks?)      Toya Riojas RN  [facility]

## 2021-03-05 NOTE — TELEPHONE ENCOUNTER
Reason for Call:  Form, our goal is to have forms completed with 72 hours, however, some forms may require a visit or additional information.    Type of letter, form or note:  medical    Who is the form from?: Home care    Where did the form come from: form was faxed in    What clinic location was the form placed at?: [place]    Where the form was placed: Vicky Ye PA-C. Box/Folder    What number is listed as a contact on the form?: [phone]       Additional comments: SN 2 Month 1, 1 as needed    Call taken on 3/5/2021 at 9:43 AM by Gill EDMONDSON

## 2021-03-05 NOTE — TELEPHONE ENCOUNTER
Dose increased to 75mg of venlafaxine and advised recheck in 4-6 weeks. Would like home care RN to check her BP a few times before next follow-up to ensure stays WNL as well. Ok to visit virtually.   Electronically Signed By: Vicky Ye PA-C

## 2021-03-05 NOTE — TELEPHONE ENCOUNTER
Reason for Call:  Form, our goal is to have forms completed with 72 hours, however, some forms may require a visit or additional information.    Type of letter, form or note:  medical    Who is the form from?: Home care    Where did the form come from: form was faxed in    What clinic location was the form placed at?: [place]    Where the form was placed: Vicky Ye Box/Folder    What number is listed as a contact on the form?: [phone]       Additional comments: SW 1 month 1    Call taken on 3/5/2021 at 7:19 AM by Candis Chen

## 2021-03-08 NOTE — TELEPHONE ENCOUNTER
Reason for Call:  Form, our goal is to have forms completed with 72 hours, however, some forms may require a visit or additional information.    Type of letter, form or note:  medical    Who is the form from?: Home care    Where did the form come from: form was faxed in    What clinic location was the form placed at?: [place]    Where the form was placed: Vicky Ye PA-C Box/Folder    What number is listed as a contact on the form?: [phone]       Additional comments: HA 1 Week 2    Call taken on 3/8/2021 at 10:13 AM by Gill EDMONDSON

## 2021-03-11 NOTE — TELEPHONE ENCOUNTER
Called patient @ # below -     Advised of PCP recommendation below - Patient stated an understanding and agreed with plan.      Toya Riojas RN  [facility]

## 2021-03-25 NOTE — TELEPHONE ENCOUNTER
Paradise calling from [facility] requesting verbal orders for HHA x1 tomorrow and SN x1 for re certification. Verbal order given. Advised to fax in written order for signature.    Mike CLAUDIO RN   [facility]

## 2021-03-29 NOTE — TELEPHONE ENCOUNTER
Reason for Call:  Form, our goal is to have forms completed with 72 hours, however, some forms may require a visit or additional information.    Type of letter, form or note:  medical    Who is the form from?: Home care    Where did the form come from: form was faxed in    What clinic location was the form placed at?: [place]    Where the form was placed: YAMIL [facility] Box/Folder    What number is listed as a contact on the form?: [phone]       Additional comments: SN 1 month 1    Call taken on 3/29/2021 at 8:45 AM by Candis Chen

## 2021-04-13 NOTE — TELEPHONE ENCOUNTER
Reason for Call:  Form, our goal is to have forms completed with 72 hours, however, some forms may require a visit or additional information.    Type of letter, form or note:  medical    Who is the form from?: [facility] (if other please explain)    Where did the form come from: form was faxed in    What clinic location was the form placed at?: [place]    Where the form was placed: Vicky Ye Box/Folder    What number is listed as a contact on the form?: [phone]       Additional comments:     Call taken on 4/13/2021 at 3:09 PM by An Starks

## 2021-04-14 NOTE — TELEPHONE ENCOUNTER
Form faxed to Dr Lance Mims at the [facility] at [phone]      Fadumo Rosenbaum

## 2021-04-14 NOTE — TELEPHONE ENCOUNTER
This form should go to this patient's pulmonologist - [facility]. Can we re-fax for their completion?  Electronically Signed By: Vicky Ye PA-C

## 2021-04-15 NOTE — TELEPHONE ENCOUNTER
Reason for Call:  Form, our goal is to have forms completed with 72 hours, however, some forms may require a visit or additional information.    Type of letter, form or note:  medical    Who is the form from?: [place] (if other please explain)    Where did the form come from: form was faxed in    What clinic location was the form placed at?: [place]    Where the form was placed: Vicky Ye Box/Folder    What number is listed as a contact on the form?: Call peri [place] at [phone]       Additional comments: Assisted living through [facility]    Call taken on 4/15/2021 at 2:21 PM by Candis Chen

## 2021-04-15 NOTE — TELEPHONE ENCOUNTER
Form reviewed. Request is for reasonable accomodation for that limits their ability to occupy housing. Can we clarify why they're requesting this? No information was provided.  Perhaps a televisit for form completion would be best?  Electronically Signed By: Vicky Ye PA-C

## 2021-04-19 NOTE — TELEPHONE ENCOUNTER
Called # below     Left a non detailed Vm     Latonya Gomez RN, BSN  [facility]

## 2021-04-20 NOTE — TELEPHONE ENCOUNTER
Called # [phone]     Pt advised of note below. Pt is willing to perform video visit.   Future Appointments   Date Time Provider Department Center   4/26/2021 10:30 AM Vicky Ye PA-C [facility]   6/9/2021  4:00 PM Vicky Ye PA-C [facility]     Pt advised if unable to get logged into [place] will perform telephone visit.      Mike Perkins RN   [facility]

## 2021-04-21 NOTE — TELEPHONE ENCOUNTER
Form faxed back to [facility] at [phone], Vicky does not prescribe her Oxygen  [facility] does.  See notes from Vicky on 4/13 encounter.       Fadumo Rosenbaum        no

## 2021-04-21 NOTE — TELEPHONE ENCOUNTER
Reason for Call:  Form, our goal is to have forms completed with 72 hours, however, some forms may require a visit or additional information.    Type of letter, form or note:  medical    Who is the form from?: [facility] (if other please explain)    Where did the form come from: form was faxed in    What clinic location was the form placed at?: [place]    Where the form was placed: Vicky Ye Box/Folder    What number is listed as a contact on the form?: [phone]           Call taken on 4/21/2021 at 4:42 PM by Carol Le

## 2021-04-26 NOTE — PROGRESS NOTES
Carmen is a 70 year old who is being evaluated via a billable video visit.      How would you like to obtain your AVS? MyChart  If the video visit is dropped, the invitation should be resent by: Text to cell phone: [phone]  Will anyone else be joining your video visit? No    Video Start Time: Start: 04/26/2021 10:26 am  Stop: 04/26/2021 10:51 am    Assessment & Plan     Encounter for completion of form with patient  Form completed for reasonable accommodation per request. Son will return for pick-up.    Adjustment disorder with mixed anxiety and depressed mood  Mild major depression (H)  Stable on increased dose of effexor and doing well. Ok to renew.  - venlafaxine (EFFEXOR-XR) 75 MG 24 hr capsule; Take 1 capsule (75 mg) by mouth daily    Return in about 1 month (around 5/26/2021) for with pulmonolog as planned.    Vicky Ye PA-C  [facility]   Carmen is a 70 year old who presents for the following health issues:    HPI   Still has not yet filled effexor 75mg daily as had leftover 37.5mg so taking two. Would like script changed to 90-day supply though so this was sent for her. Feels this is working well for her, but still reports that situation is very trying right now. Both son and she feel she needs to switch to an assisted living type of situation due to severity of her COPD and need for extra assistance. Social security is all she has though so [facility]. Nothing available currently. Thought she would live in her current home until she passed, but realizes now she needs extra assistance and does not want to be alone anymore either.    Forms to be filled out - needs reasonable accommodation to break her lease earily if housing/assisted living becomes available.  Working with home care to makes some modifications to her current living environment, but have been less helpful than she hoped. Requiring assistance with ADLs including  bathing due to severity of COPD.   Has virtual visit scheduled with pulmonology coming up to reassess things as well, but this is being done virtually too due to COVID19 pandemic.    Current Outpatient Medications   Medication     acetaminophen (TYLENOL) 325 MG tablet     calcium carb-cholecalciferol 600-500 MG-UNIT CAPS     COMBIVENT RESPIMAT  MCG/ACT inhaler     fluticasone-salmeterol (ADVAIR DISKUS) 250-50 MCG/DOSE inhaler     INCRUSE ELLIPTA 62.5 MCG/INH Inhaler     venlafaxine (EFFEXOR-XR) 75 MG 24 hr capsule     No current facility-administered medications for this visit.       No Known Allergies        Review of Systems   Constitutional, HEENT, cardiovascular, pulmonary, gi and gu systems are negative, except as otherwise noted.      Objective    Vitals - Patient Reported  Weight (Patient Reported): 60.8 kg (134 lb)      Vitals:  No vitals were obtained today due to virtual visit.    Physical Exam   GENERAL: Healthy, alert and no distress  EYES: Eyes grossly normal to inspection.  No discharge or erythema, or obvious scleral/conjunctival abnormalities.  RESP: No audible wheeze, cough, or visible cyanosis.  No visible retractions or increased work of breathing. O2 in place via nasal cannula.   SKIN: Visible skin clear. No significant rash, abnormal pigmentation or lesions.  NEURO: Cranial nerves grossly intact.  Mentation and speech appropriate for age.  PSYCH: Mentation appears normal, affect normal/bright, judgement and insight intact, normal speech and appearance well-groomed.          Video-Visit Details    Type of service:  Video Visit    Video End Time:Start: 04/26/2021 10:26 am  Stop: 04/26/2021 10:51 am    Originating Location (pt. Location): Home    Distant Location (provider location):  [facility]     Platform used for Video Visit: Rewarding Return

## 2021-04-28 NOTE — TELEPHONE ENCOUNTER
Form completed and placed in [facility]. Please notify available for pick-up.  Electronically Signed By: Vicky Ye PA-C

## 2021-06-07 NOTE — TELEPHONE ENCOUNTER
Changed visit to virtual visit on Wednesday.   No specific concerns.  She also has an appointment with her pulmonologist on 6/16      Fadumo Rosenbaum

## 2021-06-07 NOTE — TELEPHONE ENCOUNTER
I know we had discussed the next thing she was due for was to see her pulmonologist and so if she was going to pick one appt to be seen in person for I would rather she see them since her breathing is the bulk of her problems. She is overdue to see us for her annual wellness visit, but if she prefers to push this out with COVID19 pandemic or do virtually then that's ok. I know she doesn't come in during the winter months though either so that only really leaves the next few months for her to follow-up with us in person if she wants. I am fine with whatever she is comfortable with. Was there something specific otherwise she had a concern about?  Electronically Signed By: Vicky Ye PA-C

## 2021-06-07 NOTE — TELEPHONE ENCOUNTER
Patient has an in clinic appt for 6/9 but she is wondering if it can be virtual instead?     She has not been out of her apartment for the last 2 years, is not vaccinated and is concerned about coming into the clinic.     Please advise.       Fadumo Rosenbaum

## 2021-06-09 NOTE — PATIENT INSTRUCTIONS
Patient Education   Personalized Prevention Plan  You are due for the preventive services outlined below.  Your care team is available to assist you in scheduling these services.  If you have already completed any of these items, please share that information with your care team to update in your medical record.  Health Maintenance Due   Topic Date Due     ANNUAL REVIEW OF HM ORDERS  Never done     COVID-19 Vaccine (1) Never done     Zoster (Shingles) Vaccine (1 of 2) Never done     Colorectal Cancer Screening  06/12/2016     Osteoporosis Screening  06/23/2017     Mammogram  09/21/2018     Cholesterol Lab  02/04/2020     FALL RISK ASSESSMENT  02/14/2020     Basic Metabolic Panel  07/11/2020     Liver Monitoring Lab  01/11/2021     Complete Blood Count  01/11/2021

## 2021-06-09 NOTE — PROGRESS NOTES
Carmen is a 71 year old who is being evaluated via a billable video visit at her request due to COVID19 pandemic:    How would you like to obtain your AVS? MyChart  If the video visit is dropped, the invitation should be resent by: Text to cell phone: [phone]   Will anyone else be joining your video visit? No    Video Start Time: Start: 06/09/2021 03:37 pm  Stop: 06/09/2021 04:22 pm    Assessment & Plan     Encounter for Medicare annual wellness exam  Reviewed preventative health recommendations for age. Pt declines all cancer screenings including mammogram, colonoscopy.     S/P colostomy (H) - since colonic perforation in 5/2016 due to sigmoid diverticulitis  Declines referral to re-establish care with GI as has no desire for reversal and has no issues. She will follow-up with me should she change or mind of have any concerns.    COPD, severe (H)  Chronic respiratory failure with hypoxia, on home oxygen therapy (H)  Stable on current regimen and has follow-up with pulmonology next week. Encouraged to continue specialist care. Due to worsening ability to complete home ADLs have previously discussed transition to long-term care facility and she and son are in process of this.    Recurrent major depression in complete remission (H)  Stable on effexor. Continue without changes.     Lipid screening  Screening for diabetes mellitus  Declines coming in for any lab work due to current COVID19 pandemic.      Return in about 53 weeks (around 6/15/2022) for Annual Wellness Visit.    Vicky Ye PA-C  [facility]   Carmen is a 71 year old who presents for the following health issues:    HPI   Perforated sigmoid diverticulitis with peritonitis:   ---Laparoscopic converted to Laparotomy Sigmoid colon resection, with end colostomy on 5/18/16. Reports was told she could consider a reversal when she felt 100%, but took her awhile too feel back to her norm and at this point is not  interested in a reversal as has done well since that time.   Discussed routine cancer screenings for age as she is overdue for mammo and colonoscopy and she is not interested in either of these. Will let me know if she has any issues with her colostomy and can always refer back to GI, but declines re-establishing care at this point either.    Depression Follow-Up; effexor 75mg once daily and feels this works well for her.     How are you doing with your depression since your last visit? Improved     Are you having other symptoms that might be associated with depression or anxiety? No    Have you had a significant life event? No     Do you have any concerns with your use of alcohol or other drugs? No    Social History     Tobacco Use     Smoking status: Former Smoker     Packs/day: 0.25     Types: Cigarettes     Quit date: 2012     Years since quittin.0     Smokeless tobacco: Never Used   Substance Use Topics     Alcohol use: No     Alcohol/week: 0.0 standard drinks     Drug use: No     PHQ 2020 3/3/2021 2021   PHQ-9 Total Score 0 13 2   Q9: Thoughts of better off dead/self-harm past 2 weeks Not at all Several days Not at all     THOMPSON-7 SCORE 3/4/2019 2020 3/3/2021   Total Score 1 0 11     Last PHQ-9 2021   1.  Little interest or pleasure in doing things 1   2.  Feeling down, depressed, or hopeless 0   3.  Trouble falling or staying asleep, or sleeping too much 0   4.  Feeling tired or having little energy 0   5.  Poor appetite or overeating 0   6.  Feeling bad about yourself 1   7.  Trouble concentrating 0   8.  Moving slowly or restless 0   Q9: Thoughts of better off dead/self-harm past 2 weeks 0   PHQ-9 Total Score 2   Difficulty at work, home, or with people Not difficult at all       Suicide Assessment Five-step Evaluation and Treatment (SAFE-T)      COPD Follow-Up; combivent 4x daily as needed; feels hasn't had to use it as much recently. Has worked hard on healthy habits and was able  "to loose weight on purpose. Now has been maintaining this and feels as a result of her weight loss hasn't required use of combivent as much.  advair diskus 250-50 1 puff BID, incruse ellipta 62.5mcg/inh 1 puff daily  Has follow-up visit scheduled with pulmonology next week  Continues to have difficulty with bathing so working with [facility] and has a  to see if can get moved to care facility.  Has a life alert that has been approved for her that she plans to start wearing.   Home health care no longer coming out to her house to help with bathing - reports she has \"accepted God's plan\" for herself and feels they're on the right path of getting her to a long-term care facility for extra help. Reports she \"lost confidence\" in having the RN come out to to her house so declines referral back for home healthcare as feels she is doing fine on her own until she moves. Will let me know if anything changes.      Overall, how are your COPD symptoms since your last clinic visit?  Better    How much fatigue or shortness of breath do you have when you are walking?  More than usual    How much shortness of breath do you have when you are resting?  None    How often do you cough? Rarely    Have you noticed any change in your sputum/phlegm?  No    Have you experienced a recent fever? No    Please describe how far you can walk without stopping to rest:  Less than 10 feet    How many flights of stairs are you able to walk up without stopping?  None    Have you had any Emergency Room Visits, Urgent Care Visits, or Hospital Admissions because of your COPD since your last office visit?  No    History   Smoking Status     Former Smoker     Packs/day: 0.25     Types: Cigarettes     Quit date: 6/11/2012   Smokeless Tobacco     Never Used     Lab Results   Component Value Date    FEV1 0.94 07/03/2013    TYX0IVW 33 07/03/2013       Annual Wellness Visit    Patient has been advised of split billing requirements and indicates " "understanding: Yes     Are you in the first 12 months of your Medicare Part B coverage?  No    Physical Health:    In general, how would you rate your overall physical health? fair    Outside of work, how many days during the week do you exercise?none    Outside of work, approximately how many minutes a day do you exercise?not applicable    If you drink alcohol do you typically have >3 drinks per day or >7 drinks per week? No    Do you usually eat at least 4 servings of fruit and vegetables a day, include whole grains & fiber and avoid regularly eating high fat or \"junk\" foods? Yes    Do you have any problems taking medications regularly? YES- remembering which inhalers she's used    Do you have any side effects from medications? not applicable    Needs assistance for the following daily activities: transportation and shopping    Which of the following safety concerns are present in your home?  none identified     Hearing impairment: No    In the past 6 months, have you been bothered by leaking of urine? no    Mental Health:    In general, how would you rate your overall mental or emotional health? fair  PHQ-2 Score:      Do you feel safe in your environment? Yes    Have you ever done Advance Care Planning? (For example, a Health Directive, POLST, or a discussion with a medical provider or your loved ones about your wishes)? Yes, patient states has an Advance Care Planning document and will bring a copy to the clinic.    Fall risk:  Fallen 2 or more times in the past year?: No  Any fall with injury in the past year?: No    Cognitive Screenin) Repeat 3 items (Leader, Season, Table)    2) Clock draw: NORMAL  3) 3 item recall: Recalls 3 objects  Results: 3 items recalled: COGNITIVE IMPAIRMENT LESS LIKELY    Mini-CogTM Copyright DICK Chester. Licensed by the author for use in [facility]; reprinted with permission (meg@.[facility]). All rights reserved.        Current providers sharing in care for this " patient include:   Patient Care Team:  Vicky Ye PA-C as PCP - General (Family Medicine)  Lance Mims MD as MD (Pulmonary Disease)  Levon FIGUEROA  as [facility] Worker  [facility]  as Other (see comments)  Vicky Ye PA-C as Assigned PCP  [facility] ([facility] ([facility]    Patient has been advised of split billing requirements and indicates understanding: No    Review of Systems   Constitutional, HEENT, cardiovascular, pulmonary, GI, , musculoskeletal, neuro, skin, endocrine and psych systems are negative, except as otherwise noted.      Objective           Vitals:  No vitals were obtained today due to virtual visit.    Physical Exam   GENERAL: pleasant 70 yo female NAD with nasal cannula in place on home O2.  EYES: Eyes grossly normal to inspection.  No discharge or erythema, or obvious scleral/conjunctival abnormalities.  RESP: no observed cough or apparent respiratory distress.  SKIN: Visible skin clear. No significant rash, abnormal pigmentation or lesions.  NEURO: Cranial nerves grossly intact.  Mentation and speech appropriate for age.  PSYCH: Mentation appears normal, affect normal/bright, judgement and insight intact, normal speech and appearance well-groomed.            Video-Visit Details    Type of service:  Video Visit    Video End Time: Start: 06/09/2021 03:37 pm  Stop: 06/09/2021 04:22 pm    Originating Location (pt. Location): Home    Distant Location (provider location):  [facility]     Platform used for Video Visit: Taptu

## 2021-08-18 NOTE — TELEPHONE ENCOUNTER
Reason for Call:  Form, our goal is to have forms completed with 72 hours, however, some forms may require a visit or additional information.    Type of letter, form or note:  medical    Who is the form from?: [employer] (if other please explain)    Where did the form come from: form was faxed in    What clinic location was the form placed at?: [facility]    Where the form was placed: Vicky Ye Box/Folder    What number is listed as a contact on the form?: Fax to [phone]       Additional comments: Sandhya Amaro    Call taken on 8/18/2021 at 2:32 PM by Candis Chen

## 2021-08-18 NOTE — TELEPHONE ENCOUNTER
Placed back on your desk, [employer] also needs diagnosis code and number of times per day changing       Fadumo Rosenbaum

## 2021-08-19 NOTE — TELEPHONE ENCOUNTER
Thanks Fadumo! Updated. Placed in [facility].  Electronically Signed By: Vicky Ye PA-C

## 2021-08-25 NOTE — TELEPHONE ENCOUNTER
Reason for Call:  Form, our goal is to have forms completed with 72 hours, however, some forms may require a visit or additional information.    Type of letter, form or note:  medical    Who is the form from?: [employer] (if other please explain)    Where did the form come from: form was faxed in    What clinic location was the form placed at?: [facility]    Where the form was placed: Vicky Ye Box/Folder    What number is listed as a contact on the form?: Fax to [phone]       Additional comments: Signed medical records request    Call taken on 8/25/2021 at 8:52 AM by Candis Chen

## 2021-08-25 NOTE — TELEPHONE ENCOUNTER
Duplicate request, already faxed to them on 8/19.  Will refax to [facility]       Fadumo Rosenbaum

## 2021-08-26 NOTE — TELEPHONE ENCOUNTER
Reason for Call:  Form, our goal is to have forms completed with 72 hours, however, some forms may require a visit or additional information.    Type of letter, form or note:  medical    Who is the form from?: [employer] (if other please explain)    Where did the form come from: form was faxed in    What clinic location was the form placed at?: [facility]    Where the form was placed: Vicky Ye Box/Folder    What number is listed as a contact on the form?: [phone]           Call taken on 8/26/2021 at 5:43 PM by Carol Le

## 2021-10-12 NOTE — TELEPHONE ENCOUNTER
"Requested Prescriptions   Pending Prescriptions Disp Refills     venlafaxine (EFFEXOR-XR) 37.5 MG 24 hr capsule [Pharmacy Med Name: VENLAFAXINE ER 37.5MG CAPSULES]  This may be a duplicate refill request.  Last Written Prescription Date:  11/5/2019  Last Fill Quantity: 90 capsule,  # refills: 0   Last office visit: 2/4/2019 Cassi    Future Office Visit:       90 capsule 0     Sig: TAKE 1 CAPSULE(37.5 MG) BY MOUTH DAILY       Serotonin-Norepinephrine Reuptake Inhibitors  Failed - 11/5/2019  5:34 PM        Failed - PHQ-9 score of less than 5 in past 6 months     Please review last PHQ-9 score.     PHQ-9 SCORE 2/4/2019 3/4/2019 11/5/2019   PHQ-9 Total Score MyChart - - 0   PHQ-9 Total Score 6 0 0     THOMPSON-7 SCORE 10/2/2017 2/4/2019 3/4/2019   Total Score 0 0 1               Failed - Recent (6 mo) or future (30 days) visit within the authorizing provider's specialty     Patient had office visit in the last 6 months or has a visit in the next 30 days with authorizing provider or within the authorizing provider's specialty.  See \"Patient Info\" tab in inbasket, or \"Choose Columns\" in Meds & Orders section of the refill encounter.            Passed - Blood pressure under 140/90 in past 12 months     BP Readings from Last 3 Encounters:   04/21/19 134/72   02/04/19 104/72   12/19/17 123/81                 Passed - Medication is active on med list        Passed - Patient is age 18 or older        Passed - No active pregnancy on record        Passed - Normal serum creatinine on file in past 12 months     Recent Labs   Lab Test 04/21/19  1151   CR 0.69             Passed - No positive pregnancy test in past 12 months        " No

## 2021-12-16 PROBLEM — S62.615B: Status: ACTIVE | Noted: 2021-01-01
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
## 2021-12-16 PROBLEM — J96.22 ACUTE AND CHRONIC RESPIRATORY FAILURE WITH HYPERCAPNIA (H): Status: ACTIVE | Noted: 2021-01-01

## 2021-12-16 PROBLEM — J44.1 COPD EXACERBATION (H): Status: ACTIVE | Noted: 2021-01-01

## 2021-12-16 NOTE — ED PROVIDER NOTES
History     Chief Complaint:  Fall     The history is provided by the patient.      Carmen Perez is an oxygen-dependent 71 year old female with history of severe COPD, asthma, MI, CHF, pulmonary hypertension who presents after a fall which occurred last night. She states that her nasal cannula tubing became caught on a piece of furniture as she was walking and she fell, striking the right side of her head on a dresser. No loss of consciousness occurred. She reports that she was not able to get up immediately after falling, but she was eventually able to get up on her own. She has been ambulatory since the fall but she notes that she intermittently uses a cane, a walker, or a wheelchair at home. Here in the ED, Carmen notes that she has pain to her left hand, primarily in her right 4th digit as well as her bilateral knees. She has a laceration to her left 4th digit and reports that she was able to see bone inside the wound. She does have some bilateral shoulder pain but she states this is not unusual or new. She mentions that she has been feeling increasingly weak recently. She denies neck pain, back pain. Denies recent chest pain, cough, fever, vomiting, diarrhea and reports that she has not left her home in three years as her son brings her groceries and other essential items. Denies recent changes in her ostomy output. Carmen oxygen-dependent and is on 3.5 L via nasal cannula at baseline. She denies recent changes to her breathing and states her breathing is baseline here in the ED. Tdap was most recently updated in 2014.    Review of Systems   Constitutional: Negative for fever.   Respiratory: Negative for cough and shortness of breath.    Cardiovascular: Negative for chest pain.   Gastrointestinal: Negative for diarrhea and vomiting.   Musculoskeletal: Positive for arthralgias. Negative for back pain and neck pain.   Skin: Positive for wound.   All other systems reviewed and are negative.    Allergies:  The  patient has no known allergies.     Medications:  Combivent respimat  Advair diskus  Incruse ellipta  Effexor    Past Medical History:     Asthma  COPD, severe  Cor pulmonale  Hyperkalemia  Depression  MI  CHF  Diverticulitis  Cervical fracture  Tobacco use  Panlobular emphysema  Pulmonary hypertension  Chronic venous insufficiency    Hypercalcemia  Delirium  Pneumonia  Bursitis, right hip  Peritonitis   Rheumatoid arthritis    Past Surgical History:     section  Hernia repair  Colectomy with colostomy  Tubal ligation    Family History:    Mother: coronary artery disease    Social History:  Patient presents to the ED alone.  Patient presents to the ED via EMS.  Patient lives in a condo alone.    Physical Exam     Patient Vitals for the past 24 hrs:   BP Temp Temp src Pulse Resp SpO2   21 1445 122/77 -- -- 120 -- 98 %   21 1430 -- -- -- 118 -- 96 %   21 1415 -- -- -- -- -- 91 %   21 1345 122/88 -- -- 111 -- 91 %   21 1330 -- -- -- 117 -- 91 %   21 1315 129/77 -- -- (!) 124 -- 92 %   21 1300 134/89 -- -- 120 -- 92 %   21 1245 119/75 -- -- 106 -- 94 %   21 1230 -- -- -- 84 -- 93 %   21 1220 112/74 -- -- 111 -- 93 %   21 1130 -- -- -- 110 -- 93 %   21 1115 121/68 -- -- 110 -- 92 %   21 1100 126/74 -- -- 109 -- 92 %   21 1045 (!) 143/73 -- -- 113 -- 94 %   21 1030 -- -- -- 107 -- 93 %   21 1015 (!) 148/80 -- -- 106 -- 100 %   21 1008 -- -- -- (!) 125 23 99 %   21 0915 -- -- -- -- 30 --   21 0910 (!) 165/101 97.8  F (36.6  C) Oral 120 -- 94 %     Physical Exam  VS: Reviewed per above  HENT: Mucous membranes moist, no external signs of trauma.  EYES: sclera anicteric  CV: Rate as noted, regular rhythm.   RESP: Tachypnea, decreased breath sounds bilaterally, pursed lip breathing.  GI: no tenderness/rebound/guarding, not distended.  NEURO: Alert, moving all extremities  MSK: Swelling and bruising of the  left hand.  No pain with passive range of motion of the joints of the other extremities or left shoulder or left elbow or left wrist.  SKIN: Warm and dry, bruising and swelling of the left hand and tenderness about the left ring finger with associated 3 cm laceration of the proximal left ring finger.    Emergency Department Course     ECG:  ECG taken at 0938, ECG read at 0942  Sinus tachycardia  Right atrial enlargement  Left axis deviation  Pulmonary disease pattern  Right bundle branch block   Possible inferior infarct, age undetermined  Abnormal ECG  No significant change as compared to prior, dated 6/2/17.  Rate 113 bpm. SC interval 176 ms. QRS duration 110 ms. QT/QTc 328/449 ms. P-R-T axes 87 -41 61.     Imaging:  Head CT w/o contrast   Final Result   IMPRESSION:    No acute intracranial abnormality.      CARMELINA CHAVEZ MD            SYSTEM ID:  [id number]      XR Hand Port Left G/E 3 Views   Preliminary Result   IMPRESSION: Mildly comminuted fracture of the fourth proximal phalanx,   including 4.5 mm of dorsal displacement. There is apex volar   angulation, and mild impaction as well.      XR Chest Port 1 View   Final Result   IMPRESSION: Hyperexpanded lungs with coarsened interstitial markings   compatible with COPD. No lobar consolidation, pneumothorax, or pleural   effusion.      NICKY BRODERICK MD            SYSTEM ID:  [id number]      Report per radiology    Laboratory:  Labs Ordered and Resulted from Time of ED Arrival to Time of ED Departure   BASIC METABOLIC PANEL - Abnormal       Result Value    Sodium 144      Potassium 4.4      Chloride 99      Carbon Dioxide (CO2) 43 (*)     Anion Gap 2 (*)     Urea Nitrogen 13      Creatinine 0.52      Calcium 9.3      Glucose 111 (*)     GFR Estimate >90     BLOOD GAS VENOUS WITH OXYHEMOGLOBIN - Abnormal    pH Venous 7.24 (*)     pCO2 Venous 114 (*)     pO2 Venous 36      Bicarbonate Venous 48 (*)     FIO2 4      Oxyhemoglobin Venous 59 (*)     Base Excess/Deficit  (+/-) 16.7 (*)    ROUTINE UA WITH MICROSCOPIC REFLEX TO CULTURE - Abnormal    Color Urine Yellow      Appearance Urine Clear      Glucose Urine Negative      Bilirubin Urine Negative      Ketones Urine Trace (*)     Specific Gravity Urine 1.021      Blood Urine Negative      pH Urine 6.0      Protein Albumin Urine 10  (*)     Urobilinogen Urine Normal      Nitrite Urine Negative      Leukocyte Esterase Urine Negative      Mucus Urine Present (*)     RBC Urine 1      WBC Urine <1      Squamous Epithelials Urine <1     CBC WITH PLATELETS AND DIFFERENTIAL - Abnormal    WBC Count 10.9      RBC Count 3.89      Hemoglobin 10.8 (*)     Hematocrit 39.3       (*)     MCH 27.8      MCHC 27.5 (*)     RDW 12.4      Platelet Count 371      % Neutrophils 80      % Lymphocytes 10      % Monocytes 7      % Eosinophils 3      % Basophils 0      % Immature Granulocytes 0      NRBCs per 100 WBC 0      Absolute Neutrophils 8.8 (*)     Absolute Lymphocytes 1.0      Absolute Monocytes 0.7      Absolute Eosinophils 0.3      Absolute Basophils 0.0      Absolute Immature Granulocytes 0.0      Absolute NRBCs 0.0     BLOOD GAS VENOUS WITH OXYHEMOGLOBIN - Abnormal    pH Venous 7.34      pCO2 Venous 83 (*)     pO2 Venous 32      Bicarbonate Venous 44 (*)     FIO2 40      Oxyhemoglobin Venous 57 (*)     Base Excess/Deficit (+/-) 15.4 (*)    ISTAT GASES LACTATE VENOUS POCT - Abnormal    Lactic Acid POCT 0.6      Bicarbonate Venous POCT 47 (*)     O2 Sat, Venous POCT 87 (*)     pCO2V Venous POCT 90 (*)     pH Venous POCT 7.32      pO2 Venous POCT 61 (*)    TROPONIN I - Normal    Troponin I High Sensitivity 12     COVID-19 VIRUS (CORONAVIRUS) BY PCR - Normal    SARS CoV2 PCR Negative        Procedures    Laceration Repair        LACERATION:  A subcutaneous clean 3 cm laceration.      LOCATION:  Left palmar proximal ring finger      FUNCTION:  Spontaneously moves the finger. Limited testing due to pain.      ANESTHESIA:  Digital block using  1% lidocaine total of 2 mLs      PREPARATION:  Irrigation with Normal Saline      DEBRIDEMENT:  No debridement and wound explored, no foreign body found      CLOSURE:  Wound was closed with one layer.  Skin closed with 6 x 4.0 Ethylon using interrupted sutures.      Plaster Ulnar Gutter Splint Placement     Splint was applied and after placement I checked and adjusted the fit to ensure proper positioning. Patient was more comfortable with splint in place. Sensation and circulation are intact after splint placement.    Emergency Department Course:    Reviewed:  I reviewed nursing notes, vitals, past medical history, Care Everywhere, and [facility]    Assessments:  0919 I obtained history and examined the patient as noted above.   1410 I rechecked the patient and explained findings.     Consults:  1142 I spoke with laboratory staff regarding abnormal laboratory results.  1347 I spoke with YAMIL Kennedy from [facility] regarding the patient's presentation and plan of care.    I spoke with Dr. Hobson from the hospitalist service regarding the patient's presentation, findings here in the ED, and plan of care.     Interventions:    Medications   lidocaine (PF) (XYLOCAINE) 1 % injection (  Not Given 12/16/21 1504)   azithromycin 500 mg (ZITHROMAX) in 0.9% NaCl 250 mL intermittent infusion 500 mg (500 mg Intravenous New Bag 12/16/21 1515)   lidocaine 1 % 0.1-1 mL (has no administration in time range)   lidocaine (LMX4) cream (has no administration in time range)   sodium chloride (PF) 0.9% PF flush 3 mL (3 mLs Intracatheter Given 12/16/21 1517)   sodium chloride (PF) 0.9% PF flush 3 mL (has no administration in time range)   melatonin tablet 1 mg (has no administration in time range)   enoxaparin ANTICOAGULANT (LOVENOX) injection 40 mg (40 mg Subcutaneous Given 12/16/21 1556)   acetaminophen (TYLENOL) tablet 975 mg (975 mg Oral Not Given 12/16/21 1506)   ondansetron (ZOFRAN-ODT) ODT tab 4 mg (has no administration in time  range)     Or   ondansetron (ZOFRAN) injection 4 mg (has no administration in time range)   senna-docusate (SENOKOT-S/PERICOLACE) 8.6-50 MG per tablet 1 tablet (has no administration in time range)     Or   senna-docusate (SENOKOT-S/PERICOLACE) 8.6-50 MG per tablet 2 tablet (has no administration in time range)   azithromycin (ZITHROMAX) tablet 250 mg (has no administration in time range)   methylPREDNISolone sodium succinate (solu-MEDROL) injection 40 mg (has no administration in time range)   cephALEXin (KEFLEX) suspension 500 mg (has no administration in time range)   methylPREDNISolone sodium succinate (solu-MEDROL) injection 125 mg (125 mg Intravenous Given 12/16/21 1004)   ipratropium - albuterol 0.5 mg/2.5 mg/3 mL (DUONEB) neb solution 3 mL (3 mLs Nebulization Given 12/16/21 1008)   LORazepam (ATIVAN) 2 MG/ML injection (0.5 mg  Given 12/16/21 1040)   ceFAZolin (ANCEF) intermittent infusion 2 g in 100 mL dextrose PRE-MIX (0 g Intravenous Stopped 12/16/21 1458)         Disposition:  The patient was admitted to the hospital under the care of Dr. Hobson.     Impression & Plan     Medical Decision Making:  Carmen Perez is a 71 year old female who presents to the emergency department for evaluation of left hand pain and bruising and laceration after fall.  Patient states that last night she had a mechanical fall after her nasal cannula tubing was caught in a piece of furniture.  On arrival patient has low-grade regular tachycardia.  She has pursed lip breathing and tachypnea.  Breath sounds are distant.  She has severe COPD on chronic home O2.  SPO2 is in the mid 90s on 3-1/2 L per nasal cannula.  Initial blood gas reveals acute on chronic hypercarbic respiratory failure.  I suspect she has worsening of her underlying COPD.  She was given steroids and antibiotics and breathing treatment.  After 1 hour of BiPAP, PCO2 improved to 80s.  BiPAP was subsequently removed and on serial check less than 1 hour later,  PCO2 had climbed to 90.  She was subsequently placed back on BiPAP as she was feeling somewhat more short of breath.  With respect to fall, CT of the head was negative for acute intracranial process.  Chest x-ray was clear without evidence of rib fracture or pneumothorax or pneumonia.  No pulmonary edema to suggest heart failure as cause of presentation.  I considered PE, although based on exam, blood gas and history, COPD seemed a much more likely cause of her presentation.  The left hand does show proximal left ring finger phalanx displaced fracture.  I spoke with orthopedics and they recommended ulnar gutter splint with likely surgical intervention in the coming days if admitted here to the hospital.  They also recommended antibiotics due to open nature of this fracture.  Prior to splinting, laceration was repaired per procedure note above.  She does have contusions of her lower extremities but no deformities or significant pain to suggest occult fracture.  No signs of vertebral injury or intra-abdominal injury on exam.  At time to my colleague, patient was awaiting hospital bed.    Diagnosis:    ICD-10-CM    1. Fall, initial encounter  W19.XXXA    2. COPD exacerbation (H)  J44.1    3. Acute and chronic respiratory failure with hypercapnia (H)  J96.22    4. Open displaced fracture of proximal phalanx of left ring finger, initial encounter  S62.615B        Scribe Disclosure:  I, Chasity Anaya, am serving as a scribe at 9:31 AM on 12/16/2021 to document services personally performed by Lamont Suazo MD based on my observations and the provider's statements to me.        Lamont Suazo MD  12/16/21 3652

## 2021-12-16 NOTE — CONSULTS
[facility]    Orthopedic Consultation    Carmen Perez MRN# [id number]   Age: 71 year old YOB: 1950     Date of Admission: 2021    Reason for consult: Open left 4th phalanx fracture       Requesting physician: Call from ED        Level of consult: Consult, follow and place orders           Assessment and Plan:   Assessment:   Open left 4th phalanx fracture       Plan:   Patient's hand was splinted and closed in the ED  Keep splint applied and elevate when at rest  Oral Keflex tid x 10 days  Follow-up with Dr Ray in clinic next week for recheck of wound.   Care plan discussed with Dr Ray.             Chief Complaint:   Left hand pain          History of Present Illness:     Carmen Perez is an oxygen-dependent 71 year old female with history of severe COPD and asthma who presented to the ED after a fall which occurred last night.  She states that her nasal cannula tubing became caught on a piece of furniture as she was walking and she fell, striking the right side of her head on a dresser. No loss of consciousness occurred.  Carmen also complained of pain in her left hand. She has a laceration to her left 4th digit and reports that she was able to see bone inside the wound.   Carmen oxygen-dependent and is on 3.5 L via nasal cannula at baseline. Xrays revealed a fracture of the left 4th phalanx.  The finger was washed and splinted in the ED.               Past Medical History:     Past Medical History:   Diagnosis Date     Asthma     worse in the summer     COPD (chronic obstructive pulmonary disease) (H)     previous smoker     Cor pulmonale (H)      Hyperkalemia 2012     Mild major depression (H)      Myocardial infarction (H)     on furosemide             Past Surgical History:     Past Surgical History:   Procedure Laterality Date      SECTION       HERNIORRHAPHY INCISIONAL (LOCATION) N/A 2016    Procedure: HERNIORRHAPHY INCISIONAL (LOCATION);   Surgeon: Bronson Ornelas MD;  Location: [facility] OR     LAPAROSCOPIC ASSISTED COLECTOMY N/A 2016    Procedure: LAPAROSCOPIC ASSISTED COLECTOMY;  Surgeon: Bronson Ornelas MD;  Location: [facility] OR     LAPAROSCOPIC ASSISTED COLOSTOMY N/A 2016    Procedure: LAPAROSCOPIC ASSISTED COLOSTOMY;  Surgeon: Bronson Ornelas MD;  Location: [facility] OR     LAPAROSCOPIC TUBAL LIGATION               Social History:     Social History     Tobacco Use     Smoking status: Former Smoker     Packs/day: 0.25     Types: Cigarettes     Quit date: 2012     Years since quittin.5     Smokeless tobacco: Never Used   Substance Use Topics     Alcohol use: No     Alcohol/week: 0.0 standard drinks             Family History:     Family History   Problem Relation Age of Onset     C.A.D. Mother          in her 60's             Immunizations:     VACCINE/DOSE   Diptheria   DPT   DTAP   HBIG   Hepatitis A   Hepatitis B   HIB   Influenza   Measles   Meningococcal   MMR   Mumps   Pneumococcal   Polio   Rubella   Small Pox   TDAP   Varicella   Zoster             Allergies:   No Known Allergies          Medications:     Current Facility-Administered Medications   Medication     azithromycin 500 mg (ZITHROMAX) in 0.9% NaCl 250 mL intermittent infusion 500 mg     ceFAZolin (ANCEF) intermittent infusion 2 g in 100 mL dextrose PRE-MIX     lidocaine (PF) (XYLOCAINE) 1 % injection     Current Outpatient Medications   Medication Sig     acetaminophen (TYLENOL) 500 MG tablet Take 2 tablets (1,000 mg) by mouth 2 times daily     calcium carb-cholecalciferol 600-500 MG-UNIT CAPS Take 1 tablet by mouth daily     COMBIVENT RESPIMAT  MCG/ACT inhaler INHALE 1 PUFF INTO THE LUNGS FOUR TIMES DAILY AS NEEDED FOR SHORTNESS OF BREATH OR DIFFICULT BREATHING OR WHEEZING     fluticasone-salmeterol (ADVAIR DISKUS) 250-50 MCG/DOSE inhaler Inhale 1 puff into the lungs 2 times daily     INCRUSE ELLIPTA 62.5 MCG/INH Inhaler INHALE 1 PUFF INTO THE  LUNGS DAILY     venlafaxine (EFFEXOR-XR) 75 MG 24 hr capsule Take 1 capsule (75 mg) by mouth daily             Review of Systems:   ROS:  10 point ROS neg other than the symptoms noted above in the HPI.          Physical Exam:   All vitals have been reviewed  Patient Vitals for the past 24 hrs:   BP Temp Temp src Pulse Resp SpO2   12/16/21 1345 122/88 -- -- 111 -- 91 %   12/16/21 1330 -- -- -- 117 -- 91 %   12/16/21 1315 129/77 -- -- (!) 124 -- 92 %   12/16/21 1300 134/89 -- -- 120 -- 92 %   12/16/21 1245 119/75 -- -- 106 -- 94 %   12/16/21 1230 -- -- -- 84 -- 93 %   12/16/21 1220 112/74 -- -- 111 -- 93 %   12/16/21 1130 -- -- -- 110 -- 93 %   12/16/21 1115 121/68 -- -- 110 -- 92 %   12/16/21 1100 126/74 -- -- 109 -- 92 %   12/16/21 1045 (!) 143/73 -- -- 113 -- 94 %   12/16/21 1030 -- -- -- 107 -- 93 %   12/16/21 1015 (!) 148/80 -- -- 106 -- 100 %   12/16/21 1008 -- -- -- (!) 125 23 99 %   12/16/21 0915 -- -- -- -- 30 --   12/16/21 0910 (!) 165/101 97.8  F (36.6  C) Oral 120 -- 94 %     No intake or output data in the 24 hours ending 12/16/21 1444      Physical Exam   Temp: 97.8  F (36.6  C) Temp src: Oral BP: 122/88 Pulse: 111   Resp: 23 SpO2: 91 % O2 Device: Oxymask Oxygen Delivery: 7 LPM  Vital Signs with Ranges  Temp:  [97.8  F (36.6  C)] 97.8  F (36.6  C)  Pulse:  [] 111  Resp:  [23-30] 23  BP: (112-165)/() 122/88  FiO2 (%):  [40 %] 40 %  SpO2:  [91 %-100 %] 91 %  0 lbs 0 oz    Constitutional: Pleasant, alert, appropriate, following commands.  HEENT: Head atraumatic normocephalic. Pupils equal round and reactive to light.  Respiratory: On Bipap in the ED  Cardiovascular: Regular rate and rhythm per pulses   GI: Abdomen nondistended.  Lymph/Hematologic: No lymphadenopathy in areas examined  Genitourinary:  No pierre  Skin: No rashes, no cyanosis, no edema.  Musculoskeletal: On exam of the left 4th finger, the hand is in an ulnar gutter splint.  She reports sensation at the fingertip and is able to  flex/extend at the DIP joint.  Brisk cap refill.  Denies pain with elbow ROM.    Neurologic: normal without focal findings, mental status, speech normal, alert and oriented x iii  Neuropsychiatric: stable           Data:   All laboratory data reviewed  Results for orders placed or performed during the hospital encounter of 12/16/21   Head CT w/o contrast     Status: None    Narrative    CT SCAN OF THE HEAD WITHOUT CONTRAST   12/16/2021 2:04 PM     HISTORY: Fall, head injury.    TECHNIQUE:  Axial images of the head and coronal reformations without  IV contrast material. Radiation dose for this scan was reduced using  automated exposure control, adjustment of the mA and/or kV according  to patient size, or iterative reconstruction technique.    COMPARISON: Head CT 4/21/2019.    FINDINGS:  Mild volume loss is present. White matter hypoattenuation likely  represents mild chronic small vessel ischemic change. The cerebral  hemispheres, brainstem, and cerebellum otherwise demonstrate normal  morphology and attenuation. No evidence of acute ischemia, hemorrhage,  mass, mass effect or hydrocephalus. The visualized calvarium, tympanic  cavities, mastoid cavities, and paranasal sinuses are unremarkable.  Bilateral lens replacements.      Impression    IMPRESSION:   No acute intracranial abnormality.    CARMELINA CHAVEZ MD         SYSTEM ID:  [id number]   XR Chest Port 1 View     Status: None    Narrative    CHEST ONE VIEW PORTABLE   12/16/2021 1:13 PM     HISTORY: Shortness of breath, history of COPD.    COMPARISON: 5/28/2016      Impression    IMPRESSION: Hyperexpanded lungs with coarsened interstitial markings  compatible with COPD. No lobar consolidation, pneumothorax, or pleural  effusion.    NICKY BRODERICK MD         SYSTEM ID:  [id number]   XR Hand Port Left G/E 3 Views     Status: None (Preliminary result)    Narrative    HAND PORTABLE LEFT THREE OR MORE VIEWS    12/16/2021 1:13 PM     HISTORY:  Bruising pain after  fall.    FINDINGS: Osteopenia. Scattered areas of degenerative arthrosis. Old  ununited ulnar styloid fracture.      Impression    IMPRESSION: Mildly comminuted fracture of the fourth proximal phalanx,  including 4.5 mm of dorsal displacement. There is apex volar  angulation, and mild impaction as well.   Basic metabolic panel     Status: Abnormal   Result Value Ref Range    Sodium 144 133 - 144 mmol/L    Potassium 4.4 3.4 - 5.3 mmol/L    Chloride 99 94 - 109 mmol/L    Carbon Dioxide (CO2) 43 (H) 20 - 32 mmol/L    Anion Gap 2 (L) 3 - 14 mmol/L    Urea Nitrogen 13 7 - 30 mg/dL    Creatinine 0.52 0.52 - 1.04 mg/dL    Calcium 9.3 8.5 - 10.1 mg/dL    Glucose 111 (H) 70 - 99 mg/dL    GFR Estimate >90 >60 mL/min/1.73m2   Troponin I     Status: Normal   Result Value Ref Range    Troponin I High Sensitivity 12 <54 ng/L   Blood gas venous and oxyhgb     Status: Abnormal   Result Value Ref Range    pH Venous 7.24 (L) 7.32 - 7.43    pCO2 Venous 114 (HH) 40 - 50 mm Hg    pO2 Venous 36 25 - 47 mm Hg    Bicarbonate Venous 48 (HH) 21 - 28 mmol/L    FIO2 4     Oxyhemoglobin Venous 59 (L) 70 - 75 %    Base Excess/Deficit (+/-) 16.7 (H) -7.7 - 1.9 mmol/L   UA with Microscopic reflex to Culture     Status: Abnormal    Specimen: Urine, Catheter   Result Value Ref Range    Color Urine Yellow Colorless, Straw, Light Yellow, Yellow    Appearance Urine Clear Clear    Glucose Urine Negative Negative mg/dL    Bilirubin Urine Negative Negative    Ketones Urine Trace (A) Negative mg/dL    Specific Gravity Urine 1.021 1.003 - 1.035    Blood Urine Negative Negative    pH Urine 6.0 5.0 - 7.0    Protein Albumin Urine 10  (A) Negative mg/dL    Urobilinogen Urine Normal Normal, 2.0 mg/dL    Nitrite Urine Negative Negative    Leukocyte Esterase Urine Negative Negative    Mucus Urine Present (A) None Seen /LPF    RBC Urine 1 <=2 /HPF    WBC Urine <1 <=5 /HPF    Squamous Epithelials Urine <1 <=1 /HPF    Narrative    Urine Culture not indicated   CBC  with platelets and differential     Status: Abnormal   Result Value Ref Range    WBC Count 10.9 4.0 - 11.0 10e3/uL    RBC Count 3.89 3.80 - 5.20 10e6/uL    Hemoglobin 10.8 (L) 11.7 - 15.7 g/dL    Hematocrit 39.3 35.0 - 47.0 %     (H) 78 - 100 fL    MCH 27.8 26.5 - 33.0 pg    MCHC 27.5 (L) 31.5 - 36.5 g/dL    RDW 12.4 10.0 - 15.0 %    Platelet Count 371 150 - 450 10e3/uL    % Neutrophils 80 %    % Lymphocytes 10 %    % Monocytes 7 %    % Eosinophils 3 %    % Basophils 0 %    % Immature Granulocytes 0 %    NRBCs per 100 WBC 0 <1 /100    Absolute Neutrophils 8.8 (H) 1.6 - 8.3 10e3/uL    Absolute Lymphocytes 1.0 0.8 - 5.3 10e3/uL    Absolute Monocytes 0.7 0.0 - 1.3 10e3/uL    Absolute Eosinophils 0.3 0.0 - 0.7 10e3/uL    Absolute Basophils 0.0 0.0 - 0.2 10e3/uL    Absolute Immature Granulocytes 0.0 <=0.4 10e3/uL    Absolute NRBCs 0.0 10e3/uL   Extra Blue Top Tube     Status: None   Result Value Ref Range    Hold Specimen JI    Asymptomatic COVID-19 Virus (Coronavirus) by PCR Nasopharyngeal     Status: Normal    Specimen: Nasopharyngeal; Swab   Result Value Ref Range    SARS CoV2 PCR Negative Negative    Narrative    Testing was performed using the karen  SARS-CoV-2 & Influenza A/B Assay on the karen  [employer]  System.  This test should be ordered for the detection of SARS-COV-2 in individuals who meet SARS-CoV-2 clinical and/or epidemiological criteria. Test performance is unknown in asymptomatic patients.  This test is for in vitro diagnostic use under the FDA EUA for laboratories certified under CLIA to perform moderate and/or high complexity testing. This test has not been FDA cleared or approved.  A negative test does not rule out the presence of PCR inhibitors in the specimen or target RNA in concentration below the limit of detection for the assay. The possibility of a false negative should be considered if the patient's recent exposure or clinical presentation suggests COVID-19.  [facility]  Laboratories are certified under the Clinical Laboratory Improvement Amendments of 1988 (CLIA-88) as qualified to perform moderate and/or high complexity laboratory testing.   Blood gas venous and oxyhgb     Status: Abnormal   Result Value Ref Range    pH Venous 7.34 7.32 - 7.43    pCO2 Venous 83 (HH) 40 - 50 mm Hg    pO2 Venous 32 25 - 47 mm Hg    Bicarbonate Venous 44 (H) 21 - 28 mmol/L    FIO2 40     Oxyhemoglobin Venous 57 (L) 70 - 75 %    Base Excess/Deficit (+/-) 15.4 (H) -7.7 - 1.9 mmol/L   EKG 12-lead, tracing only     Status: None   Result Value Ref Range    Systolic Blood Pressure  mmHg    Diastolic Blood Pressure  mmHg    Ventricular Rate 113 BPM    Atrial Rate 113 BPM    DC Interval 176 ms    QRS Duration 110 ms     ms    QTc 449 ms    P Axis 87 degrees    R AXIS -41 degrees    T Axis 61 degrees    Interpretation ECG       Sinus tachycardia  Right atrial enlargement  Left axis deviation  Pulmonary disease pattern  Right bundle branch block  Possible Inferior infarct , age undetermined  Abnormal ECG  When compared with ECG of 18-MAY-2016 13:54,  QRS axis Shifted left  Borderline criteria for Inferior infarct are now Present     CBC with platelets differential     Status: Abnormal    Narrative    The following orders were created for panel order CBC with platelets differential.  Procedure                               Abnormality         Status                     ---------                               -----------         ------                     CBC with platelets and d...[[id number]]  Abnormal            Final result                 Please view results for these tests on the individual orders.   [place] Draw     Status: None    Narrative    The following orders were created for panel order [place] Draw.  Procedure                               Abnormality         Status                     ---------                               -----------         ------                     Extra Blue Top  Tube[[id number]]                              Final result                 Please view results for these tests on the individual orders.          Attestation:  I have reviewed today's vital signs, notes, medications, labs and imaging with Dr. Ray.  Amount of time performed on this consult: 20 minutes.    Ailin Rojas PA-C

## 2021-12-16 NOTE — H&P
[facility]  Hospitalist Admission Note  Name: Carmen Perez    MRN: [id number]  YOB: 1950    Age: 71 year old  Date of admission: 12/16/2021  Primary care provider: Vicky Ye    Chief Complaint:  Copd exacerbation, fall    Carmen Perez is a 71 year old female with PMH including COPD, chronic hypoxemic respiratory failure, cor pulmonale who is not been vaccinated against COVID-19 and has essentially been homebound for isolation purposes for the past 2 years who presents with a fall last night after her nasal cannula tubing became caught on some furniture when she was walking.  She tripped on this and fell, hitting the right side of her head on a dresser and injuring her left hand.  She was eventually able to get up on her own and eventually called paramedics.  When she injured her left hand she noticed a deformity and had a laceration there where she thinks she was actually able to see bone.  She does note she has been more short of breath and generally weak lately.    Here in the emergency room she was tachycardic to 120, hypertensive to 165/101 and afebrile.  She was noted to have significantly increased work of breathing and was placed on BiPAP.  She was felt to have expiratory wheezing.  Work-up here was notable for BMP showing carbon dioxide level of 43, otherwise grossly normal.  Negative troponin, glucose of 111, VBG showing a pH of 7.24 with PCO2 of 114.  CBC was grossly normal aside from hemoglobin of 10.8.  Urine analysis was negative for infection.  EKG showed sinus tachycardia.  Chest x-ray showed hyperinflated lungs without focal consolidation.  X-ray of her hand showed a right fourth proximal phalanx fracture with comminution and displacement.    She was given IV Solu-Medrol, ceftriaxone, azithromycin and orthopedic surgery was contacted regarding her fall and left fourth digit fracture.  Repeat VBG showed improvement in hypercapnia with pH up to 7.34 and  PCO2 down to 83.  She is being admitted for treatment of a copd exacerbation, generalized weakness and finger fracture.    Assessment and Plan:   1. Acute on chronic hypoxemic and hypercapnic respiratory failure due to COPD exacerbation: Trigger unclear.  No pneumonia on chest x-ray.  --Admit under inpatient status  --IV Solu-Medrol 40 mg twice daily  --Continue azithromycin as started in the ER.  We will also be on Ancef for her finger fracture.  --Repeat VBG in the morning  --Goal SPO2 of 90%, do not need to push higher for risk of worsening hypercapnia  --Currently off BiPAP, likely will need this periodically.    2.   Fall with mildly comminuted fracture over the fourth proximal phalanx with 4.5 mm of dorsal displacement: Orthopedics contacted from the ER for definitive management.  --Treat pain with Tylenol, ice.  --In the setting of an open fracture she was started on Ancef in the ER.  We will continue that for now.  10 days total were ordered.    3.   COPD: With acute exacerbation as above.  Chronically on 3.5 L/min supplemental oxygen.  Will resume his home inhalers once verified.    4.   History of right-sided heart failure, pulmonary hypertension: Resume home Lasix.    5.   Generalized weakness: PT and OT consults.    6.  Nonvaccinated status: She is open to vaccination.  She is open to vaccination prior to discharge.  It seems the reason she has not been vaccinated that she has been completely isolated at home due to risks of matthew Covid.    7.  Ostomy status: Due to perforated diverticulitis in 2016.  Denies issue with this.  --[facility] consult    DVT Prophylaxis: Enoxaparin (Lovenox) SQ  Code Status: Full Code  Discharge Dispo: adimt to inpatient status      History of Present Illness:  Carmen Perez is a 71 year old female with PMH including COPD, chronic hypoxemic respiratory failure, cor pulmonale who presents with a fall last night after her nasal cannula tubing became caught on some furniture when  she was walking.  She tripped on this and fell, hitting the right side of her head on a dresser and injuring her left hand.  She was eventually able to get up on her own and eventually called paramedics.  When she injured her left hand she noticed a deformity and had a laceration there where she thinks she was actually able to see bone.  She does note she has been more short of breath and generally weak lately.    She reports she has essentially not left her house in about 2 years due to the pandemic and wanting to self isolate.  She is not vaccinated against COVID-19 but is open to it.  She has not been vaccinated mostly because she has not wanted to leave the house.  She is open to vaccination prior to discharge.    She quit smoking in .  She does have an ostomy and denies recent issues with this.  She reports she has the ostomy due to perforated diverticulitis in      Past Medical History:  Past Medical History:   Diagnosis Date     Asthma     worse in the summer     COPD (chronic obstructive pulmonary disease) (H)     previous smoker     Cor pulmonale (H)      Hyperkalemia 2012     Mild major depression (H)      Myocardial infarction (H)     on furosemide     Past Surgical History:  Past Surgical History:   Procedure Laterality Date      SECTION       HERNIORRHAPHY INCISIONAL (LOCATION) N/A 2016    Procedure: HERNIORRHAPHY INCISIONAL (LOCATION);  Surgeon: Bronson Ornelas MD;  Location:  OR     LAPAROSCOPIC ASSISTED COLECTOMY N/A 2016    Procedure: LAPAROSCOPIC ASSISTED COLECTOMY;  Surgeon: Bronson Ornelas MD;  Location:  OR     LAPAROSCOPIC ASSISTED COLOSTOMY N/A 2016    Procedure: LAPAROSCOPIC ASSISTED COLOSTOMY;  Surgeon: Bronson Ornelas MD;  Location:  OR     LAPAROSCOPIC TUBAL LIGATION       Social History:  Social History     Tobacco Use     Smoking status: Former Smoker     Packs/day: 0.25     Types: Cigarettes     Quit date: 2012      Years since quittin.5     Smokeless tobacco: Never Used   Substance Use Topics     Alcohol use: No     Alcohol/week: 0.0 standard drinks     Social History     Social History Narrative     Not on file     Family History:  Family History   Problem Relation Age of Onset     C.A.D. Mother          in her 60's     Allergies:  No Known Allergies  Medications:  No current facility-administered medications on file prior to encounter.  acetaminophen (TYLENOL) 500 MG tablet, Take 2 tablets (1,000 mg) by mouth 2 times daily  calcium carb-cholecalciferol 600-500 MG-UNIT CAPS, Take 1 tablet by mouth daily  COMBIVENT RESPIMAT  MCG/ACT inhaler, INHALE 1 PUFF INTO THE LUNGS FOUR TIMES DAILY AS NEEDED FOR SHORTNESS OF BREATH OR DIFFICULT BREATHING OR WHEEZING  fluticasone-salmeterol (ADVAIR DISKUS) 250-50 MCG/DOSE inhaler, Inhale 1 puff into the lungs 2 times daily  INCRUSE ELLIPTA 62.5 MCG/INH Inhaler, INHALE 1 PUFF INTO THE LUNGS DAILY  venlafaxine (EFFEXOR-XR) 75 MG 24 hr capsule, Take 1 capsule (75 mg) by mouth daily        Review of Systems:  A Comprehensive greater than 10 system review of systems was carried out.  Pertinent positives and negatives are noted above.  Otherwise negative for contributory information.     Physical Exam:  Blood pressure 122/88, pulse 111, temperature 97.8  F (36.6  C), temperature source Oral, resp. rate 23, SpO2 91 %, not currently breastfeeding.  Wt Readings from Last 1 Encounters:   20 74.4 kg (164 lb)     Exam:  General: Alert, awake, no acute distress.  HEENT: NC/AT, eyes anicteric, external occular movements intact, face symmetric. Temporal wasting.    Cardiac: RRR, S1, S2.    Pulmonary: Normal chest rise, bipap in place.  Exp wheezing.  Abdomen: soft, non-tender, non-distended.  Bowel Sounds Present.    Extremities: no deformities.    Skin: no rashes or lesions noted.  Warm and Dry.  Neuro: No focal deficits noted.  Speech clear.  Coordination and strength grossly  normal.  Psych: Appropriate affect.    Data:  EKG:  Sinus tach  Imaging:  Results for orders placed or performed during the hospital encounter of 12/16/21   Head CT w/o contrast    Narrative    CT SCAN OF THE HEAD WITHOUT CONTRAST   12/16/2021 2:04 PM     HISTORY: Fall, head injury.    TECHNIQUE:  Axial images of the head and coronal reformations without  IV contrast material. Radiation dose for this scan was reduced using  automated exposure control, adjustment of the mA and/or kV according  to patient size, or iterative reconstruction technique.    COMPARISON: Head CT 4/21/2019.    FINDINGS:  Mild volume loss is present. White matter hypoattenuation likely  represents mild chronic small vessel ischemic change. The cerebral  hemispheres, brainstem, and cerebellum otherwise demonstrate normal  morphology and attenuation. No evidence of acute ischemia, hemorrhage,  mass, mass effect or hydrocephalus. The visualized calvarium, tympanic  cavities, mastoid cavities, and paranasal sinuses are unremarkable.  Bilateral lens replacements.      Impression    IMPRESSION:   No acute intracranial abnormality.    CARMELINA CHAVEZ MD         SYSTEM ID:  [id number]   XR Chest Port 1 View    Narrative    CHEST ONE VIEW PORTABLE   12/16/2021 1:13 PM     HISTORY: Shortness of breath, history of COPD.    COMPARISON: 5/28/2016      Impression    IMPRESSION: Hyperexpanded lungs with coarsened interstitial markings  compatible with COPD. No lobar consolidation, pneumothorax, or pleural  effusion.    NICKY BRODERICK MD         SYSTEM ID:  [id number]   XR Hand Port Left G/E 3 Views    Narrative    HAND PORTABLE LEFT THREE OR MORE VIEWS    12/16/2021 1:13 PM     HISTORY:  Bruising pain after fall.    FINDINGS: Osteopenia. Scattered areas of degenerative arthrosis. Old  ununited ulnar styloid fracture.      Impression    IMPRESSION: Mildly comminuted fracture of the fourth proximal phalanx,  including 4.5 mm of dorsal displacement. There is  apex volar  angulation, and mild impaction as well.     Labs:  Recent Labs   Lab 12/16/21  1125 12/16/21  0937   PHV 7.34 7.24*   PO2V 32 36   PCO2V 83* 114*   HCO3V 44* 48*     Recent Labs   Lab 12/16/21  0937   WBC 10.9   HGB 10.8*   HCT 39.3   *             Lab Results   Component Value Date     12/16/2021     01/11/2020     04/21/2019     02/04/2019    Lab Results   Component Value Date    CHLORIDE 99 12/16/2021    CHLORIDE 102 01/11/2020    CHLORIDE 106 04/21/2019    CHLORIDE 101 02/04/2019    Lab Results   Component Value Date    BUN 13 12/16/2021    BUN 15 01/11/2020    BUN 16 04/21/2019    BUN 11 02/04/2019      Lab Results   Component Value Date    POTASSIUM 4.4 12/16/2021    POTASSIUM 4.1 01/11/2020    POTASSIUM 4.4 04/21/2019    POTASSIUM 4.5 02/04/2019    Lab Results   Component Value Date    CO2 43 12/16/2021    CO2 34 01/11/2020    CO2 32 04/21/2019    CO2 31 02/04/2019    Lab Results   Component Value Date    CR 0.52 12/16/2021    CR 0.60 01/11/2020    CR 0.69 04/21/2019    CR 0.74 02/04/2019            Chapito Hobson MD  Hospitalist  [facility]

## 2021-12-16 NOTE — PROGRESS NOTES
A BiPAP of  12/6 @ 40% was applied to the pt via the mask for an increase in WOB and/or SOB.  The bridge of the nose looks good at this time with gel pad in place. Pt is tolerating it well. Duoneb given once in line. BS diminished, SpO2 high 90's. Will continue to monitor and assess the pt's current respiratory status and needs.      Patito Esparza, RT, RT  12/16/2021 10:22 AM

## 2021-12-16 NOTE — ED TRIAGE NOTES
Pt arrived via EMS. Yesterday at home fell when trying to move home oxygen. On 3.5 liters NC baseline for COPD. Increased swelling/pain in left hand related to fall     Duoneb given by EMS in route. Pt states that her respiratory status is currently at baseline.

## 2021-12-16 NOTE — ED NOTES
RN took pt off bipap for transfer to CT. Will tril pt off bipap per MD. Will reassess blood gas after 1 hour on oxymask if patient can tolerate no bipap.

## 2021-12-17 NOTE — ED NOTES
DATE:  12/17/2021   TIME OF RECEIPT FROM LAB:  0651  LAB TEST:  VBG  LAB VALUE:  PCO2 77  RESULTS GIVEN WITH READ-BACK TO (PROVIDER):  Hospitalist paged  TIME LAB VALUE REPORTED TO PROVIDER:   0652

## 2021-12-17 NOTE — UTILIZATION REVIEW
"  Admission Status; Secondary Review Determination         Under the authority of the Utilization Management Committee, the utilization review process indicated a secondary review on the above patient.  The review outcome is based on review of the medical records, discussions with staff, and applying clinical experience noted on the date of the review.        (xxx)      Inpatient Status Appropriate - This patient's medical care is consistent with medical management for inpatient care and reasonable inpatient medical practice.      () Observation Status Appropriate - This patient does not meet hospital inpatient criteria and is placed in observation status. If this patient's primary payer is Medicare and was admitted as an inpatient, Condition Code 44 should be used and patient status changed to \"observation\".   () Admission Status NOT Appropriate - This patient's medical care is not consistent with medical management for Inpatient or Observation Status.          RATIONALE FOR DETERMINATION     Carmen Perez is a 71 year old female with oxygen dependent COPD, chronic hypoxemic respiratory failure, cor pulmonale, not vaccinated against COVID-19 who presented after a mechanical fall.  Her nasal cannula tubing became caught on some furniture when she was walking.  She tripped on this and fell, hitting the right side of her head on a dresser and injuring her left hand.  She has been more short of breath and generally weak lately.     In the emergency room she was tachycardic to 120, hypertensive to 165/101 and afebrile.  She was noted to have significantly increased work of breathing and was placed on BiPAP.  Negative troponin, glucose of 111, VBG showing a pH of 7.24 with PCO2 of 114.  CBC was grossly normal aside from hemoglobin of 10.8.  Urine analysis was negative for infection.  EKG showed sinus tachycardia.  Chest x-ray showed hyperinflated lungs without focal consolidation.  X-ray of her hand showed a left fourth " proximal phalanx fracture with comminution and displacement.  This is an open fracture.  She is requiring IV medications, respiratory support requiring BiPap, close clinical monitoring and orthopedic consultation.  IP status appropriate.    The severity of illness, intensity of service provided, expected LOS and risk for adverse outcome make the care complex, high risk and appropriate for hospital admission.        The information on this document is developed by the utilization review team in order for the business office to ensure compliance.  This only denotes the appropriateness of proper admission status and does not reflect the quality of care rendered.         The definitions of Inpatient Status and Observation Status used in making the determination above are those provided in the CMS Coverage Manual, Chapter 1 and Chapter 6, section 70.4.      Sincerely,     Keri Leyva MD  Physician Advisor   Utilization Review/ Case Management  [facility].

## 2021-12-17 NOTE — DISCHARGE INSTRUCTIONS
[facility]  WOC Nurse Discharge Instructions for Established Colostomy     Follow up     1. WOC Nurses (ostomy nurses)        When you get home make an outpatient appointment with the WOCN nurses as needed to address pouching issues or for yearly assessment    [facility] [phone]  [facility] appointments clinic is located in the [facility] building just east of the [facility] in [address].

## 2021-12-17 NOTE — ED NOTES
[facility]  ED Nurse Handoff Report    Carmen Perez is a 71 year old female   ED Chief complaint: Fall and Hand Pain  . ED Diagnosis:   Final diagnoses:   Fall, initial encounter   COPD exacerbation (H)   Acute and chronic respiratory failure with hypercapnia (H)   Open displaced fracture of proximal phalanx of left ring finger, initial encounter     Allergies: No Known Allergies    Code Status: Full Code  Activity level - Baseline/Home:  Independent. Activity Level - Current:   Stand by Assist. Lift room needed: No. Bariatric: No   Needed: No   Isolation: No. Infection: Not Applicable.     Vital Signs:   Vitals:    12/16/21 2049 12/16/21 2100 12/16/21 2200 12/16/21 2216   BP:  119/73 105/74    Pulse: 109 108 105 109   Resp:       Temp:       TempSrc:       SpO2: 95% 96% 97% 98%       Cardiac Rhythm:  ,   Cardiac  Cardiac Rhythm: Sinus tachycardia (rate 112)  Pain level:    Patient confused: No. Patient Falls Risk: Yes.   Elimination Status: Has voided   Patient Report - Initial Complaint: PMH including COPD, chronic hypoxemic respiratory failure, cor pulmonale who is not been vaccinated against COVID-19 and has essentially been homebound for isolation purposes for the past 2 years who presents with a fall last night after her nasal cannula tubing became caught on some furniture when she was walking.  She tripped on this and fell, hitting the right side of her head on a dresser and injuring her left hand.  She was eventually able to get up on her own and eventually called paramedics.  When she injured her left hand she noticed a deformity and had a laceration there where she thinks she was actually able to see bone.  She does note she has been more short of breath and generally weak lately.. Focused Assessment: BIPAP 40% 12/6. GEE. Afebrile. Splint/cast to left hand/arm. Scattered bruising throughout. Denies pain   Tests Performed:   Labs Ordered and Resulted from Time of ED Arrival to Time  of ED Departure   BASIC METABOLIC PANEL - Abnormal       Result Value    Sodium 144      Potassium 4.4      Chloride 99      Carbon Dioxide (CO2) 43 (*)     Anion Gap 2 (*)     Urea Nitrogen 13      Creatinine 0.52      Calcium 9.3      Glucose 111 (*)     GFR Estimate >90     BLOOD GAS VENOUS WITH OXYHEMOGLOBIN - Abnormal    pH Venous 7.24 (*)     pCO2 Venous 114 (*)     pO2 Venous 36      Bicarbonate Venous 48 (*)     FIO2 4      Oxyhemoglobin Venous 59 (*)     Base Excess/Deficit (+/-) 16.7 (*)    ROUTINE UA WITH MICROSCOPIC REFLEX TO CULTURE - Abnormal    Color Urine Yellow      Appearance Urine Clear      Glucose Urine Negative      Bilirubin Urine Negative      Ketones Urine Trace (*)     Specific Gravity Urine 1.021      Blood Urine Negative      pH Urine 6.0      Protein Albumin Urine 10  (*)     Urobilinogen Urine Normal      Nitrite Urine Negative      Leukocyte Esterase Urine Negative      Mucus Urine Present (*)     RBC Urine 1      WBC Urine <1      Squamous Epithelials Urine <1     CBC WITH PLATELETS AND DIFFERENTIAL - Abnormal    WBC Count 10.9      RBC Count 3.89      Hemoglobin 10.8 (*)     Hematocrit 39.3       (*)     MCH 27.8      MCHC 27.5 (*)     RDW 12.4      Platelet Count 371      % Neutrophils 80      % Lymphocytes 10      % Monocytes 7      % Eosinophils 3      % Basophils 0      % Immature Granulocytes 0      NRBCs per 100 WBC 0      Absolute Neutrophils 8.8 (*)     Absolute Lymphocytes 1.0      Absolute Monocytes 0.7      Absolute Eosinophils 0.3      Absolute Basophils 0.0      Absolute Immature Granulocytes 0.0      Absolute NRBCs 0.0     BLOOD GAS VENOUS WITH OXYHEMOGLOBIN - Abnormal    pH Venous 7.34      pCO2 Venous 83 (*)     pO2 Venous 32      Bicarbonate Venous 44 (*)     FIO2 40      Oxyhemoglobin Venous 57 (*)     Base Excess/Deficit (+/-) 15.4 (*)    ISTAT GASES LACTATE VENOUS POCT - Abnormal    Lactic Acid POCT 0.6      Bicarbonate Venous POCT 47 (*)     O2 Sat, Venous  POCT 87 (*)     pCO2V Venous POCT 90 (*)     pH Venous POCT 7.32      pO2 Venous POCT 61 (*)    BLOOD GAS VENOUS WITH OXYHEMOGLOBIN - Abnormal    pH Venous 7.39      pCO2 Venous 71 (*)     pO2 Venous 57 (*)     Bicarbonate Venous 43 (*)     FIO2 40      Oxyhemoglobin Venous 88 (*)     Base Excess/Deficit (+/-) 15.0 (*)    TROPONIN I - Normal    Troponin I High Sensitivity 12     COVID-19 VIRUS (CORONAVIRUS) BY PCR - Normal    SARS CoV2 PCR Negative       . Abnormal Results:   Head CT w/o contrast   Final Result   IMPRESSION:    No acute intracranial abnormality.      CARMELINA CHAVEZ MD            SYSTEM ID:  [id number]      XR Hand Port Left G/E 3 Views   Final Result   IMPRESSION: Mildly comminuted fracture of the fourth proximal phalanx,   including 4.5 mm of dorsal displacement. There is apex volar   angulation, and mild impaction as well.      WILLY MONTEIRO MD            SYSTEM ID:  [id number]      XR Chest Port 1 View   Final Result   IMPRESSION: Hyperexpanded lungs with coarsened interstitial markings   compatible with COPD. No lobar consolidation, pneumothorax, or pleural   effusion.      NICKY BRODERICK MD            SYSTEM ID:  [id number]        .   Treatments provided: See MAR and flowsheets  Family Comments: n/a  OBS brochure/video discussed/provided to patient:  Yes  ED Medications:   Medications   lidocaine 1 % 0.1-1 mL (has no administration in time range)   lidocaine (LMX4) cream (has no administration in time range)   sodium chloride (PF) 0.9% PF flush 3 mL (3 mLs Intracatheter Given 12/16/21 1517)   sodium chloride (PF) 0.9% PF flush 3 mL (has no administration in time range)   melatonin tablet 1 mg (has no administration in time range)   enoxaparin ANTICOAGULANT (LOVENOX) injection 40 mg (40 mg Subcutaneous Given 12/16/21 1556)   acetaminophen (TYLENOL) tablet 975 mg (975 mg Oral Not Given 12/16/21 1506)   ondansetron (ZOFRAN-ODT) ODT tab 4 mg (has no administration in time range)     Or   ondansetron  (ZOFRAN) injection 4 mg (has no administration in time range)   senna-docusate (SENOKOT-S/PERICOLACE) 8.6-50 MG per tablet 1 tablet (has no administration in time range)     Or   senna-docusate (SENOKOT-S/PERICOLACE) 8.6-50 MG per tablet 2 tablet (has no administration in time range)   azithromycin (ZITHROMAX) tablet 250 mg (has no administration in time range)   methylPREDNISolone sodium succinate (solu-MEDROL) injection 40 mg (has no administration in time range)   cephALEXin (KEFLEX) suspension 500 mg (500 mg Oral Given 12/16/21 2130)   ipratropium - albuterol 0.5 mg/2.5 mg/3 mL (DUONEB) neb solution 3 mL (3 mLs Nebulization Given 12/16/21 2007)   ipratropium-albuterol (COMBIVENT RESPIMAT) inhaler 1 puff (has no administration in time range)   fluticasone-salmeterol (ADVAIR) 250-50 MCG/DOSE diskus inhaler 1 puff (has no administration in time range)   venlafaxine (EFFEXOR-XR) 24 hr capsule 75 mg (has no administration in time range)   No lozenges or gum should be given while patient on BIPAP/AVAPS/AVAPS AE (has no administration in time range)   artificial tears (GENTEAL) 0.1-0.2-0.3 % ophthalmic solution 1 drop (has no administration in time range)   Patient may continue current oral medications (has no administration in time range)   methylPREDNISolone sodium succinate (solu-MEDROL) injection 125 mg (125 mg Intravenous Given 12/16/21 1004)   ipratropium - albuterol 0.5 mg/2.5 mg/3 mL (DUONEB) neb solution 3 mL (3 mLs Nebulization Given 12/16/21 1008)   LORazepam (ATIVAN) 2 MG/ML injection (0.5 mg  Given 12/16/21 1040)   ceFAZolin (ANCEF) intermittent infusion 2 g in 100 mL dextrose PRE-MIX (0 g Intravenous Stopped 12/16/21 1458)   azithromycin 500 mg (ZITHROMAX) in 0.9% NaCl 250 mL intermittent infusion 500 mg (0 mg Intravenous Stopped 12/16/21 1630)     Drips infusing:  No  For the majority of the shift, the patient's behavior Green. Interventions performed were n/a.    Sepsis treatment initiated: No      Patient tested for COVID 19 prior to admission: YES - negative    ED Nurse Name/Phone Number: Gladis Parisi RN,   11:09 PM    RECEIVING UNIT ED HANDOFF REVIEW    Above ED Nurse Handoff Report was reviewed: Yes  Reviewed by: Ginny Peters RN on December 17, 2021 at 1:59 PM

## 2021-12-17 NOTE — PROGRESS NOTES
Pt remained on BiPAP of 12/6 @35% all night.    RT will assess and weaned as tolerated.    Lou Contreras, RT

## 2021-12-17 NOTE — CONSULTS
See ED note for SW assessment   Taryn Ellison MSW, [facility]  ED    [phone]

## 2021-12-17 NOTE — ED NOTES
Care Management Initial Consult    General Information  Assessment completed with: Patient,    Type of CM/SW Visit: Initial Assessment    Primary Care Provider verified and updated as needed: Yes   Readmission within the last 30 days: no previous admission in last 30 days   Return Category: New Diagnosis  Reason for Consult: discharge planning  Advance Care Planning: Advance Care Planning Reviewed: verified with patient          Communication Assessment  Patient's communication style: spoken language (English or Bilingual)        Cognitive  Cognitive/Neuro/Behavioral: orientation        Orientation: oriented x 4             Living Environment:   People in home: alone     Current living Arrangements: apartment      Able to return to prior arrangements: yes       Family/Social Support:  Care provided by: child(angi),self  Provides care for: no one, unable/limited ability to care for self  Marital Status: Single  Children          Description of Support System: Supportive,Involved    Support Assessment: Adequate family and caregiver support,Adequate social supports    Current Resources:   Patient receiving home care services: No     Community Resources: [facility] Worker ([facility] Cassandra [phone]). PT reports she called the [facility] the [facility] to establish herself with a  after a past hospital stay.     Equipment currently used at home: cane, straight,walker, standard,wheelchair, manual  Supplies currently used at home: Oxygen Tubing/Supplies. Pt thinks she gets her oxygen through [facility]. Baseline patient is on 3.5L    Employment/Financial:  Employment Status: retired        Financial Concerns: No concerns identified   Referral to Financial Counselor: No       Lifestyle & Psychosocial Needs:  Social Determinants of Health     Tobacco Use: Medium Risk     Smoking Tobacco Use: Former Smoker     Smokeless Tobacco Use: Never Used   Alcohol Use: Not on file   Financial Resource  Strain: Not on file   Food Insecurity: Not on file   Transportation Needs: Not on file   Physical Activity: Not on file   Stress: Not on file   Social Connections: Not on file   Intimate Partner Violence: Not on file   Depression: Not at risk     PHQ-2 Score: 1   Housing Stability: Not on file       Functional Status:  Prior to admission patient needed assistance:   Dependent ADLs:: Ambulation-cane,Ambulation-walker,Wheelchair-independent  Dependent IADLs:: Shopping,Transportation       Mental Health Status:  Mental Health Status: No Current Concerns       Chemical Dependency Status:  Chemical Dependency Status: No Current Concerns             Values/Beliefs:  Spiritual, Cultural Beliefs, Confucianist Practices, Values that affect care: No           Additional Information:  Writer met with patient in the ED who was admitted due to a fall at home. Per notes, patient's oxygen tube became caught on a piece of furniture and fell. Patient A&O and pleasant. Patient reports she lives alone in an apartment on the main level. There are no steps to enter. Patient states she uses a cane, walker or wheelchair depending on how she feels.  At baseline pt uses 3.5L of oxygen. Pt also reports she has an ostomy too. Patient has been isolating herself since COVID and her son runs all the errands and drops off any meals. Pt is not vaccinated. Patient reports she has a [facility] she arranged after a hospital stay. Her  is Cassandra [phone] and has been helping coordinate plans. Discussed HC vs TCU as an option if PT/OT recommends it. Patient reports she has had home care and has been to [facility] TCU in the past. Patient feels TCU would likely not help her and hopes to return home. Writer also discussed since pt is not vaccinated, pt would have to quarantine at a TCU. Pt's son will provide transportation home.     Taryn CURIEL, [facility]  ED    [phone]  VEENA Acosta

## 2021-12-17 NOTE — CONSULTS
WOC Nurse Inpatient [facility]   WO Nurse Inpatient Adult Ostomy Assessment    Initial Assessment   Assessment of established  Colostomy Stoma complication(s) none   Mucocutaneous junction; intact   Peristomal complication(s) none   Pouch wear time:3-4 days,   Following today's visit:Patient  is  able to demonstrate;       1. How to empty their pouch? yes      2. How to change their pouch?  yes      3. How to read and record intake and output correctly? yes    Objective data:  Patient history according to medical record: Carmen Perez is a 71 year old female with history of COPD, chronic hypoxemic respiratory failure on 3.5 L O2, cor pulmonale, RV failure, anemia, and anxiety who is not vaccinated for COVID-19 because she has been isolating at home essentially for the last 2 years who presented after a fall after tripping on her nasal cannula tubing and was admitted on 12/16/2021 due to significant hypercapnia requiring BiPAP therapy in addition to suffering a left fourth proximal phalanx comminuted fracture.  Current Diet/Nutrition: Orders Placed This Encounter      Regular Diet Adult     TPN no   No intake/output data recorded.  Labs:  Recent Labs   Lab 12/16/21  0937   HGB 10.8*   WBC 10.9        Physical Exam:  Current pouching system:Coloplast   Reason for pouch change today: routine schedule  Stoma appearance: pink  Stoma size; 1 1/8 x 1 1/4 inches , protrudes 1.5cm  Peristomal skin: intact  Stoma output :brown and pasty   Abdominal  Assessment  soft   Interventions:  Patient's chart evaluated.  Focus of today's visit: refitting of appliance   Participant of teaching session today patient   Orders: Written  Change made with ostomy management today: No  Patient/family: observing  Supplies:Ordered pouches and rings    Plan:  Learning needs: none-established stoma  Preparation for discharge: Discussed when to follow up with a [facility] Nurse in the future  Recommend home care? no    Discussed plan of  care with Patient  Nursing to notify the Provider(s) and re-consult the WOC Nurse if new ostomy concerns or discharge planned before next planned WOC visit.    WOC Nurse will return: NA-no skin concerns, WOC will sign off  Face to face time: 10 minutes    Michel Rodriguez RN CWOCN

## 2021-12-17 NOTE — ED NOTES
notified of critical labs: PCO2: 93, bicarb 47. Per MD ok to keep pt off bi-pap at this time. Repeat blood gas this evening to determine if pt will need bi-pap overnight.

## 2021-12-17 NOTE — PROGRESS NOTES
12/17/21 1549   Quick Adds   Type of Visit Initial PT Evaluation   Living Environment   People in home alone   Current Living Arrangements apartment   Home Accessibility no concerns   Transportation Anticipated family or friend will provide   Living Environment Comments Patient reports that adult son lives in area; however very busy with his business and unable to provide assistance beyond delivering meals.    Self-Care   Usual Activity Tolerance fair   Current Activity Tolerance poor   Equipment Currently Used at Home cane, straight;walker, standard;wheelchair, manual   Activity/Exercise/Self-Care Comment Patient independent with all ADLs/iADLs at baseline, uses 3.5L O2 at home. Patient reports gradually decreased mobility over the past two years. Reports limited mobility due to chronic shortness of breath.    Disability/Function   Fall history within last six months yes   Number of times patient has fallen within last six months 1   Change in Functional Status Since Onset of Current Illness/Injury yes   General Information   Onset of Illness/Injury or Date of Surgery 12/16/21   Referring Physician John Harley MD   Patient/Family Therapy Goals Statement (PT) Return to home/long term care facility    Pertinent History of Current Problem (include personal factors and/or comorbidities that impact the POC) Per medical chart: Patient history according to medical record: Carmen Perez is a 71 year old female with history of COPD, chronic hypoxemic respiratory failure on 3.5 L O2, cor pulmonale, RV failure, anemia, and anxiety who is not vaccinated for COVID-19 because she has been isolating at home essentially for the last 2 years who presented after a fall after tripping on her nasal cannula tubing and was admitted on 12/16/2021 due to significant hypercapnia requiring BiPAP therapy in addition to suffering a left fourth proximal phalanx comminuted fracture.   Existing Precautions/Restrictions fall;oxygen  therapy device and L/min   General Observations L hand splinted   Cognition   Orientation Status (Cognition) oriented x 4   Affect/Mental Status (Cognition) anxious   Pain Assessment   Patient Currently in Pain No   Posture    Posture Forward head position;Protracted shoulders   Range of Motion (ROM)   ROM Quick Adds ROM WFL   Strength   Manual Muscle Testing Quick Adds Deficits observed during functional mobility   Strength Comments Patient with global weakness. Patient endorsing her mobility and strength has decreased over the last year secondary to being home bound.    Bed Mobility   Comment (Bed Mobility) Not assessed; patient in bedside chair upon arrival. O2 sats <90% with mobility next to chair.    Transfers   Transfer Safety Comments Patient performed sit <> stand transfer with FWW and min A; assist provided under L elbow secondary to left hand splint/pain. Verbal cues provided for hand placement.    Gait/Stairs (Locomotion)   Comment (Gait/Stairs) Patient ambulated 2 feet with FWW and close CGA. Patient unstable, reporting fatigue, O2 satus <90% on 4L. Patient with moderate reliance on RUE on walker for support, unable to use LUE for support secondary to splint and pain.    Balance   Balance Comments Patient falls risk due to history of fall.    Sensory Examination   Sensory Perception patient reports no sensory changes   Clinical Impression   Criteria for Skilled Therapeutic Intervention yes, treatment indicated   PT Diagnosis (PT) decreased independence with functional mobility    Influenced by the following impairments decreased strength, decreased activity tolerance, impaired balance    Functional limitations due to impairments difficulty with bed mobility, transfers, ambulation    Clinical Presentation Stable/Uncomplicated   Clinical Presentation Rationale Complex PMH, stable presentation, fair social support    Clinical Decision Making (Complexity) low complexity   Therapy Frequency (PT) 5x/week    Predicted Duration of Therapy Intervention (days/wks) 5 days    Planned Therapy Interventions (PT) bed mobility training;transfer training;patient/family education;gait training;progressive activity/exercise   Risk & Benefits of therapy have been explained evaluation/treatment results reviewed;care plan/treatment goals reviewed;risks/benefits reviewed;current/potential barriers reviewed;participants voiced agreement with care plan;participants included;patient   PT Discharge Planning    PT Discharge Recommendation (DC Rec) Long term care facility;Transitional Care Facility;home with assist   PT Rationale for DC Rec Patient performing below baseline. Patient currently requiring Ax1 with all mobility, independent at baseline. Patient reporting that she is not interested in physical therapy beyond the inpatient setting; would rather transition to a more supportive living environment. Recommend patient transitions to long term care facility or assisted living with Ax1 with all mobility. If unable to accommodate, patient would require TCU stay or return to home with 24/7 assistance, which she expresses her family (son) cannot provide.    PT Brief overview of current status  Ax1, limited activity tolerance    Total Evaluation Time   Total Evaluation Time (Minutes) 5

## 2021-12-17 NOTE — PHARMACY-ADMISSION MEDICATION HISTORY
Admission medication history interview status for this patient is complete. See [facility] admission navigator for allergy information, prior to admission medications and immunization status.     Medication history interview done, indicate source(s): Patient  Medication history resources (including written lists, pill bottles, clinic record):[employer]  Pharmacy: [facility]    Changes made to PTA medication list:  Added:   Changed: Effexor  Reported as Not Taking:   Removed:     Actions taken by pharmacist (provider contacted, etc):sticky note for md/    Additional medication history information:None    Medication reconciliation/reorder completed by provider prior to medication history? Y   (Y/N)         Prior to Admission medications    Medication Sig Last Dose Taking? Auth Provider   acetaminophen (TYLENOL) 500 MG tablet Take 2 tablets (1,000 mg) by mouth 2 times daily Past Week at Unknown time Yes Vicky Ye PA-C   calcium carb-cholecalciferol 600-500 MG-UNIT CAPS Take 1 tablet by mouth daily Past Week at Unknown time Yes Reported, Patient   COMBIVENT RESPIMAT  MCG/ACT inhaler INHALE 1 PUFF INTO THE LUNGS FOUR TIMES DAILY AS NEEDED FOR SHORTNESS OF BREATH OR DIFFICULT BREATHING OR WHEEZING Past Week at Unknown time Yes Vicky Ye PA-C   fluticasone-salmeterol (ADVAIR DISKUS) 250-50 MCG/DOSE inhaler Inhale 1 puff into the lungs 2 times daily Past Week at Unknown time Yes Jaron Cason MD   INCRUSE ELLIPTA 62.5 MCG/INH Inhaler INHALE 1 PUFF INTO THE LUNGS DAILY Past Week at Unknown time Yes Ilana Solano APRN CNP   venlafaxine (EFFEXOR-XR) 75 MG 24 hr capsule Take 1 capsule (75 mg) by mouth daily  Patient taking differently: Take 75 mg by mouth daily as needed In the past week patient has stopped taking this regularly Past Week at Unknown time Yes Vicky Ye PA-C

## 2021-12-17 NOTE — PROGRESS NOTES
[facility]  Hospitalist Progress Note  John Harley MD 12/17/21    Reason for Stay (Diagnosis): Fall, left phalanx fracture, COPD exacerbation         Assessment and Plan:      Summary of Stay: Carmen Perez is a 71 year old female with history of COPD, chronic hypoxemic respiratory failure on 3.5 L O2, cor pulmonale, RV failure, anemia, and anxiety who is not vaccinated for COVID-19 because she has been isolating at home essentially for the last 2 years who presented after a fall after tripping on her nasal cannula tubing and was admitted on 12/16/2021 due to significant hypercapnia requiring BiPAP therapy in addition to suffering a left fourth proximal phalanx comminuted fracture.  Placed on BiPAP due to uncompensated hypercapnia seen on VBG.  Orthopedics consulted recommended oral cephalexin and follow-up in clinic.  Hypercapnia improved to compensated levels with BiPAP overnight.  Started on IV Solu-Medrol for COPD exacerbation.  Will need to eval for need for NIPPV for home.    Problem List/Assessment and Plan:   Acute on chronic hypoxemic and hypercapnic respiratory failure due to COPD exacerbation: Chronically on 3.5 L oxygen via nasal cannula due to COPD and cor pulmonale.  At baseline she gets short of breath with movement in her home, does not go outside.  She has been feeling more tired and lethargic during the day which could be secondary to her significant hypercapnia seen initially with PCO2 114, pH 7.24, and bicarbonate 48 in the ER.  Clearly has some chronic hypercapnia likely secondary to her COPD.  PTA on Advair, Incruse Ellipta, and Combivent as needed.  Unclear if there is acute decompensation here not, but there may be as she has been feeling a little bit more short of breath and lethargic of late.  Chest x-ray does not show any infiltrate.  She is afebrile no leukocytosis.  COVID-19 PCR is negative, as above not vaccinated.  -PCO2 77 and compensated now after BiPAP overnight,  placed back on her home oxygen, do not over oxygenate with goal O2 sats greater than 90%, if significantly higher this could drive hypercapnia  -IV Solu-Medrol 40 mg twice daily  -Z-Tadeo  -DuoNebs 4 times daily  -Repeat VBG this afternoon and again tomorrow morning  -If continues to have issues with worsening hypercapnia may need to eval for home NIPPV for her advanced COPD  -We will plan to give COVID19 vaccine here prior to discharge, she is agreeable  Addendum: Repeat VBG shows pH 7.31, PCO2 93, PO2 35, bicarb 47 when off BiPAP in the morning. Remains alert and oriented. Recheck VBG at 9 PM and if worsening hypercapnia recommend BiPAP overnight and VBG in the morning. If similar levels okay to hold off on BiPAP if she would prefer.    Fall: Patient tripped on her nasal cannula.  She does have chronic deformities of both feet which likely contributed to her fall.  Found to have a mildly comminuted fracture of the fourth proximal left phalanx with 4.5 mm of dorsal displacement on x-ray.  Possibility of partially open fracture so antibiotics indicated.  -Orthopedics consulted, recommend oral cephalexin 500 mg 3 times daily for 10 days and follow-up in orthopedics clinic    Cor pulmonale: History of pulmonary hypertension and cor pulmonale.  Has trace bilateral lower extremity edema on exam.    Generalized weakness and fall: Consult PT and OT.  Does not move very much in her home.  Does not leave the home.    Ostomy: History of perforated diverticulitis in 2016.  Has ostomy.  No issues.  -WOC consult    Chronic anemia: Hemoglobin at baseline 10-11.    Anxiety: Reports ongoing issues with anxiety.  She stopped Lexapro about a week ago because she did not think it was helping her.  She is wondering what a pill just to put her at ease and make things calm.  Explained to her that medications for temporary relief such as Ativan as these can decrease respiratory drive and would not be a good idea with her hypercapnia.  She  voices understanding.  I recommend she follow-up with her primary care doctor.    DVT Prophylaxis: Enoxaparin (Lovenox) SQ  Code Status: Full Code  FEN: regular diet  Discharge Dispo: home, may need NIPPV as above  Estimated Disch Date / # of Days until Disch: 2 days        Interval History (Subjective):      Assumed care today.  Remained on BiPAP overnight due to hypercapnia.  VBG improved today.  Requested BiPAP be removed.  Appears comfortable on 4 l via nasal cannula.  Says she is chronically short of breath with intermittent coughing wheezing depending on the day.  Uses 3.5 L oxygen at home.  Has chronic issues with anxiety and stopped taking her venlafaxine a week ago because it was not working.  Denies any fevers.  Has mild pain in the left hand.                  Physical Exam:      Last Vital Signs:  /61   Pulse 90   Temp 97.8  F (36.6  C) (Oral)   Resp 26   SpO2 100%     No intake or output data in the 24 hours ending 12/17/21 1332    Constitutional: Awake, NAD   Eyes: sclera white   HEENT:   MMM  Respiratory: no focal crackles or wheeze, on 4 L via NC  Cardiovascular: RRR.  No murmur   GI: non-tender, not distended, bowel sounds present, ostomy  Skin: no rash    Musculoskeletal/extremities: Trace bilateral lower extremity edema  Neurologic: A&O, speech clear  Psychiatric: calm, cooperative, normal affect         Medications:      All current medications were reviewed with changes reflected in problem list.         Data:      All new lab and imaging data was reviewed.   Labs:  Initial VBG: pH 7.24, PCO2 114, bicarbonate 48, PO2 36  VBG after being on BiPAP overnight: pH 7.37, PCO2 77, PO2 40, HCO3 45    Recent Labs   Lab 12/17/21  0639 12/16/21  0937    144   POTASSIUM 4.4 4.4   CHLORIDE 98 99   CO2 36* 43*   ANIONGAP 5 2*   GLC 97 111*   BUN 27 13   CR 0.56 0.52   GFRESTIMATED >90 >90   OLI 9.0 9.3     Recent Labs   Lab 12/16/21  0937   WBC 10.9   HGB 10.8*   HCT 39.3   *          Imaging:   Recent Results (from the past 24 hour(s))   Head CT w/o contrast    Narrative    CT SCAN OF THE HEAD WITHOUT CONTRAST   12/16/2021 2:04 PM     HISTORY: Fall, head injury.    TECHNIQUE:  Axial images of the head and coronal reformations without  IV contrast material. Radiation dose for this scan was reduced using  automated exposure control, adjustment of the mA and/or kV according  to patient size, or iterative reconstruction technique.    COMPARISON: Head CT 4/21/2019.    FINDINGS:  Mild volume loss is present. White matter hypoattenuation likely  represents mild chronic small vessel ischemic change. The cerebral  hemispheres, brainstem, and cerebellum otherwise demonstrate normal  morphology and attenuation. No evidence of acute ischemia, hemorrhage,  mass, mass effect or hydrocephalus. The visualized calvarium, tympanic  cavities, mastoid cavities, and paranasal sinuses are unremarkable.  Bilateral lens replacements.      Impression    IMPRESSION:   No acute intracranial abnormality.    CARMELINA CHAVEZ MD         SYSTEM ID:  [id number]         John Harley MD

## 2021-12-18 NOTE — PROGRESS NOTES
[facility]  Hospitalist Progress Note  John Harley MD 12/18/21    Reason for Stay (Diagnosis): Fall, left phalanx fracture, COPD exacerbation         Assessment and Plan:      Summary of Stay: Carmen Perez is a 71 year old female with history of COPD, chronic hypoxemic respiratory failure on 3.5 L O2, cor pulmonale, RV failure, anemia, and anxiety who is not vaccinated for COVID-19 because she has been isolating at home essentially for the last 2 years who presented after a fall after tripping on her nasal cannula tubing and was admitted on 12/16/2021 due to significant hypercapnia requiring BiPAP therapy in addition to suffering a left fourth proximal phalanx comminuted fracture.  Placed on BiPAP due to uncompensated hypercapnia seen on VBG.  Orthopedics consulted recommended oral cephalexin and follow-up in clinic.  Hypercapnia improved to compensated levels with BiPAP overnight.  Started on IV Solu-Medrol for COPD exacerbation.  Still with hypercapnia, but more compensated and she is not interested in NIPPV for home.  Solu-Medrol changed to prednisone.  Intermittently in A. fib versus SVT with tachycardia on telemetry, starting oral diltiazem.  TTE pending.  PT and OT consulted and recommend TCU which she is currently agreeable to.    Problem List/Assessment and Plan:   Acute on chronic hypoxemic and hypercapnic respiratory failure due to COPD exacerbation: Chronically on 3.5 L oxygen via nasal cannula due to COPD and cor pulmonale.  At baseline she gets short of breath with movement in her home, does not go outside.  She has been feeling more tired and lethargic during the day which could be secondary to her significant hypercapnia seen initially with PCO2 114, pH 7.24, and bicarbonate 48 in the ER.  Clearly has some chronic hypercapnia likely secondary to her COPD.  PTA on Advair, Incruse Ellipta, and Combivent as needed.  Unclear if there is acute decompensation here not, but there may be  "as she has been feeling a little bit more short of breath and lethargic of late.  Chest x-ray does not show any infiltrate.  She is afebrile no leukocytosis.  COVID-19 PCR is negative, as above not vaccinated.  -Change IV Solu-Medrol to prednisone 60 mg daily  -Z-Tadeo  -DuoNebs 4 times daily  -Still with hypercapnia with PCO2 90, but more compensated and she is not interested in NIPPV for home as it makes her feel very claustrophobic.  She is okay with the possibility of worsening hypercapnia resulting in somnolence or potential coma and death and thinks this would be an \"ok way to go\"  -She would be agreeable to a COVID19 vaccine before discharge    Fall: Patient tripped on her nasal cannula.  She does have chronic deformities of both feet which likely contributed to her fall.  Found to have a mildly comminuted fracture of the fourth proximal left phalanx with 4.5 mm of dorsal displacement on x-ray.  Possibility of partially open fracture so antibiotics indicated.  -Orthopedics consulted, recommend oral cephalexin 500 mg 3 times daily for 10 days and follow-up in orthopedics clinic    Cor pulmonale: History of pulmonary hypertension and cor pulmonale.  Has trace bilateral lower extremity edema on exam.    New A. fib versus SVT: Intermittently tachycardic with a rate around 130, other times in NSR with normal rates.  Telemetry and EKG difficult to assess whether this is A. fib or other SVT.  No necessarily definitive P waves, but regular tachycardia.  -TTE pending  -Starting oral diltiazem 15 mg every 6 hours, avoiding beta-blockers due to advanced COPD  -Not definitively A. fib and patient would like to minimize pill burden so did not start anticoagulation    Generalized weakness and fall: Consult PT and OT.  Does not move very much in her home.  Does not leave the home.    Ostomy: History of perforated diverticulitis in 2016.  Has ostomy.  No issues.  -WOC consult    Chronic anemia: Hemoglobin at baseline " "10-11.    Anxiety: Reports ongoing issues with anxiety.  She stopped Lexapro about a week ago because she did not think it was helping her.  She is wondering what a pill just to put her at ease and make things calm.  Explained to her that medications for temporary relief such as Ativan as these can decrease respiratory drive and would not be a good idea with her hypercapnia.  She voices understanding.  I recommend she follow-up with her primary care doctor.    DVT Prophylaxis: Enoxaparin (Lovenox) SQ  Code Status: Full Code  FEN: regular diet  Discharge Dispo: PT and OT consulted.  Patient is agreeable to TCU at this time.  Estimated Disch Date / # of Days until Disch: 1-2 days pending cardiac work-up and heart rate control, starting diltiazem        Interval History (Subjective):      Tachycardic overnight.  Obtained EKG and placed on telemetry.  Difficult to tell if intermittent A. fib or SVT, at other times in NSR.  She feels better today and does not feel short of breath.  She is on 3 L oxygen via nasal cannula.  She did not wear BiPAP last night.  PCO2 is high at 90, but pH is 7.32.  She says she is not interested in BiPAP moving forward because makes her feel claustrophobic.  PT is recommending TCU and patient is agreeable to this.  Starting diltiazem.                  Physical Exam:      Last Vital Signs:  /47 (BP Location: Right arm, Patient Position: Chair)   Pulse 70   Temp 97.5  F (36.4  C) (Temporal)   Resp 20   Ht 1.6 m (5' 3\")   Wt 54.4 kg (120 lb)   SpO2 100%   BMI 21.26 kg/m      Intake/Output Summary (Last 24 hours) at 12/18/2021 1430  Last data filed at 12/17/2021 2100  Gross per 24 hour   Intake 240 ml   Output --   Net 240 ml       Constitutional: Awake, NAD   Eyes: sclera white   HEENT:   MMM  Respiratory: Decreased breath sounds globally, no focal crackles or wheeze, on 3 L via NC  Cardiovascular: Tachycardic, regular rhythm, no murmur  GI: non-tender, not distended, bowel sounds " present, ostomy  Skin: Ecchymosis involving fingers on the left hand  Musculoskeletal/extremities: Trace bilateral lower extremity edema.  Left and Ace wrap  Neurologic: A&O, speech clear  Psychiatric: calm, cooperative, normal affect         Medications:      All current medications were reviewed with changes reflected in problem list.         Data:      All new lab and imaging data was reviewed.   Labs:  VBG: pH 7.32, PCO2 90, PO2 26, bicarb 46      Recent Labs   Lab 12/18/21  0659 12/17/21  0639 12/16/21  0937    139 144   POTASSIUM 4.8 4.4 4.4   CHLORIDE 98 98 99   CO2 41* 36* 43*   ANIONGAP <1* 5 2*   * 97 111*   BUN 27 27 13   CR 0.53 0.56 0.52   GFRESTIMATED >90 >90 >90   OLI 9.1 9.0 9.3     Recent Labs   Lab 12/16/21  0937   WBC 10.9   HGB 10.8*   HCT 39.3   *         Imaging:   TTE pending    John Harley MD

## 2021-12-18 NOTE — PLAN OF CARE
Ax1 with walker and gait belt. A&Ox4.   VSS. Tele tech reported HR of 140 - 170's. Dr order of Metoprolol given .02 - 97% on 4L via Nasal Canula. Afebrile.   IV flushed at 0000. Would not flush for later ordered flushes. Some streaked blood.  Lab tests for ABG, magnesium.   Pt back on regular diet and breakfast ordered.  Used bedside commode. Colostomy at LLQ of abdomen. Regular diet.

## 2021-12-18 NOTE — CONSULTS
Care Management Follow Up    Length of Stay (days): 2    Expected Discharge Date: 12/19/2021     Concerns to be Addressed:   TCU/ long term care         Anticipated Discharge Disposition: TCU      Anticipated Discharge Services:  TCU    Patient/family educated on Medicare website which has current facility and service quality ratings:  yes  Education Provided on the Discharge Plan: TCU services and insurance coverage    Patient/Family in Agreement with the Plan:  yes    Referrals Placed by CM/SW:  TCUs  Private pay costs discussed: private room/amenity fees    Additional Information:  Sw met with patient who was sitting in her chair. SW discussed the recommendations of TCU vs long term care. Patient initially talked about wanting to go home with services. Her main concern of wanting to go home is that her son would have too much stress in finding her clothing and brining it to her. She does not want to cause him any stress. She does not think he would be able to find her items even if she told him over the phone. SW discussed the benfits of TCU.  also discussed that if she went home and had to come back to the ED, if that would cause too much stress on her son. Patient then noted she would be open to TCU and wants the help. She does not think she has been to TCU this year. Per chart review, she had Home Care in the beginning of the year.  explained the coverage including that she would need a private room. Sw noted we will advocate for the private room fee to be waived since she is not vaccinated and would have to quarantine. She reports she has been in isolation for 3 years, so she is not concerned about the quarantine. She reports the SW can send referrals to places that have 5 stars.     Patient also had some questions about the coverage of her CADI waiver. SW noted her insurance covers the TCU, not the CADI. She gave verbal permission for LARISSA to talk with her son.     Referrals sent to 5  star rating facilities on the Medicare Website. Patient was educated on medicare website and given SNF packet.     Addendum 1344- LARISSA called [facility] and the patient has been accepted for a private room with the medicare private room fee waived due to her being unvaccinated. They would like LARISSA to call back in the morning of 12/19/2021 for a date and time the patient can go.     Amna Lewis, VEENA, [facility]  , ED Care Management   [phone]      Amna Lewis

## 2021-12-18 NOTE — PROVIDER NOTIFICATION
[facility] results in. critical value. Please advise.  Thanks.  Sent to admitting pager via web based page and charge nurse updated.

## 2021-12-18 NOTE — PLAN OF CARE
DATE:  12/18/2021   TIME OF RECEIPT FROM LAB:  0730  LAB TEST:  blood gases   LAB VALUE:  PCO2 90, Bicarb 46  RESULTS GIVEN WITH READ-BACK TO (PROVIDER):  admitting pager hospitalist not logged in.   TIME LAB VALUE REPORTED TO PROVIDER:   0721. Pt refusing bipap, stable. Will keep monitoring.

## 2021-12-18 NOTE — PROGRESS NOTES
Contacted for telemetry orders. I rechecked a VBG to follow trend on one obtained at 9 pm. Her VBG shows improvement w/o BiPAP. Does not need IMC at this time.

## 2021-12-18 NOTE — PROGRESS NOTES
Supervisor contacted to speak with Dr. The  reinforced that he is unable to speak with the patient at this time. Pt refusing to transfer to another floor and start BIPAP treatment without speaking with the  about her medications. At this time, pt has right to refuse treatments. Dr confirmed that if that is what the patient wants, he is OK with the patient staying on the floor.

## 2021-12-18 NOTE — PROGRESS NOTES
Care Management Follow Up    Length of Stay (days): 2    Expected Discharge Date: 12/19/2021     Concerns to be Addressed:       Patient plan of care discussed at interdisciplinary rounds: Yes    Anticipated Discharge Disposition:  TCU     Anticipated Discharge Services:  TCU  Anticipated Discharge DME:  TCU will supply    Patient/family educated on Medicare website which has current facility and service quality ratings:  yes  Education Provided on the Discharge Plan:  yes  Patient/Family in Agreement with the Plan:  yes    Referrals Placed by CM/SW:  Yes for TCU  Private pay costs discussed: private room/amenity fees and transportation costs    Additional Information:  [facility]. She is in agreement to go to TCU for rehab and to get stronger. [facility] and that I will let her know when she would leave as I'm waiting for doctor to all to see if she can leave.  would like to keep the patient for one to two days longer. Spoke to [facility] and they will hold the TCU for now.  PAS: [phone]  Sw will continue to follow for discharge planning.    MARILEE Hamilton   Inpatient Care Coordination   Supervisor  [facility]  [phone]      NELL Cosme

## 2021-12-18 NOTE — PLAN OF CARE
Report given to Krystina on ortho spine and Tenzin on 3rd floor. Pt informed of [place] [facility] [place] and tele[place]. Denies pain. Oxygen 4l nc and sats 98%. No dyspnea at this time. Cms intact to left fingers and spint dry. Pt sleeping without difficulty.

## 2021-12-18 NOTE — PLAN OF CARE
Supraventricular tachycardia with occasional premature ventricular complexes on EKG, echocardiogram done, on telemetry. New order for diltiazem in place, 1st dose administered. Up in the chair with VANDANA walker GB. Colostomy intact, denies pain, peripheral in place after some encouragement. Will keep monitoring.

## 2021-12-18 NOTE — PROGRESS NOTES
XC    Notified of Afib on telemetry. Tele reviewed, rate about 110-115. No hx Afib per chart review. Somewhat soft BP but will attempt 25 mg PO metoprolol x1. Redose pending response. Check AM labs. Defer AC to obi.    Salazar Briceño, DO  December 18, 2021

## 2021-12-18 NOTE — PROGRESS NOTES
12/18/21 0948   Quick Adds   Type of Visit Initial Occupational Therapy Evaluation   Living Environment   People in home alone   Current Living Arrangements apartment   Home Accessibility no concerns   Transportation Anticipated family or friend will provide   Living Environment Comments very limited support from family. She reports she has someone that comes 2x/week to assist with bathing    Self-Care   Usual Activity Tolerance fair   Current Activity Tolerance poor   Equipment Currently Used at Home cane, straight;walker, standard;wheelchair, manual   Activity/Exercise/Self-Care Comment reports decreased (I) with ADLs at home in the last 2-3 years    Instrumental Activities of Daily Living (IADL)   Previous Responsibilities medication management   IADL Comments she has assistance with cooking/cleaning/bed changes. She does not drive    Disability/Function   Hearing Difficulty or Deaf no   Wear Glasses or Blind yes   Concentrating, Remembering or Making Decisions Difficulty no   Difficulty Communicating no   Fall history within last six months yes   Number of times patient has fallen within last six months 1   Change in Functional Status Since Onset of Current Illness/Injury yes   General Information   Onset of Illness/Injury or Date of Surgery 12/16/21   Referring Physician John Harley MD   Patient/Family Therapy Goal Statement (OT) safe discharge planning   Additional Occupational Profile Info/Pertinent History of Current Problem Per medical chart: Patient history according to medical record: Carmen Perez is a 71 year old female with history of COPD, chronic hypoxemic respiratory failure on 3.5 L O2, cor pulmonale, RV failure, anemia, and anxiety who is not vaccinated for COVID-19 because she has been isolating at home essentially for the last 2 years who presented after a fall after tripping on her nasal cannula tubing and was admitted on 12/16/2021 due to significant hypercapnia requiring BiPAP  therapy in addition to suffering a left fourth proximal phalanx comminuted fracture.   General Observations and Info on 4L NC at baseline   Cognitive Status Examination   Orientation Status orientation to person, place and time   Affect/Mental Status (Cognitive) anxious   Cognitive Status Comments patient is anxious, impacts her ability to adequately state her wants specifically with discharge    Pain Assessment   Patient Currently in Pain No   Range of Motion Comprehensive   General Range of Motion no range of motion deficits identified   Strength Comprehensive (MMT)   Comment, General Manual Muscle Testing (MMT) Assessment generalized decreased strength    Transfers   Transfers toilet transfer   Toilet Transfer   Type (Toilet Transfer) sit-stand;stand-sit   [place] Level (Toilet Transfer) minimum assist (75% patient effort)   Activities of Daily Living   BADL Assessment lower body dressing;toileting   Lower Body Dressing Assessment   [place] Level (Lower Body Dressing) maximum assist (25% patient effort)   Position (Lower Body Dressing) edge of bed sitting   Toileting   [place] Level (Toileting) moderate assist (50% patient effort)   Assistive Devices (Toileting) commode chair   Clinical Impression   Criteria for Skilled Therapeutic Interventions Met (OT) yes;meets criteria;skilled treatment is necessary   OT Diagnosis weakness, decreased ADLs   OT Problem List-Impairments impacting ADL problems related to;activity tolerance impaired;range of motion (ROM);balance   ADL comments/analysis LE dressing, toilieting, g/h, showering   Assessment of Occupational Performance 5 or more Performance Deficits   Identified Performance Deficits strength, anxiety, respiratory, decreased strength, poor activity tolerance   Planned Therapy Interventions (OT) ADL retraining;strengthening;transfer training   Clinical Decision Making Complexity (OT) low complexity   Therapy Frequency (OT) 4x/week   Predicted Duration of  Therapy 3 days   Anticipated Equipment Needs Upon Discharge (OT) raised toilet seat;reacher   Risk & Benefits of therapy have been explained evaluation/treatment results reviewed;care plan/treatment goals reviewed;participants included;patient   OT Discharge Planning    OT Discharge Recommendation (DC Rec) Transitional Care Facility;Long term care facility;home with home care occupational therapy   OT Rationale for DC Rec OT: Patient is performing significantly below baseline. Her anxiety impacts her decision making for discharge planning, on today's date is very hopeful for a rehab stay. Would benefit from TCU to gain strength and maximize (I) with ADLs and return to PLOF. Pending progress determine long term discharge plan. If discharge home would require 24/7 assistance and would benefit from home health OT. Would continue to benefit from IP OT services to progress in strength and (I) with ADLs    Total Evaluation Time (Minutes)   Total Evaluation Time (Minutes) 10

## 2021-12-19 NOTE — PLAN OF CARE
"A&O x4. VSS, Tele sinus tach. Pulse improved throughout shift after scheduled diltiazem that started today. Pt states that \"this medication really made me feel better!\" 4L nasal cannula. Regular diet. A1 with gb and walker. LLQ colostomy intact. Pain managed with scheduled tylenol. Going to TCU at discharge in 1-2 days after cardiac workup complete.   "

## 2021-12-19 NOTE — DISCHARGE SUMMARY
[facility]  Discharge Summary  Name: Carmen Perez    MRN: [id number]  YOB: 1950    Age: 71 year old  Date of Discharge:  12/19/2021  Date of Admission: 12/16/2021  Primary Care Provider: Vicky Ye  Discharge Physician:  John Harley MD  Discharging Service:  Hospitalist      Discharge Diagnoses:  Acute on chronic hypoxemic and hypercapnic respiratory failure  Advanced COPD with acute exacerbation  Fall  Open fourth proximal left phalanx fracture  Mild to moderate pulmonary HTN  Paroxysmal SVT  Physical deconditioning  Chronic anemia  Anxiety  Ostomy     Hospital Course:  Summary of Stay: Carmen Perez is a 71 year old female with history of COPD, chronic hypoxemic respiratory failure on 3.5 L O2, mild to moderate pulmonary HTN, anemia, and anxiety who is not vaccinated for COVID-19 because she has been isolating at home essentially for the last 2 years who presented after a fall after tripping on her nasal cannula tubing and was admitted on 12/16/2021 due to significant hypercapnia requiring BiPAP therapy in addition to suffering a left fourth proximal phalanx comminuted fracture.  Placed on BiPAP due to uncompensated hypercapnia seen on VBG.  Orthopedics consulted recommended oral cephalexin and follow-up in clinic.  Hypercapnia improved to compensated levels with BiPAP overnight.  Started on IV Solu-Medrol for COPD exacerbation.  Still with hypercapnia, but more compensated and she is not interested in NIPPV for home.  Solu-Medrol changed to prednisone.  Intermittently in A. fib versus SVT with tachycardia on telemetry, starting oral diltiazem.  TTE pending.  PT and OT consulted and recommend TCU which she is currently agreeable to.     Problem List/Assessment and Plan:   Acute on chronic hypoxemic and hypercapnic respiratory failure due to COPD exacerbation: Chronically on 3.5 L oxygen via nasal cannula due to COPD and mild to moderate pulmonary HTN. At baseline she  "gets short of breath with movement in her home, does not go outside.  She has been feeling more tired and lethargic during the day which could be secondary to her significant hypercapnia seen initially with PCO2 114, pH 7.24, and bicarbonate 48 in the ER.  Clearly has some chronic hypercapnia likely secondary to her COPD.  PTA on Advair, Incruse Ellipta, and Combivent as needed.  Unclear if there is acute decompensation here not, but there may be as she has been feeling a little bit more short of breath and lethargic of late.  Chest x-ray does not show any infiltrate.  She is afebrile no leukocytosis.  COVID-19 PCR is negative, as above not vaccinated.  -Initially on IV Solu-Medrol and now on prednisone 60 mg daily with improved shortness of breath, continue prednisone taper starting at 60 mg and decrease every 3 days over the next 10 days  -Finish Z-Tadeo, one additional dose tomorrow  -Continue her PTA inhalers  -Continue her chronic 3.5 L oxygen via nasal cannula prescription  -Still with hypercapnia with PCO2 90, but more compensated and she is not interested in NIPPV for home as it makes her feel very claustrophobic.  She is okay with the possibility of worsening hypercapnia resulting in somnolence or potential coma and death and thinks this would be an \"ok way to go\"  -If able get first dose of COVID19 vaccine at [facility]     Fall, open fourth left phalanx fracture: Patient tripped on her nasal cannula.  She does have chronic deformities of both feet which likely contributed to her fall.  Found to have a mildly comminuted fracture of the fourth proximal left phalanx with 4.5 mm of dorsal displacement on x-ray.  Possibility of partially open fracture so antibiotics indicated.  -Orthopedics consulted, recommend oral cephalexin 500 mg 3 times daily for 10 days and follow-up in [facility] orthopedics clinic early this upcoming week between 12/20/2021 and 12/22/2021, phone number for appointment in discharge " instructions     Mild to moderate pulmonary HTN: History of mild to moderate pulmonary hypertension.  TTE here obtained that shows EF 55-60%, mild to moderate pulmonary HTN, dilated IVC, one-2+ MR, and 1+ TR.     Paroxysmal SVT: Intermittently tachycardic with a rate around 130, other times in NSR with normal rates.  Telemetry and EKG difficult to assess whether this is A. fib or other SVT, but given the tachycardia is very regular and marches out on EKG more likely SVT other than A. fib.  -Started on oral diltiazem here and heart rate improved when she is in SVT.  Continue diltiazem extended release 120 mg daily  -Not definitively A. fib and patient would like to minimize pill burden so did not start anticoagulation     Physical deconditioning and fall: Consult PT and OT.  Recommend TCU.     Ostomy: History of perforated diverticulitis in 2016.  Has ostomy.  No issues.     Chronic anemia: Hemoglobin at baseline 10-11.     Anxiety: Reports ongoing issues with anxiety.  She stopped Lexapro about a week ago because she did not think it was helping her.  She is wondering what a pill just to put her at ease and make things calm.  Explained to her that medications for temporary relief such as Ativan as these can decrease respiratory drive and would not be a good idea with her hypercapnia.  She voices understanding.  I recommend she follow-up with her primary care doctor.     Discharge Disposition:  Discharged to rehabilitation facility     Allergies:  No Known Allergies     Discharge Medications:   Current Discharge Medication List      START taking these medications    Details   azithromycin (ZITHROMAX) 250 MG tablet Take 1 tablet (250 mg) by mouth daily for 1 day    Associated Diagnoses: COPD exacerbation (H)      cephALEXin (KEFLEX) 500 MG capsule Take 1 capsule (500 mg) by mouth 3 times daily for 7 days    Associated Diagnoses: Open displaced fracture of proximal phalanx of left ring finger, initial encounter     "  diltiazem ER COATED BEADS (CARDIZEM CD/CARTIA XT) 120 MG 24 hr capsule Take 1 capsule (120 mg) by mouth daily    Associated Diagnoses: Paroxysmal supraventricular tachycardia (H)      predniSONE (DELTASONE) 20 MG tablet Take 3 tabs by mouth daily x 3 days, then 2 tabs daily x 3 days, then 1 tab daily x 3 days, then 1/2 tab daily x 3 days.  Qty: 20 tablet, Refills: 0    Associated Diagnoses: COPD exacerbation (H)         CONTINUE these medications which have NOT CHANGED    Details   acetaminophen (TYLENOL) 500 MG tablet Take 2 tablets (1,000 mg) by mouth 2 times daily    Comments: In morning and evening      calcium carb-cholecalciferol 600-500 MG-UNIT CAPS Take 1 tablet by mouth daily      COMBIVENT RESPIMAT  MCG/ACT inhaler INHALE 1 PUFF INTO THE LUNGS FOUR TIMES DAILY AS NEEDED FOR SHORTNESS OF BREATH OR DIFFICULT BREATHING OR WHEEZING  Qty: 4 g, Refills: 0    Associated Diagnoses: Panlobular emphysema (H); COPD, severe (H)      fluticasone-salmeterol (ADVAIR DISKUS) 250-50 MCG/DOSE inhaler Inhale 1 puff into the lungs 2 times daily  Qty: 1 Inhaler, Refills: 1    Associated Diagnoses: Panlobular emphysema (H); COPD, severe (H)      INCRUSE ELLIPTA 62.5 MCG/INH Inhaler INHALE 1 PUFF INTO THE LUNGS DAILY  Qty: 1 Inhaler, Refills: 1    Associated Diagnoses: Panlobular emphysema (H); COPD, severe (H)         STOP taking these medications       venlafaxine (EFFEXOR-XR) 75 MG 24 hr capsule Comments:   Reason for Stopping:                Condition on Discharge:  Discharge condition: Stable   Discharge vitals: Blood pressure 115/58, pulse 71, temperature (!) 96.5  F (35.8  C), temperature source Temporal, resp. rate 20, height 1.6 m (5' 3\"), weight 54.4 kg (120 lb), SpO2 98 %, not currently breastfeeding.   Code status on discharge: Full Code     History of Illness:  See detailed admission note for full details.    Physical Exam:  Blood pressure 115/58, pulse 71, temperature (!) 96.5  F (35.8  C), temperature " "source Temporal, resp. rate 20, height 1.6 m (5' 3\"), weight 54.4 kg (120 lb), SpO2 98 %, not currently breastfeeding.  Wt Readings from Last 1 Encounters:   12/17/21 54.4 kg (120 lb)     Constitutional: Awake, NAD   Eyes: sclera white   HEENT:   MMM  Respiratory: Decreased breath sounds globally,  but no focal crackles or wheeze, on 3.5 L via NC  Cardiovascular: Tachycardic, regular rhythm, no murmur  GI: non-tender, not distended, bowel sounds present, ostomy  Skin: Ecchymosis involving fingers on the left hand  Musculoskeletal/extremities: Trace bilateral lower extremity edema.  Left hand in Ace wrap  Neurologic: A&O, speech clear, slight tremor  Psychiatric: calm, cooperative, normal affect    Procedures other than Imaging:  None     Imaging:  Results for orders placed or performed during the hospital encounter of 12/16/21   Head CT w/o contrast    Narrative    CT SCAN OF THE HEAD WITHOUT CONTRAST   12/16/2021 2:04 PM     HISTORY: Fall, head injury.    TECHNIQUE:  Axial images of the head and coronal reformations without  IV contrast material. Radiation dose for this scan was reduced using  automated exposure control, adjustment of the mA and/or kV according  to patient size, or iterative reconstruction technique.    COMPARISON: Head CT 4/21/2019.    FINDINGS:  Mild volume loss is present. White matter hypoattenuation likely  represents mild chronic small vessel ischemic change. The cerebral  hemispheres, brainstem, and cerebellum otherwise demonstrate normal  morphology and attenuation. No evidence of acute ischemia, hemorrhage,  mass, mass effect or hydrocephalus. The visualized calvarium, tympanic  cavities, mastoid cavities, and paranasal sinuses are unremarkable.  Bilateral lens replacements.      Impression    IMPRESSION:   No acute intracranial abnormality.    CARMELINA CHAVEZ MD         SYSTEM ID:  [id number]   XR Chest Port 1 View    Narrative    CHEST ONE VIEW PORTABLE   12/16/2021 1:13 PM     HISTORY: " Shortness of breath, history of COPD.    COMPARISON: 2016      Impression    IMPRESSION: Hyperexpanded lungs with coarsened interstitial markings  compatible with COPD. No lobar consolidation, pneumothorax, or pleural  effusion.    NICKY BRODERICK MD         SYSTEM ID:  [id number]   XR Hand Port Left G/E 3 Views    Narrative    HAND PORTABLE LEFT THREE OR MORE VIEWS    2021 1:13 PM     HISTORY:  Bruising pain after fall.    FINDINGS: Osteopenia. Scattered areas of degenerative arthrosis. Old  ununited ulnar styloid fracture.      Impression    IMPRESSION: Mildly comminuted fracture of the fourth proximal phalanx,  including 4.5 mm of dorsal displacement. There is apex volar  angulation, and mild impaction as well.    WILLY MONTEIRO MD         SYSTEM ID:  [id number]   Echocardiogram Complete     Value    LVEF  55-60%    Narrative    [id number]  [id number]  [id number]  [id number]^JORGE^ANNMARIE^LORI     [facility]  Echocardiography Laboratory  [address] [place] [address]     Name: GEOFF BRUCE  MRN: [id number]  : 1950  Study Date: 2021 01:47 PM  Age: 71 yrs  Gender: Female  Patient Location: [facility]  Reason For Study: Atrial Fibrillation, Dyspnea, Cor Pulmonale  Ordering Physician: ANNMARIE PATEL  Performed By: Karyn Gutierrez     BSA: 1.6 m2  Height: 63 in  Weight: 120 lb  HR: 55  BP: 120/74 mmHg  ______________________________________________________________________________  Procedure  Complete Portable Echo Adult.  ______________________________________________________________________________  Interpretation Summary     The visual ejection fraction is 55-60%.  Left ventricular systolic function is normal.  There is mild to moderate (1-2+) mitral regurgitation.  There is mild (1+) tricuspid regurgitation.  There is trace aortic regurgitation.  Borderline aortic root dilatation.  The echo is similar to previously except the inferior vena cava is more  dilated. The study  was technically difficult.  ______________________________________________________________________________  Left Ventricle  The left ventricle is normal in size. There is normal left ventricular wall  thickness. Diastolic Doppler findings (E/E' ratio and/or other parameters)  suggest left ventricular filling pressures are indeterminate. The visual  ejection fraction is 55-60%. Left ventricular systolic function is normal.  Septal motion is consistent with conduction abnormality.     Right Ventricle  The right ventricle is normal in size and function.     Atria  Normal left atrial size. Right atrial size is normal. There is no color  Doppler evidence of an atrial shunt.     Mitral Valve  There is mild to moderate (1-2+) mitral regurgitation.     Tricuspid Valve  There is mild (1+) tricuspid regurgitation. The right ventricular systolic  pressure is approximated at 28.9 mmHg plus the right atrial pressure. IVC  diameter >2.1 cm collapsing <50% with sniff suggests a high RA pressure  estimated at 15 mmHg or greater. Right ventricular systolic pressure is  elevated, consistent with mild to moderate pulmonary hypertension.     Aortic Valve  The aortic valve is trileaflet. There is trace aortic regurgitation. No  hemodynamically significant valvular aortic stenosis.     Pulmonic Valve  There is trace pulmonic valvular regurgitation. Normal pulmonic valve  velocity.     Vessels  Borderline aortic root dilatation. Normal size ascending aorta. IVC diameter  >2.1 cm collapsing <50% with sniff suggests a high RA pressure estimated at 15  mmHg or greater.     Pericardium  There is no pericardial effusion.     Rhythm  Sinus rhythm was noted. The patient exhibited occasional PACs.  ______________________________________________________________________________  MMode/2D Measurements & Calculations     IVSd: 1.1 cm  LVIDd: 3.9 cm  LVIDs: 3.1 cm  LVPWd: 0.91 cm  FS: 22.2 %  LV mass(C)d: 119.5 grams  LV mass(C)dI: 76.8 grams/m2  Ao  root diam: 3.7 cm  LA dimension: 3.1 cm  asc Aorta Diam: 3.4 cm  LA/Ao: 0.84  LVOT diam: 1.9 cm  LVOT area: 2.8 cm2  LA Volume (BP): 44.6 ml  LA Volume Index (BP): 28.6 ml/m2  RWT: 0.46     Doppler Measurements & Calculations  MV E max linda: 77.1 cm/sec  MV A max linda: 75.8 cm/sec  MV E/A: 1.0  MV max PG: 3.1 mmHg  MV mean P.0 mmHg  MV V2 VTI: 29.2 cm  MVA(VTI): 2.2 cm2  MV P1/2t max linda: 90.1 cm/sec  MV P1/2t: 103.1 msec  MVA(P1/2t): 2.1 cm2  MV dec slope: 256.0 cm/sec2  MV dec time: 0.14 sec  AI P1/2t: 297.2 msec  LV V1 max P.4 mmHg  LV V1 max: 105.0 cm/sec  LV V1 VTI: 22.2 cm  SV(LVOT): 62.9 ml  SI(LVOT): 40.4 ml/m2  PA acc time: 0.13 sec  TR max linda: 269.0 cm/sec  TR max P.9 mmHg  E/E' av.1  Lateral E/e': 7.3  Medial E/e': 10.9     ______________________________________________________________________________  Report approved by: Re Sebastian 2021 03:20 PM                Consultations:  Consultation during this admission received from orthopedics.       Recent Lab Results:  Recent Labs   Lab 21  0659 21  0409 216 21  1921 21  1447   PH  --   --   --   --  7.32   PHV 7.32 7.34 7.29*   < >  --    PO2V 26 65* 58*   < >  --    PCO2V 90* 82* 92*   < >  --    HCO3V 46* 44* 44*   < > 47*    < > = values in this interval not displayed.     Recent Labs   Lab 21  0612 21  0937   WBC  --  10.9   HGB  --  10.8*   HCT  --  39.3   MCV  --  101*    371     Recent Labs   Lab 21  0612 21  0659 21  0639 21  0937   NA  --  139 139 144   POTASSIUM  --  4.8 4.4 4.4   CHLORIDE  --  98 98 99   CO2  --  41* 36* 43*   ANIONGAP  --  <1* 5 2*   GLC  --  118* 97 111*   BUN  --  27 27 13   CR 0.59 0.53 0.56 0.52   GFRESTIMATED >90 >90 >90 >90   OLI  --  9.1 9.0 9.3     No results for input(s): NTBNPI, NTBNP in the last 168 hours.       Pending Results:    Unresulted Labs Ordered in the Past 30 Days of this Admission     No orders  found from 11/16/2021 to 12/17/2021.         These results will be followed up by patient's primary care provider.    Discharge Instructions and Follow-Up:   Discharge Procedure Orders   Wound care and dressings   Order Comments: Instructions to care for your wound at home: Keep wound clean and dry.  Keep splint applied.  Do not get wet     Follow-up and recommended labs and tests   Order Comments: Follow-up with Dr Ray at [facility] early next week for recheck (Dec 20-22)  To arrange appointment or questions call: the care coordinator at [phone]  [facility] Colleen phone number: [phone]  Main [facility] Jenny phone number: [phone]    Follow up with nursing home physician, no labs needed     Reason for your hospital stay   Order Comments: You were hospitalized after a fall and were found to have a open left 4th phalanx fracture.  You will be on antibiotics for this and have follow-up with orthopedics in clinic soon.  You also likely had a COPD exacerbation that has improved with prednisone and you will continue this on discharge for the next week or so.  As we discussed your carbon dioxide levels are high, but you did not tolerate the BiPAP device so this will not be continued at home.  You have been having intermittent abnormal heart rhythms resulting in a fast heart rate so I have started oral diltiazem for this.     General info for SNF   Order Comments: Length of Stay Estimate: Short Term Care: Estimated # of Days <30  Condition at Discharge: Stable  Level of care:skilled   Rehabilitation Potential: Fair  Admission H&P remains valid and up-to-date: Yes  Recent Chemotherapy: N/A  Use Nursing Home Standing Orders: Yes     Mantoux instructions   Order Comments: Give two-step Mantoux (PPD) Per Facility Policy Yes     Activity - Up with assistive device     Order Specific Question Answer Comments   Is discharge order? Yes      Full Code     Order Specific Question Answer Comments   Code status determined by:  Discussion with patient/ legal decision maker      Physical Therapy Adult Consult   Order Comments: Evaluate and treat as clinically indicated.    Reason:  Physical deconditioning with fall and left fourth phalanx fracture, advanced COPD     Occupational Therapy Adult Consult   Order Comments: Evaluate and treat as clinically indicated.    Reason: Physical deconditioning with fall and left fourth phalanx fracture, advanced COPD     Oxygen Adult/Peds   Order Comments: Oxygen Documentation:   I certify that this patient, Carmen Perez has been under my care (or a nurse practitioner or physican's assistant working with me). This is the face-to-face encounter for oxygen medical necessity.      Carmen Perez is now in a chronic stable state and continues to require supplemental oxygen. Patient has continued oxygen desaturation due to COPD J44.9.    Alternative treatment(s) tried or considered and deemed clinically infective for treatment of COPD J44.9 include nebulizers, inhalers, steroids, and pulmonary toileting.  If portability is ordered, is the patient mobile within the home? yes    **Patients who qualify for home O2 coverage under the CMS guidelines require ABG tests or O2 sat readings obtained closest to, but no earlier than 2 days prior to the discharge, as evidence of the need for home oxygen therapy. Testing must be performed while patient is in the chronic stable state. See notes for O2 sats.**     Order Specific Question Answer Comments   DME Provider: [facility]    Type: Resume    Did the patient have SpO2 (sat) testing (only needed for new oxgyen or liter flow changes)? No    Length of Need: Lifetime    Frequency of Use: Continuous    Mode of Delivery - Continuous Nasal Cannula    Liter Flow - Continuous (LPM): 3.5    Need for Portability: Yes    Evaluate for Conserving Device: Yes    Maintain Sats >= 90%    The face to face evaluation was performed on: 12/19/2021      Diet   Order Comments: Follow this  diet upon discharge: Orders Placed This Encounter      Regular Diet Adult     Order Specific Question Answer Comments   Is discharge order? Yes        I, John Harley MD, personally saw the patient today and spent greater than 30 minutes discharging this patient.    John Harley MD

## 2021-12-19 NOTE — PLAN OF CARE
Ax1 with walker and gait belt. A&Ox4.  VSS. 02 - 99% on 4L nasal canula. LS - diminished. CMS intact. Tele report - Sinus rhythm - 60's.  Colostomy care given. Passing flatus, no BM this shift.   IV - SL. Splint - CD&I.   Pain controlled with scheduled Tylenol.

## 2021-12-19 NOTE — PLAN OF CARE
Plan is for patient to discharge today to TCU. VSS Up In chair for meals. O2 3.5liter per N/C witch is patients baseline. Colostomy with air and soft formed stool. Lungs clear but decreased bilat. Tele ST wit -105. Dilt po changed to ER.

## 2021-12-20 NOTE — PLAN OF CARE
"Occupational Therapy Discharge Summary    Reason for therapy discharge:    Discharged to transitional care facility.    Progress towards therapy goal(s). See goals on Care Plan in [facility] electronic health record for goal details.  Goals partially met.  Barriers to achieving goals:   discharge from facility.    Therapy recommendation(s):  per treating therapist    \"pt below baseline function in I/ADL and would benefit from continued OT at [facility] to maximize safety and independence prior to return to home\"  "

## 2021-12-20 NOTE — PROGRESS NOTES
Clinic Care Coordination Contact    Background: Care Coordination referral placed from \[facility]\"" discharge report for reason of patient meeting criteria for a TCM outreach call by [facility] [place] team.    Assessment: Upon chart review, [facility] Team member will cancel/close the referral for TCM outreach due to reason below:    Patient has discharged to a Group home, Memory Care or Nursing Home : Skilled Nursing Facility      Plan: Care Coordination referral for TCM outreach canceled.      SEBASTIEN Carreon  [phone]  [facility]

## 2021-12-20 NOTE — PLAN OF CARE
"Physical Therapy Discharge Summary    Reason for therapy discharge:    Discharged to transitional care facility.    Progress towards therapy goal(s). See goals on Care Plan in [facility] electronic health record for goal details.  Goals partially met.  Barriers to achieving goals:   discharge from facility.    Therapy recommendation(s):  per treating therapist    \"pt below baseline function in mobility and would benefit from continued PT at TCU to maximize safety and independence prior to return to home\"    "

## 2021-12-21 NOTE — PROGRESS NOTES
[facility] GERIATRIC SERVICES    Chief Complaint   Patient presents with     RECHECK     HPI:  Carmen Perez is a 71 year old  (1950), who is being seen today for an episodic care visit at: [facility] ([facility]) [[id number]].     This is a 70 yo female who has chronic hypoxic respiratory failure on 3.5 L of oxygen who is unvaccinated for COVID-19 and has been isolating in her home for approximately 2 years who presented after a fall after tripping on her nasal cannula cord.  She was found to have significant hypercapnia requiring BiPAP and sustained a left fourth proximal phalanx commuted fracture.  Ortho was consulted and recommended oral Keflex and follow-up in the clinic.  Hypercapnia improved, started on Solu-Medrol for COPD exacerbation then changed to prednisone taper at discharge.  Also had intermittent A. fib versus SVT, started on oral diltiazem which improved rate and elected not to start AC. Chest x-ray does not show any infiltrate.  She is afebrile no leukocytosis.  COVID-19 PCR is negative.  Not interested in NIPPV at home.  Advised to get vaccinated.  Also apparently stopped her Lexapro a week ago as it seemed not to be helpful, will follow up outpatient.  No other changes noted so was discharged to [facility] for rehab.    History obtained from discussion with nursing and PT as well as extensive review of the chart necessitated by complex hospitalization    Allergies, and PMH/PSH reviewed in [facility] today.  REVIEW OF SYSTEMS:  10 point ROS of systems including Constitutional, Eyes, Respiratory, Cardiovascular, Gastroenterology, Genitourinary, Integumentary, Musculoskeletal, Psychiatric were all negative except for pertinent positives noted in my HPI.    Objective:   /67   Pulse 96   Temp 96.8  F (36  C)   Resp 24   Wt 55.3 kg (121 lb 14.4 oz)   SpO2 97%   BMI 21.59 kg/m    GENERAL APPEARANCE:  Alert, oriented, morbidly obese, cooperative, generalized pain  ENT:  Mouth and posterior oropharynx  normal, moist mucous membranes, normal hearing acuity  NECK:  No adenopathy,masses or thyromegaly  RESP:  respiratory effort and palpation of chest normal, no respiratory distress, diminished breath sounds bilaterally, GEE, 3.5L NC  CV:  Palpation and auscultation of heart done , no edema, irregular rhythm rate controlled A-fib  ABDOMEN:  normal bowel sounds, soft, nontender, no hepatosplenomegaly or other masses, ostomy present  M/S:   Able to move all extremities  SKIN:  Inspection of skin and subcutaneous tissue baseline  NEURO:   No focal deficits  PSYCH:  oriented to 2/3      Most Recent 3 CBC's:  Recent Labs   Lab Test 12/19/21  0612 12/16/21  0937 01/11/20  1155 04/21/19  1151   WBC  --  10.9 7.4 9.7   HGB  --  10.8* 11.1* 10.8*   MCV  --  101* 89 89    371 304 295     Most Recent 3 BMP's:  Recent Labs   Lab Test 12/19/21  0612 12/18/21  0659 12/17/21  0639 12/16/21  0937   NA  --  139 139 144   POTASSIUM  --  4.8 4.4 4.4   CHLORIDE  --  98 98 99   CO2  --  41* 36* 43*   BUN  --  27 27 13   CR 0.59 0.53 0.56 0.52   ANIONGAP  --  <1* 5 2*   OLI  --  9.1 9.0 9.3   GLC  --  118* 97 111*     Most Recent 3 BNP's:No lab results found.  Most Recent TSH and T4:  Recent Labs   Lab Test 10/02/17  1234   TSH 1.11     Most Recent Hemoglobin A1c:No lab results found.    Assessment/Plan:    (J96.11,  Z99.81) Chronic respiratory failure with hypoxia, on home oxygen therapy (H)  (primary encounter diagnosis)  (J44.1) COPD exacerbation (H)  Underwent exacerbation, required NIPPV, declined placement at home.  Chronically on 3.5 L nasal cannula.  Ongoing steroid taper. Continue Combivent 20/100 MCG 1 puff 4 times daily as needed, Advair 250/50 MCG 1 puff twice daily, and she was Ellipta 6.25 MCG 1 puff daily    Unvaccinated per Covid  Schedule vaccination for next week    (R01.755O) Open displaced fracture of proximal phalanx of left ring finger  Given keflex, complete on 12/26. F/u with ortho    No A. fib versus  SVT  Start on diltiazem 120 mg daily.  Monitor blood pressure twice daily    Pain  Increase Tylenol to 1000 mg 3 times daily    (K57.20) Diverticulitis of large intestine with perforation, unspecified bleeding status - 5/2016 requiring emergency colostomy placement  Manages ostomy per self.  Start senna S1 tab daily    (I27.20) Pulmonary HTN - probably secondary to COPD per cardiology  Hx of mild to moderate pulmonary hypertension.  TTE EF 55-60%, mild to moderate pulmonary HTN, dilated IVC, one-2+ MR, and 1+ TR    Macrocytic hyperchromic anemia  Last hemoglobin 10.8 with  on 12/16, recheck CBC on 12/27    (F33.42) Recurrent major depression in complete remission (H)  Recently stopped Escitalopram, monitor mood    Orders:  Increase Tylenol per pain control, increase BP monitoring per new rate control use, start senna S for constipation, schedule vaccination per next week, CBC recheck per anemia    Electronically signed by: Bronson Colby NP

## 2021-12-22 NOTE — PROGRESS NOTES
[facility] GERIATRIC SERVICES  PRIMARY CARE PROVIDER AND CLINIC:  Vicky Ye PA-C, [address] [facility] [address]  Chief Complaint   Patient presents with     Hospital F/U     [facility] Medical Record Number:  [id number]  Place of Service where encounter took place:  [facility] [[id number]]    Carmen Perez  is a 71 year old  (1950), admitted to the above facility from  [facility]. Hospital stay 12/16/21 through 12/19/21..  Admitted to this facility for  rehab, medical management and nursing care.    HPI:    HPI information obtained from: facility chart records, facility staff, patient report and [facility] chart review.   Brief Summary of Hospital Course:   -Patient with PMH pertinent for COPD and chronic hypoxic and hypercapnic respiratory failure, had a fall at home after tripping on her nasal cannula cord, was found to have acute on chronic respiratory failure and AE-COPD requiring BiPAP, and steroid therapy. Declined NIPPV at home.   * Also, was found to have  fourth phalanx comminuted fracture managed conservatively and with keflex.   * hospital stay was c/w A-fib vs SVT, started on diltiazem with improvement. declined OAC.   * evaluated by PMR and felt to benefit from TCU placement    Today:  - Resp failure: OT reports pt's O2 dropped to mix 80's with standing up and with moving to commode, on 3.5 Liter of O2. Pt reports would like to keep her O2 volume as is, neither increase or decrease. Likes to keep her room cold to help with breathing.  endorse a progressive severe GEE.    - A-fib: reports that the heart medicine she was placed on while at the hospital is a wonderful and she is feeling much better and has given her a new lease of live. Reports heart palpitation is much better. Feels like a brand new.   - left finger fx: reports cannot feel it,  Reports soreness over the wrist    =====================================================    CODE STATUS/ADVANCE  DIRECTIVES DISCUSSION:   CPR/Full code   Patient's living condition: lives alone  ALLERGIES: Patient has no known allergies.  PAST MEDICAL HISTORY:  has a past medical history of Asthma, COPD (chronic obstructive pulmonary disease) (H), Cor pulmonale (H), Hyperkalemia (2012), Mild major depression (H), and Myocardial infarction (H).    She has no past medical history of Diabetes (H), History of blood transfusion, PONV (postoperative nausea and vomiting), or Thyroid disease.  PAST SURGICAL HISTORY:   has a past surgical history that includes Laparoscopic tubal ligation;  section; Laparoscopic assisted colectomy (N/A, 2016); Laparoscopic assisted colostomy (N/A, 2016); and Herniorrhaphy incisional (location) (N/A, 2016).  FAMILY HISTORY: family history includes C.A.D. in her mother.  SOCIAL HISTORY:   reports that she quit smoking about 9 years ago. Her smoking use included cigarettes. She smoked 0.25 packs per day. She has never used smokeless tobacco. She reports that she does not drink alcohol and does not use drugs.    Post Discharge Medication Reconciliation Status: discharge medications reconciled and changed, per note/orders  Current Outpatient Medications   Medication Sig Dispense Refill     acetaminophen (TYLENOL) 500 MG tablet Take 1,000 mg by mouth 3 times daily        calcium carb-cholecalciferol 600-500 MG-UNIT CAPS Take 1 tablet by mouth daily       cephALEXin (KEFLEX) 500 MG capsule Take 1 capsule (500 mg) by mouth 3 times daily for 7 days (Patient taking differently: Take 500 mg by mouth 3 times daily Complete on )       COMBIVENT RESPIMAT  MCG/ACT inhaler INHALE 1 PUFF INTO THE LUNGS FOUR TIMES DAILY AS NEEDED FOR SHORTNESS OF BREATH OR DIFFICULT BREATHING OR WHEEZING 4 g 0     diltiazem ER COATED BEADS (CARDIZEM CD/CARTIA XT) 120 MG 24 hr capsule Take 1 capsule (120 mg) by mouth daily       fluticasone-salmeterol (ADVAIR DISKUS) 250-50 MCG/DOSE inhaler Inhale 1  "puff into the lungs 2 times daily 1 Inhaler 1     INCRUSE ELLIPTA 62.5 MCG/INH Inhaler INHALE 1 PUFF INTO THE LUNGS DAILY 1 Inhaler 1     predniSONE (DELTASONE) 20 MG tablet Take 3 tabs by mouth daily x 3 days, then 2 tabs daily x 3 days, then 1 tab daily x 3 days, then 1/2 tab daily x 3 days. 20 tablet 0     senna-docusate (SENOKOT-S/PERICOLACE) 8.6-50 MG tablet Take 1 tablet by mouth daily       ROS: 10 point ROS of systems including Constitutional, Eyes, Respiratory, Cardiovascular, Gastroenterology, Genitourinary, Integumentary, Musculoskeletal, Psychiatric were all negative except for pertinent positives noted in my HPI.    Vitals:  /69   Pulse 84   Temp (!) 96.4  F (35.8  C)   Resp 22   Ht 1.6 m (5' 3\")   Wt 55.3 kg (122 lb)   SpO2 96%   BMI 21.61 kg/m    Exam:  GENERAL APPEARANCE:  in no distress, cooperative  ENT: NC in place. Dry oral mucosa. .   EYES:  EOMI, Pupil rounded and equal.  RESP:  Diffusely coarse sounds. Uses extra respiratory muscles.   CV:  S1S2 audible, regular HR, no murmur appreciated.   ABDOMEN:  soft, NT/ND, BS audible. no mass appreciated on palpation.   M/S:   Left forearm and hand in cast.distal NVB intact.   SKIN:  Ecchymosis over left 2nd finger.   NEURO:   No NFD appreciated on observation. Hand  5/5 b/l  PSYCH:  normal insight, judgement and memory, affect and mood normal      Lab/Diagnostic data: Reviewed in the chart and EHR.          ASSESSMENT/PLAN:  --------------------------------  -Patient with PMH pertinent for Pulmonary HTN, COPD and chronic hypoxic and hypercapnic respiratory failure, had a fall at home after tripping on her nasal cannula cord, was found to have acute on chronic respiratory failure and AE-COPD requiring BiPAP, started on steroid therapy. Declined NIPPV at home.   * hospital stay was c/w A-fib vs SVT, started on diltiazem with improvement. declined OAC.   * evaluated by PMR and felt to benefit from TCU placement  - compensated. On 3.5 L of " O2 (at home on 3.5 L). Does not want to adjust O2 level for she has been on this for several years. Patient is aware of her limitation.   - finishing taper prednisone. On KARINA/NOEL prn, and LAMA/LABA/CSI.   - CVR, continue diltiazem. Cardiology follow up on outpatient basis.   - spoke about vaccination for covid19 and influenza, in agreement, but wants to take pfizer only.   - started rehab program, making a progress, continue until desired goal is achieved.       * Open displaced fx of proximal phalanx of left ring finger managed conservatively and with keflex.   -  analgesia optimal. Followed by Orthopedic team.       Hx of recurrent major depression in complete remission (H): recently took herself off lexapro 10 mg for lack of benefit.  Patient kept self quarantine at home for almost two years. Adjusting well. No concern     [facility]: [facility] 23/30. PCP to follow.         ORDER:   - ok to give pfizer and flu vaccination.        Electronically signed by:  Sana Peña MD

## 2021-12-23 NOTE — PROGRESS NOTES
[facility] GERIATRIC SERVICES    Chief Complaint   Patient presents with     RECHECK     HPI:  Carmen Perez is a 71 year old  (1950), who is being seen today for an episodic care visit at: [facility] ([facility]) [[id number]].       This is a 70 yo female who has chronic hypoxic respiratory failure on 3.5 L of oxygen who is unvaccinated for COVID-19 and has been isolating in her home for approximately 2 years who presented after a fall after tripping on her nasal cannula cord.  She was found to have significant hypercapnia requiring BiPAP and sustained a left fourth proximal phalanx commuted fracture.  Ortho was consulted and recommended oral Keflex and follow-up in the clinic.  Hypercapnia improved, started on Solu-Medrol for COPD exacerbation then changed to prednisone taper at discharge.  Also had intermittent A. fib versus SVT, started on oral diltiazem which improved rate and elected not to start AC. Chest x-ray does not show any infiltrate.  She is afebrile no leukocytosis.  COVID-19 PCR is negative.  Not interested in NIPPV at home.  Advised to get vaccinated.  Also apparently stopped her Lexapro a week ago as it seemed not to be helpful, will follow up outpatient.  No other changes noted so was discharged to [facility] for rehab.    Today's concern is:  Anxiety, SOB (chronic)    Allergies, and PMH/PSH reviewed in [facility] today.  REVIEW OF SYSTEMS:  4 point ROS including Respiratory, CV, GI and , other than that noted in the HPI,  is negative    Objective:   /61   Pulse 75   Temp 96.8  F (36  C)   Resp 16   Wt 55.3 kg (122 lb)   SpO2 96%   BMI 21.61 kg/m    GENERAL APPEARANCE:  Alert, oriented, morbidly obese, cooperative, generalized pain  RESP:  respiratory effort and palpation of chest normal, no respiratory distress, diminished breath sounds bilaterally, GEE, 3.5L NC  CV:  Palpation and auscultation of heart done , no edema, irregular rhythm rate controlled A-fib  PSYCH:  oriented to 2/3. SLUMS  23/30      Most Recent 3 CBC's:  Recent Labs   Lab Test 12/19/21  0612 12/16/21  0937 01/11/20  1155 04/21/19  1151   WBC  --  10.9 7.4 9.7   HGB  --  10.8* 11.1* 10.8*   MCV  --  101* 89 89    371 304 295     Most Recent 3 BMP's:  Recent Labs   Lab Test 12/19/21  0612 12/18/21  0659 12/17/21  0639 12/16/21  0937   NA  --  139 139 144   POTASSIUM  --  4.8 4.4 4.4   CHLORIDE  --  98 98 99   CO2  --  41* 36* 43*   BUN  --  27 27 13   CR 0.59 0.53 0.56 0.52   ANIONGAP  --  <1* 5 2*   OLI  --  9.1 9.0 9.3   GLC  --  118* 97 111*       Assessment/Plan:    (J96.11,  Z99.81) Chronic respiratory failure with hypoxia, on home oxygen therapy (H)  (primary encounter diagnosis)  (J44.1) COPD exacerbation (H)  Underwent exacerbation, required NIPPV, declined placement at home.  Chronically on 3.5 L nasal cannula.  Ongoing steroid taper (complete on 1/1/22). Today change Combivent 20/100 MCG 1 puff 4 times daily, Advair 250/50 MCG 1 puff twice daily, and she was Ellipta 6.25 MCG 1 puff daily     Unvaccinated per Covid  Schedule vaccination for next week, would like Pfizer     (S68.817Y) Open displaced fracture of proximal phalanx of left ring finger  Given keflex, complete on 12/26. F/u with ortho per Grace CNP on 12/30     No A. fib versus SVT  (I27.20) Pulmonary HTN - probably secondary to COPD per cardiology  Hx of mild to moderate pulmonary hypertension.  TTE EF 55-60%, mild to moderate pulmonary HTN, dilated IVC, one-2+ MR, and 1+ TR. Continue diltiazem 120 mg daily. SBP <120, HR <80s     Pain  Tylenol 1000 mg 3 times daily     (K57.20) Diverticulitis of large intestine with perforation, unspecified bleeding status - 5/2016 requiring emergency colostomy placement  Manages ostomy per self. senna S1 tab daily      Macrocytic hyperchromic anemia  Last hemoglobin 10.8 with  on 12/16, recheck CBC on 12/27     (F33.42) Recurrent major depression in complete remission (H)  Recently stopped Escitalopram, monitor  mood    Therapy  Ambulating 10ft with FWW    Orders:  Change combivent to QID    Electronically signed by: Bronson Colby NP

## 2021-12-28 PROBLEM — S62.609A FRACTURE OF PHALANX OF FINGER: Status: ACTIVE | Noted: 2021-01-01

## 2021-12-28 PROBLEM — J18.9 PNEUMONIA DUE TO INFECTIOUS ORGANISM: Status: ACTIVE | Noted: 2021-01-01

## 2021-12-28 PROBLEM — R09.02 HYPOXIA: Status: ACTIVE | Noted: 2021-01-01

## 2021-12-28 PROBLEM — R41.0 DISORIENTATION: Status: ACTIVE | Noted: 2021-01-01

## 2021-12-28 PROBLEM — J15.9 HOSPITAL-ACQUIRED BACTERIAL PNEUMONIA: Status: ACTIVE | Noted: 2021-01-01

## 2021-12-28 PROBLEM — M62.81 GENERALIZED MUSCLE WEAKNESS: Status: ACTIVE | Noted: 2021-01-01

## 2021-12-28 PROBLEM — D63.8 ANEMIA IN OTHER CHRONIC DISEASES CLASSIFIED ELSEWHERE: Status: ACTIVE | Noted: 2021-01-01

## 2021-12-28 PROBLEM — J44.1 CHRONIC OBSTRUCTIVE PULMONARY DISEASE WITH ACUTE EXACERBATION (H): Status: ACTIVE | Noted: 2021-01-01

## 2021-12-28 NOTE — ED TRIAGE NOTES
Pt in rehab at [facility], pt agitated because she states staff not attentive to her needs.  Pt called [phone]

## 2021-12-28 NOTE — ED NOTES
Pt was put back on BIPAP after the duoneb treatment. Pt is more awake, alert and orientated, expressing her needs, pleasant to interact with

## 2021-12-28 NOTE — ED NOTES
Bed: ED20  Expected date:   Expected time:   Means of arrival:   Comments:  Jenny - 78 F weakness eta 2174

## 2021-12-28 NOTE — ED PROVIDER NOTES
History     Chief Complaint:  Agitation       HPI   Carmen Perez is a 71 year old female who presents with mild agitation regarding her current TCU placement situation.    Patient was hospitalized from December 16 through December 19 at [facility] for multiple conditions including acute on chronic hypoxic and hypercapnic respiratory failure, end-stage/advanced COPD with acute exacerbation, she had sustained a fall with an open fourth proximal left phalanx fracture, she had comorbidities of moderate pulmonary hypertension and paroxysmal SVT.    The patient was maintained on 3.5 L of nasal cannula oxygen.  Her situation stabilized on antibiotics and steroids.  She underwent operative management of her phalanx fracture.  She was placed in a left ulnar gutter splint.    The patient notes that she was placed in a TCU at [facility].  She notes that she had a bath several days ago and they have not provided her with any lower extremity garments such as underpants or clothing.  She has been allowed to wear a shirt.  She notes that when the staff check on her they give her about 10 seconds worth of time to make a decision.  She notes that it takes her longer than that generally.  She is very unhappy with the care there.  Patient notes that she was living at home successfully for several years prior to being placed at [facility].  She does not wish to return there.    She has no complaints at this time.  She notes that she has a chronic cough.  Her oxygen levels have been stable on 3.5 L.  She has no pain at this time.    Of note, the patient has had 4, negative Covid tests since December 19.      ROS:  Review of Systems  No headache.  Chronic cough.  No acute dyspnea.  Chronic dyspnea is ever present.  No abdominal pain.  No lower extremity complaints.  There is a splint on her left hand which is not causing her any difficulties at this time.  All other systems are negative    Allergies:  No Known Allergies      Medications:    acetaminophen (TYLENOL) 500 MG tablet  calcium carb-cholecalciferol 600-500 MG-UNIT CAPS  COMBIVENT RESPIMAT  MCG/ACT inhaler  diltiazem ER COATED BEADS (CARDIZEM CD/CARTIA XT) 120 MG 24 hr capsule  fluticasone-salmeterol (ADVAIR DISKUS) 250-50 MCG/DOSE inhaler  INCRUSE ELLIPTA 62.5 MCG/INH Inhaler  predniSONE (DELTASONE) 20 MG tablet  senna-docusate (SENOKOT-S/PERICOLACE) 8.6-50 MG tablet        Past Medical History:    Past Medical History:   Diagnosis Date     Asthma      COPD (chronic obstructive pulmonary disease) (H)      Cor pulmonale (H)      Hyperkalemia 2012     Mild major depression (H)      Myocardial infarction (H)      Patient Active Problem List   Diagnosis     COPD, severe (H)     CARDIOVASCULAR SCREENING; LDL GOAL LESS THAN 160     Cor pulmonale (H)     (HFpEF) heart failure with preserved ejection fraction (H)     S/P colostomy (H) - since colonic perforation in 2016 due to sigmoid diverticulitis     Recurrent major depression in complete remission (H)     Diverticulitis of large intestine with perforation, unspecified bleeding status - 2016 requiring emergency colostomy placement.     History of cervical fracture - C2,C7 while in hospital following emergency surgery for perforated diverticula. Cleared by neurosurgery 2016. No chronic issues since.     Former smoker - 40 pack yr history     Panlobular emphysema (H)     Pulmonary HTN - probably secondary to COPD per cardiology     Dyspnea, unspecified type     Chronic venous insufficiency     Chronic respiratory failure with hypoxia, on home oxygen therapy (H)     COPD exacerbation (H)     Acute and chronic respiratory failure with hypercapnia (H)     Open displaced fracture of proximal phalanx of left ring finger, initial encounter     Fall, initial encounter     Disorientation        Past Surgical History:    Past Surgical History:   Procedure Laterality Date      SECTION       HERNIORRHAPHY INCISIONAL  (LOCATION) N/A 5/31/2016    Procedure: HERNIORRHAPHY INCISIONAL (LOCATION);  Surgeon: Bronson Ornelas MD;  Location: [facility] OR     LAPAROSCOPIC ASSISTED COLECTOMY N/A 5/18/2016    Procedure: LAPAROSCOPIC ASSISTED COLECTOMY;  Surgeon: Bronson Ornelas MD;  Location: [facility] OR     LAPAROSCOPIC ASSISTED COLOSTOMY N/A 5/18/2016    Procedure: LAPAROSCOPIC ASSISTED COLOSTOMY;  Surgeon: Bronson Ornelas MD;  Location: [facility] OR     LAPAROSCOPIC TUBAL LIGATION          Family History:    family history includes C.A.D. in her mother.    Social History:   reports that she quit smoking about 9 years ago. Her smoking use included cigarettes. She smoked 0.25 packs per day. She has never used smokeless tobacco. She reports that she does not drink alcohol and does not use drugs.  PCP: Vicky Ye     Physical Exam     Patient Vitals for the past 24 hrs:   BP Temp Temp src Pulse Resp SpO2   12/28/21 1730 -- -- -- -- -- 95 %   12/28/21 1720 -- -- -- -- -- 100 %   12/28/21 1645 -- -- -- -- -- 99 %   12/28/21 1640 (!) 119/90 -- -- 111 -- 100 %   12/28/21 1600 -- -- -- -- -- 100 %   12/28/21 1500 -- -- -- -- -- 99 %   12/28/21 1345 -- -- -- -- -- 99 %   12/28/21 1330 110/65 -- -- 96 -- 99 %   12/28/21 1230 -- -- -- -- -- 99 %   12/28/21 1200 -- -- -- -- -- 100 %   12/28/21 1135 131/72 -- -- 108 -- 97 %   12/28/21 1000 -- -- -- -- -- 99 %   12/28/21 0930 -- -- -- -- -- 100 %   12/28/21 0900 -- -- -- -- -- 100 %   12/28/21 0852 125/83 98.6  F (37  C) Oral 98 20 100 %        Physical Exam  General: Resting comfortably on the gurney    She recounts multiple stories of what she perceives is poor care that she has been receiving at [place].    She is pleasant, and articulate.  She does not remember the exact day of the week at this time.  Head:  The scalp, face, and head appear normal  Eyes:  The pupils are equal, round, and reactive to light    There is no nystagmus    Extraocular muscles are intact    Conjunctivae and  sclerae are normal  ENT:    The nose is normal    Pinnae are normal    The oropharynx is normal    Uvula is in the midline  Neck:  Normal range of motion    There is no rigidity noted    There is no midline cervical spine pain/tenderness    Trachea is in the midline    No mass is detected  CV:  Slight tachycardic rate    Normal S1/S2, no S3/S4    No pathological murmur detected  Resp:  Lungs reveal decreased aeration throughout consistent with COPD.  There are very small tidal volumes.  She does have some end expiratory wheezing.  She has some inspiratory squeaks.  She has some fibrotic sounding crackles, left greater than right  GI:  Abdomen is soft, there is no rigidity    There is a healthy colostomy in the left lower quadrant.    There is a well-healed low abdominal surgical scar    There is a hernia noted to the right midabdomen    No distension    No tympani    No rebound tenderness     Non-surgical without peritoneal features  MS:  Normal muscular tone    Symmetric motor strength    No major joint effusions    No asymmetric leg swelling, no calf tenderness    The left arm has a short arm ulnar gutter splint    The index finger is bruised I cannot see fingers 3 4 and 5 as they are splinted all the tips of the fingers are normal.    Lower extremities are normal.  Right arm is bruised but nontender.  Skin:  No rash or acute skin lesions noted  Neuro: Speech is normal and fluent  Psych:  Awake. Alert.      Normal affect.  Appropriate interactions.  Lymph: No anterior cervical lymphadenopathy noted              Emergency Department Course     Imaging:  XR Chest Port 1 View   Final Result   IMPRESSION: Increasing patchy opacities at the left upper lateral lung   may be progression of pneumonia. Emphysematous changes noted   bilaterally along with some interstitial prominence. Normal cardiac   silhouette.       BRI NARVAEZ MD            SYSTEM ID:  [id number]         Report per radiology    Laboratory:  Labs Ordered  and Resulted from Time of ED Arrival to Time of ED Departure   COMPREHENSIVE METABOLIC PANEL - Abnormal       Result Value    Sodium 142      Potassium 4.3      Chloride 98      Carbon Dioxide (CO2) >45 (*)     Anion Gap <1 (*)     Urea Nitrogen 17      Creatinine 0.55      Calcium 8.6      Glucose 98      Alkaline Phosphatase 19 (*)     AST 13      ALT 27      Protein Total 6.5 (*)     Albumin 3.1 (*)     Bilirubin Total 0.2      GFR Estimate >90     CBC WITH PLATELETS AND DIFFERENTIAL - Abnormal    WBC Count 16.2 (*)     RBC Count 3.72 (*)     Hemoglobin 10.2 (*)     Hematocrit 39.0       (*)     MCH 27.4      MCHC 26.2 (*)     RDW 13.2      Platelet Count 286      % Neutrophils 87      % Lymphocytes 3      % Monocytes 8      % Eosinophils 1      % Basophils 0      % Immature Granulocytes 1      NRBCs per 100 WBC 0      Absolute Neutrophils 14.2 (*)     Absolute Lymphocytes 0.4 (*)     Absolute Monocytes 1.3      Absolute Eosinophils 0.2      Absolute Basophils 0.0      Absolute Immature Granulocytes 0.1      Absolute NRBCs 0.0     BLOOD GAS VENOUS WITH OXYHEMOGLOBIN - Abnormal    pH Venous 7.21 (*)     pCO2 Venous 122 (*)     pO2 Venous 23 (*)     Bicarbonate Venous 49 (*)     FIO2 4      Oxyhemoglobin Venous 32 (*)     Base Excess/Deficit (+/-) 17.0 (*)    PROCALCITONIN - Abnormal    Procalcitonin 0.15 (*)    BLOOD GAS VENOUS WITH OXYHEMOGLOBIN - Abnormal    pH Venous 7.24 (*)     pCO2 Venous 111 (*)     pO2 Venous 31      Bicarbonate Venous 48 (*)     FIO2 50      Oxyhemoglobin Venous 52 (*)     Base Excess/Deficit (+/-) 16.7 (*)    BLOOD GAS VENOUS WITH OXYHEMOGLOBIN - Abnormal    pH Venous 7.21 (*)     pCO2 Venous 123 (*)     pO2 Venous 15 (*)     Bicarbonate Venous 49 (*)     FIO2 99      Oxyhemoglobin Venous 15 (*)     Base Excess/Deficit (+/-) 16.7 (*)    COVID-19 VIRUS (CORONAVIRUS) BY PCR - Normal    SARS CoV2 PCR Negative     BLOOD GAS ARTERIAL WITH OXYHEMOGLOBIN        Procedures       Emergency  Department Course:  The patient had a complete history and physical obtained  She had multiple reassessments well in the emergency department     I spoke with Trinity from social work services on 3 occasions regarding this patient's subsequent needs.      Reviewed:  I reviewed nursing notes, vitals and past medical history    Assessments:   I obtained history and examined the patient as noted above.   10:58 AM   I rechecked the patient.      Consults:       Interventions:  Medications   piperacillin-tazobactam (ZOSYN) 4.5 g vial to attach to  mL bag (has no administration in time range)   No lozenges or gum should be given while patient on BIPAP/AVAPS/AVAPS AE (has no administration in time range)   carboxymethylcellulose PF (REFRESH PLUS) 0.5 % ophthalmic solution 1 drop (has no administration in time range)   Patient may continue current oral medications (has no administration in time range)   methylPREDNISolone sodium succinate (solu-MEDROL) injection 62.5 mg (has no administration in time range)   albuterol (PROVENTIL) neb solution 2.5 mg (has no administration in time range)   0.9% sodium chloride BOLUS (has no administration in time range)   0.9% sodium chloride BOLUS (0 mLs Intravenous Stopped 12/28/21 1324)   methylPREDNISolone sodium succinate (solu-MEDROL) injection 62.5 mg (62.5 mg Intravenous Given 12/28/21 1059)   piperacillin-tazobactam (ZOSYN) 4.5 g vial to attach to  mL bag (0 g Intravenous Stopped 12/28/21 1324)   magnesium sulfate 2 g in water intermittent infusion (0 g Intravenous Stopped 12/28/21 1557)   ipratropium - albuterol 0.5 mg/2.5 mg/3 mL (DUONEB) neb solution 3 mL (3 mLs Nebulization Given 12/28/21 1621)   ipratropium - albuterol 0.5 mg/2.5 mg/3 mL (DUONEB) neb solution 3 mL (3 mLs Nebulization Given 12/28/21 1647)        Disposition:  The patient was admitted to the hospital under the care of Dr. Patience Knapp.     Impression & Plan      Covid-19  Carmen Perez  was evaluated during a global COVID-19 pandemic, which necessitated consideration that the patient might be at risk for infection with the SARS-CoV-2 virus that causes COVID-19.   Applicable protocols for evaluation were followed during the patient's care.   COVID-19 was considered as part of the patient's evaluation. The plan for testing is:  a test was obtained during this visit.  The test is pending at this time    Medical Decision Making:  This patient presents to the emergency department upset about her current TCU/rehab stay at [facility] which is across the street.  She feels that her cares are not being met.  She has an ongoing cough and has a history of end-stage oxygen dependent COPD.  The patient has a moist sounding cough here which she notes is chronic.  Her oxygen saturations are 100% on 3.5 L of her chronic nasal cannula oxygen.  She notes at baseline that she does not have much energy.  She is currently also impaired given a recent open fracture involving her left hand which is casted in an ulnar gutter splint.    On evaluation the patient is noted to have general weakness and deconditioning.  She is not hypoxic at this juncture.  She does have increased work of breathing which is chronic for her.  She is noted to have a new left upper lobe patchy pneumonia.  Covid is retested but is been negative multiple times recently at [facility].  This likely represents bacterial pneumonia.  Given that she was recently in the hospital she is treated with intravenous Zosyn.    The patient will require replacement into another TCU or rehab facility after this hopefully brief admission.  I contacted  to let them know that this will need to be the plan.    12:32 PM  patient's laboratories returned showing acute and chronic respiratory acidosis.  She does have CO2 retention on similar to her recent admission, in fact, there appears to be more metabolic compensation.  A treatment of BiPAP is ordered at this  time.  Patient generally does not prefer BiPAP but it will certainly help clear some of the retained CO2.  Her white blood cell count returned showing leukocytosis consistent with her pneumonia.  Antibiotics have been administered.  The patient has excellent chronic metabolic renal compensation making the impact of the hypercapnia less intense in terms of acidemia than it would be otherwise.    5:45 PM  The patient's venous blood gas showed slight improvement at first from a PCO2 of 122 down to 111, this was after intermittent use.  We then set the patient up and got her to cooperate more with BiPAP.  The patient was watching TV.  She was talkative with the nurse and respiratory therapist and myself.  The patient noted that she did not wish to be on a mechanical ventilator unless it was absolutely necessary.  She noted that she much prefers the facemask.  She has been cooperative for the last couple hours with BiPAP.  We did give the patient a couple DuoNebs.  We checked her VBG again and it did show a mild return to her original parameters at a pH of 7.21 and a PCO2 of 123.  Despite this troublesome appearing venous blood gas, the patient's oxygen saturations are 98% on room air, she is talkative, watching TV, and texting family.  I discussed facemask noninvasive ventilation with BiPAP versus the possibility of endotracheal intubation and mechanical ventilation if the patient's respiratory status was to worsen.  The patient noted that she much prefers the face mask, but it was not clear to me that she truly understood given her hypercapnia the details of mechanical ventilation.  She noted that it did not sound like something that she would want.  Her chart is listed currently is full code.  I then proceeded to call her son and reviewed the entire situation.  I spoke with Narciso, and noted that his mom has end-stage COPD, pulmonary hypertension, is oxygen dependent, has significant emphysema with bleb disease and  that she has very low tidal volumes.  Her oxygenation is fine.  Her gas exchange with CO2 is less desirable and she does have acute and chronic respiratory acidosis.  She does have substantial metabolic compensation.  I rediscussed again the provision of DNR/DNI status versus full code.  I advised that the patient was doing relatively well from a clinical standpoint, but her gas exchange was poor, and that we could maintain noninvasive ventilation at this time which is the patient's desire.  He was in agreement with that plan.  I asked if the patient were to stop breathing if the family would want mechanical ventilation and endotracheal intubation.  He noted that she would not likely want that and the family knows how serious her pulmonary situation is, but if it were the only thing left to do that it would be worth a try.  Therefore, it appears at this juncture that the patient is still full code.  Given that the patient is clinically functioning well despite the hypercapnia, and the pH is only around 7.2 (venous) we will continue with noninvasive ventilation at this point in time.  I did discuss the case in detail with Dr. Craig from the intensive care unit service who agrees that intubation could be highly risky and that we should allow the patient to continue on noninvasive ventilation at this time.  I discussed the situation with Dr. Knapp the admitting hospitalist.  We all agree that a palliative care consultation is warranted as soon as possible.    Total time spent in critical care at the bedside exclusive of procedures is 90 minutes.  Total time supervising this patient's care is 9 hours.      Diagnosis:    ICD-10-CM    1. Hospital-acquired bacterial pneumonia  J15.9    2. Chronic obstructive pulmonary disease with acute exacerbation (H)  J44.1    3. Hypoxia  R09.02    4. Generalized muscle weakness  M62.81    Hypercapnic respiratory failure    12/28/2021   Bronson Harper MD Rock, Michael P,  MD  12/28/21 1341       Bronson Harper MD  12/28/21 9300

## 2021-12-28 NOTE — PHARMACY-ADMISSION MEDICATION HISTORY
Pharmacy Medication History  Admission medication history interview status for the 12/28/2021  admission is complete. See EPIC admission navigator for prior to admission medications     Location of Interview: Patient room  Medication history sources: MAR (Rosa Isela on [facility] med list, called to clarify last doses), Patient's home med list and discharge summary 12/19/21    Significant changes made to the medication list:  None     In the past week, patient estimated taking medication this percent of the time: greater than 90%    Additional medication history information:   Staff at [facility] patient took all medications at facility 12/27/21. Staff also confirmed patient has no PRN medications in general. Patient self-administers combivent respimat - may not be taking 4 doses daily every day. Prednisone taper dosing is listed on facility med list as 20 mg daily for 3 days 12/26-29, then 10 mg daily for 3 days 12/29/21-1/1/22.     Medication reconciliation completed by provider prior to medication history? No    Time spent in this activity: 20 minutes    Prior to Admission medications    Medication Sig Last Dose Taking? Auth Provider   acetaminophen (TYLENOL) 500 MG tablet Take 1,000 mg by mouth 3 times daily  12/27/2021 at X3 Yes Vicky Ye PA-C   calcium carb-cholecalciferol 600-500 MG-UNIT CAPS Take 1 tablet by mouth daily 12/27/2021 at AM Yes Reported, Patient   COMBIVENT RESPIMAT  MCG/ACT inhaler INHALE 1 PUFF INTO THE LUNGS FOUR TIMES DAILY AS NEEDED FOR SHORTNESS OF BREATH OR DIFFICULT BREATHING OR WHEEZING  Patient taking differently: 4 times daily  12/27/2021 at patient self-administers, unknown time Yes Vicky Ye PA-C   diltiazem ER COATED BEADS (CARDIZEM CD/CARTIA XT) 120 MG 24 hr capsule Take 1 capsule (120 mg) by mouth daily 12/27/2021 at AM Yes John Harley MD   fluticasone-salmeterol (ADVAIR DISKUS) 250-50 MCG/DOSE inhaler Inhale 1 puff into the lungs 2  times daily 12/27/2021 at X2 Yes Jaron Cason MD   INCRUSE ELLIPTA 62.5 MCG/INH Inhaler INHALE 1 PUFF INTO THE LUNGS DAILY 12/27/2021 at AM Yes Ilana Solano APRN CNP   predniSONE (DELTASONE) 20 MG tablet Take 3 tabs by mouth daily x 3 days, then 2 tabs daily x 3 days, then 1 tab daily x 3 days, then 1/2 tab daily x 3 days. 12/27/2021 at AM, 20 mg Yes John Harley MD   senna-docusate (SENOKOT-S/PERICOLACE) 8.6-50 MG tablet Take 1 tablet by mouth daily 12/27/2021 at AM Yes Reported, Patient       The information provided in this note is only as accurate as the sources available at the time of update(s)   Jennie KapoorD

## 2021-12-28 NOTE — ED NOTES
DATE:  12/28/2021   TIME OF RECEIPT FROM LAB:  9087  LAB TEST:  PCO2 and Bicarb  LAB VALUE:  PCO2 111 and Bicarb 48  RESULTS GIVEN WITH READ-BACK TO (PROVIDER):  Patience Knapp MD  TIME LAB VALUE REPORTED TO PROVIDER:   1222

## 2021-12-28 NOTE — ED NOTES
[facility]  ED Nurse Handoff Report    ED Chief complaint: Agitation      ED Diagnosis:   Final diagnoses:   Hospital-acquired bacterial pneumonia   Chronic obstructive pulmonary disease with acute exacerbation (H)   Hypoxia   Generalized muscle weakness       Code Status: Check with hospitalist    Allergies: No Known Allergies    Patient Story: Pt here from [facility], called 911 as she felt care was not good.  Focused Assessment:  A/Ox4, can make needs known. Pt on 4 lpm NC complains of dyspnea. Usually on 3.5lpm at [facility]. Pt. Denies chest pain. Has eccymosis on right forearm and casted splint on left arm due to a fall at home.     Treatments and/or interventions provided: IV, labs, abx  Patient's response to treatments and/or interventions: Tolerated well    To be done/followed up on inpatient unit:  [facility]    Does this patient have any cognitive concerns?: None    Activity level - Baseline/Home:  Stand with Assist  Activity Level - Current:   Stand with assist x2    Patient's Preferred language: English   Needed?: No    Isolation: None  Infection: Not Applicable  Patient tested for COVID 19 prior to admission: YES  Bariatric?: No    Vital Signs:   Vitals:    12/28/21 0900 12/28/21 0930 12/28/21 1000 12/28/21 1135   BP:    131/72   Pulse:    108   Resp:       Temp:       TempSrc:       SpO2: 100% 100% 99% 97%       Cardiac Rhythm:     Was the PSS-3 completed:   Yes  What interventions are required if any?               Family Comments: Son  OBS brochure/video discussed/provided to patient/family: N/A              Name of person given brochure if not patient: NA              Relationship to patient: NA    For the majority of the shift this patient's behavior was Green.   Behavioral interventions performed were None.    ED NURSE PHONE NUMBER: RN [phone]

## 2021-12-28 NOTE — H&P
"[facility]    History and Physical - Hospitalist Service       Date of Admission:  12/28/2021    Addendum: Patient seen again in ED, second visit.  Serial venous blood gases show profound hypercarbia.  She is awake but does not remember meeting me earlier today.  We discussed options for her care, specifically that she may fatigue/become lethargic even though she is on BiPAP and if that happens, she would either need intubation and mechanical ventilation, \"life support\" or emphasis on her comfort, hospice care.  She answered with a number of metaphors, including,\" I am walking the line,\" and, \"He's (God has) brought me through the fire.\"    I asked her to be very specific regarding her wishes.  She says, \"I will make it easy for you,\" indicates she has an advance directive.  We do not have her advance directive on file, she says her son, Narciso, has it, he will bring it to the hospital.  She also says her son Narciso is her designated medical decision-maker I told her Narciso says that she should be intubated and on the ventilator \"as a last resort.\"  She did not disagree.    For now, continue BiPAP and other medical cares as outlined below.  If she has a cardiac or respiratory arrest, her current advance directive indicates she is full code.    Assessment & Plan      Carmen Perez is a 71 year old female admitted on 12/28/2021. She was recently admitted to [facility] from 12/16-12/19/2021 for acute exacerbation of severe COPD, also open fracture of the left fourth proximal phalanx, [facility], came to the emergency department with \"agitation\" stating that staff at [place] were not attentive to her needs.  She called 911.  Here, chest x-ray suggests left upper lobe infiltrate.    Principal Problem:  Pneumonia due to infectious organism    Admit as inpatient    Checked procalcitonin, it is modestly elevated    Treat with supplemental oxygen    Add nebulized " "bronchodilators    Resume parenteral corticosteroids    Treat with broad-spectrum antibiotics  Active Problems:  Acute encephalopathy    See remarks from EMS run sheet, some of patient's remarks suggest paranoia or psychosis    May be related to hypercarbia and/or infection    Treat COPD, pneumonia    If symptoms persist when underlying medical conditions are treated, consider psychiatry consult    COPD, severe (H), with exacerbation    Acute on chronic respiratory failure with hypoxia and hypercarbia, on home oxygen therapy (H)    Typically uses 3.5 L of oxygen continuously    On admission, oxygen saturation was 100% on 3 L per nasal cannula    Subsequent venous blood gas showed PCO2 122 mmHg, BiPAP has been applied    Resume parenteral corticosteroids, nebulized bronchodilators    Wean BiPAP as able  Paroxysmal SVT    At outside hospital, patient had Intermittent tachycardia with heart rate around 130, other times in NSR with normal rates.  \"Telemetry and EKG difficult to assess whether this is A. fib or other SVT, but given the tachycardia is very regular and marches out on EKG more likely SVT other than A. Fib.\"    Started on oral diltiazem here and heart rate improved when she is in SVT.    Continue diltiazem extended release 120 mg daily    Not definitively A. fib and patient would like to minimize pill burden so did not start anticoagulation.\"    Recheck EKG    S/P colostomy (H) - since colonic perforation in 5/2016 due to sigmoid diverticulitis    Noted    Pulmonary HTN , group 3    Noted    Open displaced fracture of proximal phalanx of left ring finger, initial encounter    Carmen tripped at home on her oxygen tubing    She was seen by orthopedics at [facility], they applied a cast, recommended a course of oral Keflex and outpatient follow-up with Ortho    Anemia in other chronic diseases classified elsewhere    Recheck CBC        Diet:   Regular  DVT Prophylaxis: Enoxaparin (Lovenox) SQ  Quinn " "Catheter: Not present  Central Lines: None  Code Status:   Full    Clinically Significant Risk Factors Present on Admission                    Disposition Plan   Expected Discharge:  likely medically ready for discharge as soon as tomorrow   Anticipated discharge location:  Awaiting care coordination huddle  Delays:     ready for discharge when responding to antibiotic therapy and alternate TCU has been identified        The patient's care was discussed with the Patient and Dr. Harper.    Patience Knapp MD  [facility]  Securely message with the Vocera Web Console (learn more here)  Text page via [employer] Paging/Directory      _____________________________________________________________________    Chief Complaint   \"They were just hateful.\"    History of Present Illness   Carmen Perez is a 71 year old female with oxygen-dependent COPD, pulmonary hypertension, recently admitted to [facility] from 12/16-12/19/2021 for acute exacerbation of severe COPD, also open fracture of the left fourth proximal phalanx, currently at [facility].    Notes from [facility] indicate, \"This is a 70 yo female who has chronic hypoxic respiratory failure on 3.5 L of oxygen who is unvaccinated for COVID-19 and has been isolating in her home for approximately 2 years who presented after a fall after tripping on her nasal cannula cord.  She was found to have significant hypercapnia requiring BiPAP and sustained a left fourth proximal phalanx commuted fracture.  Ortho was consulted and recommended oral Keflex and follow-up in the clinic.  Hypercapnia improved, started on Solu-Medrol for COPD exacerbation then changed to prednisone taper at discharge.  Also had intermittent A. fib versus SVT, started on oral diltiazem which improved rate and elected not to start AC.  Not interested in NIPPV at home.  Advised to get vaccinated.  Also apparently stopped her Lexapro a week ago as it seemed not to be " "helpful, will follow up outpatient.  No other changes noted so was [facility].\"    This morning, she called 911 from [place], saying that she was not receiving proper care at the facility.  EMS run sheet indicates, \"Arrive [place] to find patient in her bed awaiting transport.  Patient stated that her needs were not being met, said she needed to void urine and her ostomy bag was full.  Patient has a history of COPD and she is breathing per her norm...... nursing staff burped her bag and patient urinated in the bed. Patient is on low-flow O2, NC, staff on scene stated she has begun to be verbally abusive to staff, calling them racist remarks.  I asked patient about said behavior and she said they deserved it and that they cannot even simply care for her basic needs.  Patient was without pants and underwear, she feels she has not been cared for appropriately patient was transported across the street to [facility].\"    During our interview, patient said she called paramedics, \"because of what I saw on the TV.  They have cameras, you know, that is not normal.  I did not give my consent for that.\"  I asked patient to elaborate on her statement and she became somewhat suspicious, \"you are not from that place, are you?\"  She then said that her son would tell her to stop talking and she would not say any more about her concerns.     Work-up here includes chest x-ray which shows, \"increasing patchy opacities at the left upper lateral lung, may be progression of pneumonia.  Emphysematous changes noted bilaterally.\"  Procalcitonin is modestly elevated.  Covid PCR is negative (please note, patient is unvaccinated.)  Venous blood gas shows hypercarbia.  She is admitted for further evaluation and treatment of possible healthcare associated pneumonia and COPD exacerbation.    Review of Systems    The 10 point Review of Systems is negative other than noted in the HPI or here.     Past Medical History  "   I have reviewed this patient's medical history and updated it with pertinent information if needed.   Past Medical History:   Diagnosis Date     Asthma     worse in the summer     COPD (chronic obstructive pulmonary disease) (H)     previous smoker     Cor pulmonale (H)      Hyperkalemia 2012     Mild major depression (H)      Myocardial infarction (H)     on furosemide       Past Surgical History   I have reviewed this patient's surgical history and updated it with pertinent information if needed.  Past Surgical History:   Procedure Laterality Date      SECTION       HERNIORRHAPHY INCISIONAL (LOCATION) N/A 2016    Procedure: HERNIORRHAPHY INCISIONAL (LOCATION);  Surgeon: Bronson Ornelas MD;  Location:  OR     LAPAROSCOPIC ASSISTED COLECTOMY N/A 2016    Procedure: LAPAROSCOPIC ASSISTED COLECTOMY;  Surgeon: Bronson Ornelas MD;  Location:  OR     LAPAROSCOPIC ASSISTED COLOSTOMY N/A 2016    Procedure: LAPAROSCOPIC ASSISTED COLOSTOMY;  Surgeon: Bronson Ornelas MD;  Location:  OR     LAPAROSCOPIC TUBAL LIGATION         Social History   I have reviewed this patient's social history and updated it with pertinent information if needed.  Social History     Tobacco Use     Smoking status: Former Smoker     Packs/day: 0.25     Types: Cigarettes     Quit date: 2012     Years since quittin.5     Smokeless tobacco: Never Used   Substance Use Topics     Alcohol use: No     Alcohol/week: 0.0 standard drinks     Drug use: No       Family History   I have reviewed this patient's family history and updated it with pertinent information if needed.  Family History   Problem Relation Age of Onset     C.A.D. Mother          in her 60's       Prior to Admission Medications   Prior to Admission Medications   Prescriptions Last Dose Informant Patient Reported? Taking?   COMBIVENT RESPIMAT  MCG/ACT inhaler   No No   Sig: INHALE 1 PUFF INTO THE LUNGS FOUR TIMES DAILY  AS NEEDED FOR SHORTNESS OF BREATH OR DIFFICULT BREATHING OR WHEEZING   Patient taking differently: 4 times daily    INCRUSE ELLIPTA 62.5 MCG/INH Inhaler   No No   Sig: INHALE 1 PUFF INTO THE LUNGS DAILY   acetaminophen (TYLENOL) 500 MG tablet   Yes No   Sig: Take 1,000 mg by mouth 3 times daily    calcium carb-cholecalciferol 600-500 MG-UNIT CAPS   Yes No   Sig: Take 1 tablet by mouth daily   diltiazem ER COATED BEADS (CARDIZEM CD/CARTIA XT) 120 MG 24 hr capsule   No No   Sig: Take 1 capsule (120 mg) by mouth daily   fluticasone-salmeterol (ADVAIR DISKUS) 250-50 MCG/DOSE inhaler   No No   Sig: Inhale 1 puff into the lungs 2 times daily   predniSONE (DELTASONE) 20 MG tablet   No No   Sig: Take 3 tabs by mouth daily x 3 days, then 2 tabs daily x 3 days, then 1 tab daily x 3 days, then 1/2 tab daily x 3 days.   senna-docusate (SENOKOT-S/PERICOLACE) 8.6-50 MG tablet   Yes No   Sig: Take 1 tablet by mouth daily      Facility-Administered Medications: None     Allergies   No Known Allergies    Physical Exam   Vital Signs: Temp: 98.6  F (37  C) Temp src: Oral BP: 131/72 Pulse: 108   Resp: 20 SpO2: 97 % O2 Device: Nasal cannula Oxygen Delivery: 4 LPM  Weight: 0 lbs 0 oz    Constitutional: Awake, alert  Eyes: Conjunctiva and pupils examined and normal.  HEENT: Moist mucous membranes, normal dentition.  Respiratory: She is using accessory muscles of respiration, is breathless with extended speech, but maintains this is her baseline.  Breath sounds are decreased throughout  Cardiovascular: Tachycardic, regular rhythm  GI: Soft, non-distended, non-tender, normal bowel sounds.  Ostomy appliance has some soft, brown effluent  Skin: No rash on exposed skin  Musculoskeletal: No joint swelling, erythema or tenderness.  Neurologic: Speech is fluent.  Moves arms and legs.  Neurologic exam is nonfocal  Psychiatric: Mood is somewhat guarded and suspicious      Data   Data reviewed today: I reviewed all medications, new labs and imaging  results over the last 24 hours. I personally reviewed the chest x-ray image(s) showing left upper lobe infiltrate.    Recent Labs   Lab 12/28/21  1102 12/27/21  0850   WBC 16.2* 14.5*   HGB 10.2* 10.1*   * 101*    317    139   POTASSIUM 4.3 3.8   CHLORIDE 98 93*   CO2 >45* >45*   BUN 17 18   CR 0.55 0.56   ANIONGAP <1* <1*   OLI 8.6 9.5   GLC 98 76   ALBUMIN 3.1*  --    PROTTOTAL 6.5*  --    BILITOTAL 0.2  --    ALKPHOS 19*  --    ALT 27  --    AST 13  --      Recent Results (from the past 24 hour(s))   XR Chest Port 1 View    Narrative    CHEST PORTABLE ONE VIEW   12/28/2021 10:51 AM     HISTORY: COPD.  Cough.  Question acute pneumonia.     COMPARISON: Chest x-ray 12/16/2021.      Impression    IMPRESSION: Increasing patchy opacities at the left upper lateral lung  may be progression of pneumonia. Emphysematous changes noted  bilaterally along with some interstitial prominence. Normal cardiac  silhouette.     BRI NARVAEZ MD         SYSTEM ID:  [id number]

## 2021-12-28 NOTE — PROGRESS NOTES
[facility] GERIATRIC SERVICES    Chief Complaint   Patient presents with     RECHECK     HPI:  Carmen Perez is a 71 year old  (1950), who is being seen today for an episodic care visit at: [facility] ([facility]) [[id number]].     This is a 72 yo female who has chronic hypoxic respiratory failure on 3.5 L of oxygen who is unvaccinated for COVID-19 and has been isolating in her home for approximately 2 years who presented after a fall after tripping on her nasal cannula cord.  She was found to have significant hypercapnia requiring BiPAP and sustained a left fourth proximal phalanx commuted fracture.  Ortho was consulted and recommended oral Keflex and follow-up in the clinic.  Hypercapnia improved, started on Solu-Medrol for COPD exacerbation then changed to prednisone taper at discharge.  Also had intermittent A. fib versus SVT, started on oral diltiazem which improved rate and elected not to start AC. Chest x-ray does not show any infiltrate.  She is afebrile no leukocytosis.  COVID-19 PCR is negative.  Not interested in NIPPV at home.  Advised to get vaccinated.  Also apparently stopped her Lexapro a week ago as it seemed not to be helpful, will follow up outpatient.  No other changes noted so was discharged to [facility] for rehab.    Today's concern is:   AMS    Allergies, and PMH/PSH reviewed in [facility] today.  REVIEW OF SYSTEMS:  4 point ROS including Respiratory, CV, GI and , other than that noted in the HPI,  is negative    Objective:   BP (!) 145/77   Pulse 89   Temp (!) 96.4  F (35.8  C)   Resp 20   Wt 55.3 kg (122 lb)   SpO2 96%   BMI 21.61 kg/m    GENERAL APPEARANCE:  Alert, hallucinating, morbidly obese, generalized pain  RESP:  respiratory effort and palpation of chest normal, more respiratory distress, diminished breath sounds bilaterally, GEE, 3.5L NC  CV:  Palpation and auscultation of heart done , no edema, irregular rhythm rate controlled A-fib  PSYCH:  oriented to 2/3. SLUMS 23/30      Most  Recent 3 CBC's:  Recent Labs   Lab Test 12/27/21  0850 12/19/21  0612 12/16/21  0937 01/11/20  1155   WBC 14.5*  --  10.9 7.4   HGB 10.1*  --  10.8* 11.1*   *  --  101* 89    299 371 304     Most Recent 3 BMP's:  Recent Labs   Lab Test 12/27/21  0850 12/19/21  0612 12/18/21  0659 12/17/21  0639     --  139 139   POTASSIUM 3.8  --  4.8 4.4   CHLORIDE 93*  --  98 98   CO2 >45*  --  41* 36*   BUN 18  --  27 27   CR 0.56 0.59 0.53 0.56   ANIONGAP <1*  --  <1* 5   OLI 9.5  --  9.1 9.0   GLC 76  --  118* 97       Assessment/Plan:    (J96.11,  Z99.81) Chronic respiratory failure with hypoxia, on home oxygen therapy (H)  (primary encounter diagnosis)  (J44.1) COPD exacerbation (H)  Underwent exacerbation, required NIPPV, declined placement at home.  Chronically on 3.5 L nasal cannula.  Ongoing steroid taper (complete on 1/1/22). Combivent 20/100 MCG 1 puff 4 times daily, Advair 250/50 MCG 1 puff twice daily, and Ellipta 6.25 MCG 1 puff daily    Hypercapnia  Last CO2 >45, advised to keep sats >86%, today more confusion with probable worsening retention, will be sent to the ED for further evaluation     Unvaccinated per Covid  Schedule vaccination for next week, would like Pfizer     (H62.936W) Open displaced fracture of proximal phalanx of left ring finger  Given keflex, complete on 12/26. F/u with ortho per Grace CNP on 12/30     No A. fib versus SVT  (I27.20) Pulmonary HTN - probably secondary to COPD per cardiology  Hx of mild to moderate pulmonary hypertension.  TTE EF 55-60%, mild to moderate pulmonary HTN, dilated IVC, one-2+ MR, and 1+ TR. Continue diltiazem 120 mg daily. SBP <120, HR <80s     Pain  Tylenol 1000 mg 3 times daily, denies pain     (K57.20) Diverticulitis of large intestine with perforation, unspecified bleeding status - 5/2016 requiring emergency colostomy placement  Manages ostomy per self. senna S1 tab daily      Macrocytic hyperchromic anemia  Last hemoglobin 10.1 with  on  12/27     (F33.42) Recurrent major depression in complete remission (H)  Recently stopped Escitalopram, monitor mood     Therapy  Ambulating 10ft with FWW    Orders:  Sent to ED per AMS    Electronically signed by: Bronson Colby NP

## 2021-12-28 NOTE — ED NOTES
DATE:  12/28/2021   TIME OF RECEIPT FROM LAB: 1718  RESULTS GIVEN WITH READ-BACK TO (PROVIDER):  Bronson Harper MD  TIME LAB VALUE REPORTED TO PROVIDER:   1968    pCO2 Venous 123 mm Hg    Bicarbonate Venous  49 mmol/L

## 2021-12-29 NOTE — CONSULTS
Care Management Initial Consult    General Information  Assessment completed with: Patient,         Primary Care Provider verified and updated as needed:     Readmission within the last 30 days:           Advance Care Planning: Advance Care Planning Reviewed: no concerns identified          Communication Assessment  Patient's communication style: spoken language (English or Bilingual)    Hearing Difficulty or Deaf: no   Wear Glasses or Blind: yes    Cognitive  Cognitive/Neuro/Behavioral: mood/behavior,orientation  Level of Consciousness: alert  Arousal Level: opens eyes spontaneously  Orientation: other (see comments) (appears confused with certain situations)  Mood/Behavior: agitated,anxious,restless,uncooperative  Best Language: 0 - No aphasia  Speech: hyperverbal/rapid,rambling    Living Environment:   People in home: alone     Current living Arrangements: apartment      Able to return to prior arrangements:         Family/Social Support:  Care provided by: self,child(angi)  Provides care for: no one     Children          Description of Support System: Involved         Current Resources:   Patient receiving home care services:       Community Resources:    Equipment currently used at home: cane, straight,walker, standard,wheelchair, manual  Supplies currently used at home:      Employment/Financial:  Employment Status:          Financial Concerns:             Lifestyle & Psychosocial Needs:  Social Determinants of Health     Tobacco Use: Medium Risk     Smoking Tobacco Use: Former Smoker     Smokeless Tobacco Use: Never Used   Alcohol Use: Not on file   Financial Resource Strain: Not on file   Food Insecurity: Not on file   Transportation Needs: Not on file   Physical Activity: Not on file   Stress: Not on file   Social Connections: Not on file   Intimate Partner Violence: Not on file   Depression: Not at risk     PHQ-2 Score: 1   Housing Stability: Not on file       Functional Status:  Prior to admission patient  "needed assistance:   Dependent ADLs:: Ambulation-cane,Ambulation-walker,Wheelchair-independent  Dependent IADLs:: Shopping,Transportation       Mental Health Status:          Chemical Dependency Status:                Values/Beliefs:  Spiritual, Cultural Beliefs, Yazidi Practices, Values that affect care: no               Additional Information:  Writer spoke with pt, but pt is not completely alert and oriented due to her CO2 levels per nursing (writer was able to notice this during conversation and after conversation RN confirmed this).     During conversation, pt stated she did not want to return to [facility]. She stated they were not responsive when she needed things. She stated that she would like to go home, but would be willing to go to another facility. Pt stated that she is not vaccinated, but would be willing to get the Pfizer covid vaccine. Pt stated that she would like her things from [facility].     Pt went on to state that the police brought her things over and that they are not here because someone stole them (a vm was left with [facility], it is likely that they are at [facility] still). She also stated that she is going to start a movement called \"old people matter\" because she believes old people deserve better care.     Pt was aware that her CO2 levels were high.    NELL Rogers        "

## 2021-12-29 NOTE — PROGRESS NOTES
"   12/29/21 1344   Quick Adds   Type of Visit Initial PT Evaluation   Living Environment   People in home alone   Current Living Arrangements apartment   Living Environment Comments patient most recently has been at TCU; son lives in area but unable to provide assistance other than meals   Self-Care   Usual Activity Tolerance fair   Current Activity Tolerance poor   Equipment Currently Used at Home wheelchair, manual;cane, straight;walker, rolling   Activity/Exercise/Self-Care Comment per chart: Patient independent with all ADLs/iADLs at baseline, uses 3.5L O2 at home. Patient reports gradually decreased mobility over the past two years. Reports limited mobility due to chronic shortness of breath   Disability/Function   Wear Glasses or Blind yes   Vision Management glasses   Walking or Climbing Stairs Difficulty yes   Walking or Climbing Stairs ambulation difficulty, requires equipment   Dressing/Bathing bathing difficulty, requires equipment   Toileting Assistance toileting difficulty, requires equipment   Fall history within last six months yes   Number of times patient has fallen within last six months 1   Change in Functional Status Since Onset of Current Illness/Injury yes   General Information   Onset of Illness/Injury or Date of Surgery 12/28/21   Referring Physician Patience Knapp MD   Patient/Family Therapy Goals Statement (PT) open to TCU; not \"[place]\"   Pertinent History of Current Problem (include personal factors and/or comorbidities that impact the POC) per chart: Carmen Perez is a 71 year old female admitted on 12/28/2021. She was recently admitted to [facility] from 12/16-12/19/2021 for acute exacerbation of severe COPD, also open fracture of the left fourth proximal phalanx, [facility], came to the emergency department with \"agitation\" stating that staff at [facility] were not attentive to her needs.  She called 911.  Here, chest x-ray suggests left upper lobe " infiltrate; Venous blood gas shows hypercarbia.  She is admitted for further evaluation and treatment of possible healthcare associated pneumonia and COPD exacerbation.; acute encephalopathy   Existing Precautions/Restrictions fall;oxygen therapy device and L/min;other (see comments)  (6L)   General Observations patient in bed; agreeable to session; son present   Cognition   Orientation Status (Cognition) oriented x 3   Affect/Mental Status (Cognition) confabulatory   Follows Commands (Cognition) 75-90% accuracy   Safety Deficit (Cognition) insight into deficits/self-awareness;safety precautions follow-through/compliance   Memory Deficit (Cognition) other (see comments)  (impaired memory noted during session)   Pain Assessment   Patient Currently in Pain No   Integumentary/Edema   Integumentary/Edema Comments see nursing notes for further information   Posture    Posture Comments forward flexed at head and trunk   Range of Motion (ROM)   ROM Comment WFL   Strength   Strength Comments significant functional weakness; poor activity tolerance   Bed Mobility   Comment (Bed Mobility) SBA, HOB elevated; use of bedrail   Transfers   Transfer Safety Comments sit>stand with min A; shaky on feet; poor balance   Gait/Stairs (Locomotion)   Comment (Gait/Stairs) elevated HR preventing gait this pm; mid 120's just in standing in place   Balance   Balance Comments impaired; braces LE's against bed; falls risk   Clinical Impression   Criteria for Skilled Therapeutic Intervention yes, treatment indicated   PT Diagnosis (PT) impaired functional mobility   Influenced by the following impairments functional weakness; poor activity tolerance; impaired balance; impaired cognition   Functional limitations due to impairments impaired independence with functional mobility and cares secondary to above deficits   Clinical Presentation Evolving/Changing   Clinical Presentation Rationale clinical judgement; level of assist   Clinical Decision  Making (Complexity) moderate complexity   Therapy Frequency (PT) 5x/week   Predicted Duration of Therapy Intervention (days/wks) 5 days   Planned Therapy Interventions (PT) bed mobility training;transfer training;patient/family education;gait training;progressive activity/exercise   Anticipated Equipment Needs at Discharge (PT) walker, rolling;wheelchair   Risk & Benefits of therapy have been explained evaluation/treatment results reviewed;care plan/treatment goals reviewed;risks/benefits reviewed;current/potential barriers reviewed;participants voiced agreement with care plan;participants included;patient;son   PT Discharge Planning    PT Discharge Recommendation (DC Rec) Transitional Care Facility;Long term care facility   PT Rationale for DC Rec Patient below reported baseline level of function; may benefit from TCU to maximize return to PLOF; doesn't appear safe to return home in current condition; may benefit from a more supportive living environment after TCU   PT Brief overview of current status  poor tolerance to standing at EOB; unsteady; falls risk   Total Evaluation Time   Total Evaluation Time (Minutes) 10

## 2021-12-29 NOTE — PLAN OF CARE
Pt oriented x4 but is extremely forgetful. VSS on 3.5L O2, wore Bipap until around 0400 when RT transferred her to nasal cannula. Lung sounds diminished. Tele NSR. Incontinent of bladder, purewick in place. Colostomy w/ small amount of stool output. CMS intact. L arm cast in place. Bruising to R forearm. Up w/ 1. NPO. Denies pain.

## 2021-12-29 NOTE — CONSULTS
Pulmonology Consultation      Carmen Perez MRN# [id number]   YOB: 1950 Age: 71 year old   Date of Admission: 12/28/2021     Reason for consult: I was asked by Dr Knapp to evaluate this patient for Acute on chronic hypercapnic respiratory failure. .           Assessment and Plan:     Chronic respiratory failure with hypoxia, on home oxygen therapy (H)    COPD, severe (H)    S/P colostomy (H) - since colonic perforation in 5/2016 due to sigmoid diverticulitis    Pulmonary HTN - probably secondary to COPD per cardiology    Open displaced fracture of proximal phalanx of left ring finger, initial encounter    Pneumonia due to infectious organism    Anemia in other chronic diseases classified elsewhere    * No resolved hospital problems. *     Acute hypercapnic respiratory failure  Encephalopathy due to hypercarbia  End stage copd on 3.5 l o2 at baseline  L 4th finger fx due to trip over o2 tubing  LIZBETH infiltrate ; did not resolve with abx          Empiric antibiotics  Chest CT scan tomorrow  Steroids, taper slowly to 10 mg per day and stay there pending Pulmonary follow up with her regular Pulmonologist, Dr Mims in our group.    Titrate fiO2 down and target sats of 88-94% range at rest on low flow O2.   BiPAP with breaks ad ally  Avoid sedatives  Due to bouts of tachycardia, stop LABA and avoid in future. Spiriva, daily prednisone, and prn albuterol nebs would be a good regimen for her in future.  Repeat CXR in 2 weeks to b sure infiltrate clears if not completely cleared at discharge  Est LOS at least 3-4 days.  Anticipate transfer to TCU for rehab. She will not go back to [facility] and is very upset at care there.  Thank you for asking us to see her, agree with your plan of care and will comment again after CT this rajan     KRGromerMD             Chief Complaint:   Acute on chronic hypercarbia, confusion, and dyspnea with abnormal CXR    History is obtained from the patient and electronic health  "record         History of Present Illness:   This patient is a 71 year old female who presents with complaints of poor care at the [facility] where she was staying after a stay at [facility] for COPD exacerbation; she was paranoid and angry at [facility] and had severe hypercarbia on ABG today upon ED evaluation. She has been on biPAP all day today and is feeling much better she says. No new cough or sputum but very unhappy about personal care at [facility].     Her history is outlined in her note today by Dr Knapp:     \"  Addendum: Patient seen again in ED, second visit.  Serial venous blood gases show profound hypercarbia.  She is awake but does not remember meeting me earlier today.  We discussed options for her care, specifically that she may fatigue/become lethargic even though she is on BiPAP and if that happens, she would either need intubation and mechanical ventilation, \"life support\" or emphasis on her comfort, hospice care.  She answered with a number of metaphors, including,\" I am walking the line,\" and, \"He's (God has) brought me through the fire.\"     I asked her to be very specific regarding her wishes.  She says, \"I will make it easy for you,\" indicates she has an advance directive.  We do not have her advance directive on file, she says her son, Narciso, has it, he will bring it to the hospital.  She also says her son Narciso is her designated medical decision-maker I told her Narciso says that she should be intubated and on the ventilator \"as a last resort.\"  She did not disagree.     For now, continue BiPAP and other medical cares as outlined below.  If she has a cardiac or respiratory arrest, her current advance directive indicates she is full code.        Assessment & Plan     Carmen Perez is a 71 year old female admitted on 12/28/2021. She was recently admitted to [facility] from 12/16-12/19/2021 for acute exacerbation of severe COPD, also open fracture of the left fourth proximal phalanx, currently " "at [facility], came to the emergency department with \"agitation\" stating that staff at [facility] were not attentive to her needs.  She called 911.  Here, chest x-ray suggests left upper lobe infiltrate.                     Past Medical History:     Past Medical History:   Diagnosis Date     Asthma     worse in the summer     COPD (chronic obstructive pulmonary disease) (H)     previous smoker     Cor pulmonale (H)      Hyperkalemia 2012     Mild major depression (H)      Myocardial infarction (H)     on furosemide             Past Surgical History:     Past Surgical History:   Procedure Laterality Date      SECTION       HERNIORRHAPHY INCISIONAL (LOCATION) N/A 2016    Procedure: HERNIORRHAPHY INCISIONAL (LOCATION);  Surgeon: Bronson Ornelas MD;  Location:  OR     LAPAROSCOPIC ASSISTED COLECTOMY N/A 2016    Procedure: LAPAROSCOPIC ASSISTED COLECTOMY;  Surgeon: Bronson Ornelas MD;  Location:  OR     LAPAROSCOPIC ASSISTED COLOSTOMY N/A 2016    Procedure: LAPAROSCOPIC ASSISTED COLOSTOMY;  Surgeon: Bronson Ornelas MD;  Location:  OR     LAPAROSCOPIC TUBAL LIGATION                 Social History:     Social History     Tobacco Use     Smoking status: Former Smoker     Packs/day: 0.25     Types: Cigarettes     Quit date: 2012     Years since quittin.5     Smokeless tobacco: Never Used   Substance Use Topics     Alcohol use: No     Alcohol/week: 0.0 standard drinks             Family History:     Family History   Problem Relation Age of Onset     C.A.D. Mother          in her 60's             Immunizations:     Immunization History   Administered Date(s) Administered     Influenza (High Dose) 3 valent vaccine 2014, 09/15/2015, 2016, 2017, 2019     Influenza (IIV3) PF 2012, 2012, 2013, 2014, 10/01/2015     Influenza Vaccine IM Ages 6-35 Months 4 Valent (PF) 2013     Pneumo Conj 13-V (2010&after) 2016 "     Pneumococcal 23 valent 08/06/2012, 11/03/2017     TDAP Vaccine (Adacel) 07/10/2014     Tdap (Adacel,Boostrix) 07/10/2014     Zoster vaccine, live 10/09/2013             Allergies:   No Known Allergies          Medications:     Current Facility-Administered Medications Ordered in Epic   Medication Dose Route Frequency Last Rate Last Admin     acetaminophen (TYLENOL) tablet 650 mg  650 mg Oral Q6H PRN        Or     acetaminophen (TYLENOL) Suppository 650 mg  650 mg Rectal Q6H PRN         acetaminophen (TYLENOL) tablet 1,000 mg  1,000 mg Oral TID         albuterol (PROVENTIL) neb solution 2.5 mg  2.5 mg Nebulization Q6H PRN         carboxymethylcellulose PF (REFRESH PLUS) 0.5 % ophthalmic solution 1 drop  1 drop Both Eyes Q1H PRN         diltiazem ER COATED BEADS (CARDIZEM CD/CARTIA XT) 24 hr capsule 120 mg  120 mg Oral Daily         enoxaparin ANTICOAGULANT (LOVENOX) injection 30 mg  30 mg Subcutaneous Q24H         fluticasone-salmeterol (ADVAIR) 250-50 MCG/DOSE diskus inhaler 1 puff  1 puff Inhalation BID         ipratropium - albuterol 0.5 mg/2.5 mg/3 mL (DUONEB) neb solution 3 mL  3 mL Nebulization 4x Daily         lidocaine (LMX4) cream   Topical Q1H PRN         lidocaine 1 % 0.1-1 mL  0.1-1 mL Other Q1H PRN         melatonin tablet 1 mg  1 mg Oral At Bedtime PRN         methylPREDNISolone sodium succinate (solu-MEDROL) injection 62.5 mg  62.5 mg Intravenous Q6H         No lozenges or gum should be given while patient on BIPAP/AVAPS/AVAPS AE   Does not apply Continuous PRN         ondansetron (ZOFRAN-ODT) ODT tab 4 mg  4 mg Oral Q6H PRN        Or     ondansetron (ZOFRAN) injection 4 mg  4 mg Intravenous Q6H PRN         Patient may continue current oral medications   Does not apply Continuous PRN         piperacillin-tazobactam (ZOSYN) 4.5 g vial to attach to  mL bag  4.5 g Intravenous Q6H         senna-docusate (SENOKOT-S/PERICOLACE) 8.6-50 MG per tablet 1 tablet  1 tablet Oral BID PRN        Or      senna-docusate (SENOKOT-S/PERICOLACE) 8.6-50 MG per tablet 2 tablet  2 tablet Oral BID PRN         senna-docusate (SENOKOT-S/PERICOLACE) 8.6-50 MG per tablet 1 tablet  1 tablet Oral Daily         sodium chloride (PF) 0.9% PF flush 3 mL  3 mL Intracatheter Q8H         sodium chloride (PF) 0.9% PF flush 3 mL  3 mL Intracatheter q1 min prn         No current [facility]-ordered outpatient medications on file.             Review of Systems:   The 5 point Review of Systems is negative other than noted in the HPI            Physical Exam:     Vitals were reviewed  Patient Vitals for the past 8 hrs:   BP Pulse SpO2   12/28/21 1844 -- -- 92 %   12/28/21 1842 -- -- (!) 85 %   12/28/21 1840 -- -- 98 %   12/28/21 1833 -- -- 100 %   12/28/21 1730 -- -- 95 %   12/28/21 1720 -- -- 100 %   12/28/21 1645 -- -- 99 %   12/28/21 1640 (!) 119/90 111 100 %   12/28/21 1600 -- -- 100 %   12/28/21 1500 -- -- 99 %   12/28/21 1345 -- -- 99 %   12/28/21 1330 110/65 96 99 %   12/28/21 1230 -- -- 99 %   12/28/21 1200 -- -- 100 %   12/28/21 1135 131/72 108 97 %     Alert watching tv upright in bed on FFM biPAP  Pleasant lady nad able to give history nad ox  No cough   No rhonchi  Speaks full sentences  No tachypnea  NSR on tele  RRR  -CCE       Data:     Lab Results   Component Value Date    WBC 16.2 (H) 12/28/2021    WBC 14.5 (H) 12/27/2021    WBC 10.9 12/16/2021    HGB 10.2 (L) 12/28/2021    HGB 10.1 (L) 12/27/2021    HGB 10.8 (L) 12/16/2021    HCT 39.0 12/28/2021    HCT 37.1 12/27/2021    HCT 39.3 12/16/2021     12/28/2021     12/27/2021     12/19/2021     12/28/2021     12/27/2021     12/18/2021    POTASSIUM 4.3 12/28/2021    POTASSIUM 3.8 12/27/2021    POTASSIUM 4.8 12/18/2021    CHLORIDE 98 12/28/2021    CHLORIDE 93 (L) 12/27/2021    CHLORIDE 98 12/18/2021    CO2 >45 (HH) 12/28/2021    CO2 >45 (HH) 12/27/2021    CO2 41 (H) 12/18/2021    BUN 17 12/28/2021    BUN 18 12/27/2021    BUN 27 12/18/2021    CR 0.55  12/28/2021    CR 0.56 12/27/2021    CR 0.59 12/19/2021    GLC 98 12/28/2021    GLC 76 12/27/2021     (H) 12/18/2021    DD <0.2 06/12/2012    NTBNPI 4380 (H) 06/11/2012    TROPI <0.012 06/12/2012    TROPI <0.012 06/12/2012    TROPI 0.737 (HH) 06/11/2012    AST 13 12/28/2021    AST 26 01/11/2020    AST 17 02/04/2019    ALT 27 12/28/2021    ALT 24 01/11/2020    ALT 23 02/04/2019    ALKPHOS 19 (L) 12/28/2021    ALKPHOS 20 (L) 01/11/2020    ALKPHOS 19 (L) 02/04/2019    BILITOTAL 0.2 12/28/2021    BILITOTAL 0.2 01/11/2020    BILITOTAL 0.2 02/04/2019   Results for GEOFF BRUCE (MRN [id number]) as of 12/28/2021 19:13   Ref. Range 12/28/2021 11:03 12/28/2021 12:06 12/28/2021 15:15 12/28/2021 16:49   FIO2 Unknown  4 50 99   Ph Venous Latest Ref Range: 7.32 - 7.43   7.21 (L) 7.24 (L) 7.21 (L)   PCO2 Venous Latest Ref Range: 40 - 50 mm Hg  122 (HH) 111 (HH) 123 (HH)   PO2 Venous Latest Ref Range: 25 - 47 mm Hg  23 (L) 31 15 (L)   Bicarbonate Venous Latest Ref Range: 21 - 28 mmol/L  49 (HH) 48 (HH) 49 (HH)   Base Excess Venous Latest Ref Range: -7.7 - 1.9 mmol/L  17.0 (H) 16.7 (H) 16.7 (H)   Oxyhemoglobin Venous Latest Ref Range: 70 - 75 %  32 (L) 52 (L) 15 (L)   SARS CoV2 PCR Latest Ref Range: Negative  Negative          Narrative & Impression   CHEST PORTABLE ONE VIEW   12/28/2021 10:51 AM      HISTORY: COPD.  Cough.  Question acute pneumonia.      COMPARISON: Chest x-ray 12/16/2021.                                                                      IMPRESSION: Increasing patchy opacities at the left upper lateral lung  may be progression of pneumonia. Emphysematous changes noted  bilaterally along with some interstitial prominence. Normal cardiac  silhouette.      BRI NARVAEZ MD           All imaging studies reviewed by me.

## 2021-12-29 NOTE — PLAN OF CARE
MD Notification    Notified Person: Dr. Knapp    Notification Date/Time: 12/29/2021 5:29 PM     Notification Interaction: Text    Purpose of Notification: Critical lab. PCO2 = 78, Bicarb = 46.     Orders Received: Waiting    Comments: NA

## 2021-12-29 NOTE — CONSULTS
Pulmonology Follow Up Note    Carmen Perez MRN# [id number]   YOB: 1950 Age: 71 year old   Date of Admission: 12/28/2021     Reason for consult: I was asked by Dr Knapp to evaluate this patient for Acute on chronic hypercapnic respiratory failure yesterday; this is a follow up note. .           Assessment and Plan:     Chronic respiratory failure with hypoxia, on home oxygen therapy (H)    COPD, severe (H)    S/P colostomy (H) - since colonic perforation in 5/2016 due to sigmoid diverticulitis    Pulmonary HTN - probably secondary to COPD per cardiology    Open displaced fracture of proximal phalanx of left ring finger, initial encounter    Pneumonia due to infectious organism    Anemia in other chronic diseases classified elsewhere    * No resolved hospital problems. *     Acute hypercapnic respiratory failure  Encephalopathy due to hypercarbia  End stage copd on 3.5 l o2 at baseline  L 4th finger fx due to trip over o2 tubing  LIZBETH infiltrate ; did not resolve with abx  ; CT discloses several small nodules and extensive bullous lung disease.         Empiric antibiotics  Chest CT scan done today  Steroids, taper slowly to 10 mg per day and stay there pending Pulmonary follow up with her regular Pulmonologist, Dr Mims in our group in a few weeks.  She declines appt in person and will only accept virtual appt; we will attempt to accomodate her.   Titrate fiO2 down and target sats of 88-94% range at rest on low flow O2.   BiPAP with breaks ad ally  Avoid sedatives  Due to bouts of tachycardia, stop LABA and avoid in future. Spiriva, daily prednisone, and prn albuterol nebs would be a good regimen for her in future.  Repeat CXR in 2 weeks to b sure infiltrate clears if not completely cleared at discharge  Est LOS 1d more.  Anticipate transfer to TCU for rehab. She will not go back to [place] and is very upset at care there.  Thank you for asking us to see her, agree with your plan of care  ".  Ruperto    Update 12/29/21: Unusual infiltrate LIZBETH turns out to be a constellation of lung nodules, indeterminate but concerning. They will require follow up. She is not a candidate for biopsy at the moment and will follow up with Dr Mims after repeat imaging following abx therapy this time. She must book appt for late January. Huge bullae and severe emphysema make surgery unlikely if malignant. CT in 3 weeks (after abx).   I discussed this with her and gave her contact in our office to arrange her desired virtual follow up.     Please order a neck to knees body PET scan with CT imaging for late January upon discharge.     Anticipate discharge tomorrow as she is better. She feels ready to go. Discussed with her.     Ruperto             Chief Complaint:   Acute on chronic hypercarbia, confusion, and dyspnea with abnormal CXR    History is obtained from the patient and electronic health record         History of Present Illness:   This patient is a 71 year old female who presents with complaints of poor care at the [facility] was [facility]; she was paranoid and angry at [facility] and had severe hypercarbia on ABG today upon ED evaluation. She has been on biPAP all day today and is feeling much better she says. No new cough or sputum but very unhappy about personal care at [facility].     Her history is outlined in her note today by Dr Knapp:     \"  Addendum: Patient seen again in ED, second visit.  Serial venous blood gases show profound hypercarbia.  She is awake but does not remember meeting me earlier today.  We discussed options for her care, specifically that she may fatigue/become lethargic even though she is on BiPAP and if that happens, she would either need intubation and mechanical ventilation, \"life support\" or emphasis on her comfort, hospice care.  She answered with a number of metaphors, including,\" I am walking the line,\" and, \"He's (God has) brought " "me through the fire.\"     I asked her to be very specific regarding her wishes.  She says, \"I will make it easy for you,\" indicates she has an advance directive.  We do not have her advance directive on file, she says her son, Narciso, has it, he will bring it to the hospital.  She also says her son Narciso is her designated medical decision-maker I told her Narciso says that she should be intubated and on the ventilator \"as a last resort.\"  She did not disagree.     For now, continue BiPAP and other medical cares as outlined below.  If she has a cardiac or respiratory arrest, her current advance directive indicates she is full code.        Assessment & Plan     Carmen Perez is a 71 year old female admitted on 2021. She was recently admitted to [facility] from -2021 for acute exacerbation of severe COPD, also open fracture of the left fourth proximal phalanx, currently at [facility], came to the emergency department with \"agitation\" stating that staff at [facility] were not attentive to her needs.  She called 911.  Here, chest x-ray suggests left upper lobe infiltrate.                     Past Medical History:     Past Medical History:   Diagnosis Date     Asthma     worse in the summer     COPD (chronic obstructive pulmonary disease) (H)     previous smoker     Cor pulmonale (H)      Hyperkalemia 2012     Mild major depression (H)      Myocardial infarction (H)     on furosemide             Past Surgical History:     Past Surgical History:   Procedure Laterality Date      SECTION       HERNIORRHAPHY INCISIONAL (LOCATION) N/A 2016    Procedure: HERNIORRHAPHY INCISIONAL (LOCATION);  Surgeon: Bronson Ornelas MD;  Location:  OR     LAPAROSCOPIC ASSISTED COLECTOMY N/A 2016    Procedure: LAPAROSCOPIC ASSISTED COLECTOMY;  Surgeon: Bronson Ornelas MD;  Location:  OR     LAPAROSCOPIC ASSISTED COLOSTOMY N/A 2016    Procedure: LAPAROSCOPIC ASSISTED COLOSTOMY; "  Surgeon: Bronson Ornelas MD;  Location:  OR     LAPAROSCOPIC TUBAL LIGATION                 Social History:     Social History     Tobacco Use     Smoking status: Former Smoker     Packs/day: 0.25     Types: Cigarettes     Quit date: 2012     Years since quittin.5     Smokeless tobacco: Never Used   Substance Use Topics     Alcohol use: No     Alcohol/week: 0.0 standard drinks             Family History:     Family History   Problem Relation Age of Onset     C.A.D. Mother          in her 60's             Immunizations:     Immunization History   Administered Date(s) Administered     Influenza (High Dose) 3 valent vaccine 2014, 09/15/2015, 2016, 2017, 2019     Influenza (IIV3) PF 2012, 2012, 2013, 2014, 10/01/2015     Influenza Vaccine IM Ages 6-35 Months 4 Valent (PF) 2013     Pneumo Conj 13-V (2010&after) 2016     Pneumococcal 23 valent 2012, 2017     TDAP Vaccine (Adacel) 07/10/2014     Tdap (Adacel,Boostrix) 07/10/2014     Zoster vaccine, live 10/09/2013             Allergies:   No Known Allergies          Medications:     Current Facility-Administered Medications Ordered in Epic   Medication Dose Route Frequency Last Rate Last Admin     acetaminophen (TYLENOL) tablet 650 mg  650 mg Oral Q6H PRN        Or     acetaminophen (TYLENOL) Suppository 650 mg  650 mg Rectal Q6H PRN         acetaminophen (TYLENOL) tablet 1,000 mg  1,000 mg Oral TID         albuterol (PROVENTIL) neb solution 2.5 mg  2.5 mg Nebulization Q6H PRN         carboxymethylcellulose PF (REFRESH PLUS) 0.5 % ophthalmic solution 1 drop  1 drop Both Eyes Q1H PRN         diltiazem ER COATED BEADS (CARDIZEM CD/CARTIA XT) 24 hr capsule 120 mg  120 mg Oral Daily         enoxaparin ANTICOAGULANT (LOVENOX) injection 30 mg  30 mg Subcutaneous Q24H         fluticasone-salmeterol (ADVAIR) 250-50 MCG/DOSE diskus inhaler 1 puff  1 puff Inhalation BID         ipratropium  - albuterol 0.5 mg/2.5 mg/3 mL (DUONEB) neb solution 3 mL  3 mL Nebulization 4x Daily         lidocaine (LMX4) cream   Topical Q1H PRN         lidocaine 1 % 0.1-1 mL  0.1-1 mL Other Q1H PRN         melatonin tablet 1 mg  1 mg Oral At Bedtime PRN         methylPREDNISolone sodium succinate (solu-MEDROL) injection 62.5 mg  62.5 mg Intravenous Q6H         No lozenges or gum should be given while patient on BIPAP/AVAPS/AVAPS AE   Does not apply Continuous PRN         ondansetron (ZOFRAN-ODT) ODT tab 4 mg  4 mg Oral Q6H PRN        Or     ondansetron (ZOFRAN) injection 4 mg  4 mg Intravenous Q6H PRN         Patient may continue current oral medications   Does not apply Continuous PRN         piperacillin-tazobactam (ZOSYN) 4.5 g vial to attach to  mL bag  4.5 g Intravenous Q6H         senna-docusate (SENOKOT-S/PERICOLACE) 8.6-50 MG per tablet 1 tablet  1 tablet Oral BID PRN        Or     senna-docusate (SENOKOT-S/PERICOLACE) 8.6-50 MG per tablet 2 tablet  2 tablet Oral BID PRN         senna-docusate (SENOKOT-S/PERICOLACE) 8.6-50 MG per tablet 1 tablet  1 tablet Oral Daily         sodium chloride (PF) 0.9% PF flush 3 mL  3 mL Intracatheter Q8H         sodium chloride (PF) 0.9% PF flush 3 mL  3 mL Intracatheter q1 min prn         No current [facility]-ordered outpatient medications on file.             Review of Systems:   The 5 point Review of Systems is negative other than noted in the HPI            Physical Exam:     Vitals were reviewed  Patient Vitals for the past 8 hrs:   BP Temp Temp src Pulse Resp SpO2   12/29/21 1256 -- -- -- -- -- 97 %   12/29/21 1230 -- -- -- 114 (!) 33 98 %   12/29/21 1200 -- -- -- 107 (!) 49 98 %   12/29/21 1129 -- 98.6  F (37  C) Oral -- -- --   12/29/21 1000 113/55 -- -- (!) 126 (!) 31 96 %   12/29/21 0945 99/55 -- -- (!) 130 -- (!) 86 %   12/29/21 0736 -- 98.8  F (37.1  C) Oral -- -- --     Alert watching tv upright in bed on FFM biPAP  Pleasant lady nad able to give history nad ox  No  cough   No rhonchi  Speaks full sentences BS decreased bilaterally no wheezes.   No tachypnea  NSR on tele  RRR  -CCE       Data:     Lab Results   Component Value Date    WBC 6.4 12/29/2021    WBC 16.2 (H) 12/28/2021    WBC 14.5 (H) 12/27/2021    HGB 8.7 (L) 12/29/2021    HGB 10.2 (L) 12/28/2021    HGB 10.1 (L) 12/27/2021    HCT 32.9 (L) 12/29/2021    HCT 39.0 12/28/2021    HCT 37.1 12/27/2021     12/29/2021     12/28/2021     12/27/2021     12/29/2021     12/28/2021     12/27/2021    POTASSIUM 5.0 12/29/2021    POTASSIUM 4.3 12/28/2021    POTASSIUM 3.8 12/27/2021    CHLORIDE 98 12/29/2021    CHLORIDE 98 12/28/2021    CHLORIDE 93 (L) 12/27/2021    CO2 41 (H) 12/29/2021    CO2 >45 (HH) 12/28/2021    CO2 >45 (HH) 12/27/2021    BUN 31 (H) 12/29/2021    BUN 17 12/28/2021    BUN 18 12/27/2021    CR 0.60 12/29/2021    CR 0.55 12/28/2021    CR 0.56 12/27/2021     (H) 12/29/2021     (H) 12/29/2021     (H) 12/28/2021    DD <0.2 06/12/2012    NTBNPI 4380 (H) 06/11/2012    TROPI <0.012 06/12/2012    TROPI <0.012 06/12/2012    TROPI 0.737 (HH) 06/11/2012    AST 13 12/28/2021    AST 26 01/11/2020    AST 17 02/04/2019    ALT 27 12/28/2021    ALT 24 01/11/2020    ALT 23 02/04/2019    ALKPHOS 19 (L) 12/28/2021    ALKPHOS 20 (L) 01/11/2020    ALKPHOS 19 (L) 02/04/2019    BILITOTAL 0.2 12/28/2021    BILITOTAL 0.2 01/11/2020    BILITOTAL 0.2 02/04/2019   Results for GEOFF BRUCE (MRN [id number]) as of 12/28/2021 19:13   Ref. Range 12/28/2021 11:03 12/28/2021 12:06 12/28/2021 15:15 12/28/2021 16:49   FIO2 Unknown  4 50 99   Ph Venous Latest Ref Range: 7.32 - 7.43   7.21 (L) 7.24 (L) 7.21 (L)   PCO2 Venous Latest Ref Range: 40 - 50 mm Hg  122 (HH) 111 (HH) 123 (HH)   PO2 Venous Latest Ref Range: 25 - 47 mm Hg  23 (L) 31 15 (L)   Bicarbonate Venous Latest Ref Range: 21 - 28 mmol/L  49 (HH) 48 (HH) 49 (HH)   Base Excess Venous Latest Ref Range: -7.7 - 1.9 mmol/L  17.0 (H) 16.7  (H) 16.7 (H)   Oxyhemoglobin Venous Latest Ref Range: 70 - 75 %  32 (L) 52 (L) 15 (L)   SARS CoV2 PCR Latest Ref Range: Negative  Negative          Narrative & Impression   CHEST PORTABLE ONE VIEW   12/28/2021 10:51 AM      HISTORY: COPD.  Cough.  Question acute pneumonia.      COMPARISON: Chest x-ray 12/16/2021.                                                                      IMPRESSION: Increasing patchy opacities at the left upper lateral lung  may be progression of pneumonia. Emphysematous changes noted  bilaterally along with some interstitial prominence. Normal cardiac  silhouette.      BRI NARVAEZ MD         CT CHEST HI-RESOLUTION WO CONTRAST 12/29/2021 8:38 AM     CLINICAL HISTORY: ex smoker wih LIZBETH infiltrate despite abx; advanced  copd.  TECHNIQUE: High resolution images were obtained through the chest  during inspiration with select expiratory views. Prone imaging was  performed. Multiplanar reformats were obtained. Dose reduction  techniques were used.     CONTRAST: None.     COMPARISON: Chest x-ray 12/28/2021 and chest CT 5/8/2012     FINDINGS:   LUNGS AND PLEURA: Advanced bullous centrilobular emphysema. No  significant air trapping. Mild bronchiectasis and partial collapse of  the right middle lobe. No significant pulmonary fibrosis.  Indeterminate left upper lobe 9 mm spiculated nodule (series 3, image  91) and left lower lobe indeterminate 7 mm nodule. Additional  scattered patchy and nodular opacities, the largest of which is a left  lower lobe basilar 21 x 10 mm patchy opacity (series 3, image 180), a  left lower lobe 12 x 3 mm linear opacity (series 3, image 156), a left  upper lobe 17 x 10 mm patchy opacity (series 2, image 109) and a right  lower lobe 13 x 6 mm linear opacity (series 3, image 199). Trace left  pleural effusion.     MEDIASTINUM/AXILLAE: No lymphadenopathy. Mildly enlarged main  pulmonary artery which can be seen with pulmonary arterial  hypertension, measuring 3.1 cm. No  thoracic aortic aneurysm. Small  hiatal hernia. Mild coronary artery calcifications.     UPPER ABDOMEN: No significant finding.     MUSCULOSKELETAL: Degenerative changes in the spine. No suspicious  lesions in the bones.                                                                      IMPRESSION:   1.  Advanced bullous emphysema. Mild right middle lobe bronchiectasis  with partial atelectasis.  2.  Left upper lobe spiculated 9 mm nodule and left lower lobe  spiculated 7 mm nodule are indeterminate but suspicious for  malignancy. A few other additional scattered patchy and linear  consolidative opacities could represent atelectasis and/or pneumonia,  however malignancy is not entirely excluded. The largest is a 21 x 10  mm opacity in the left lower lobe at the lung bases. Consider follow  up CT or PET-CT in about 3 months.   3.  Trace left pleural effusion.     4.  REFERENCE:  Guidelines for Management of Incidental Pulmonary Nodules Detected on  CT Images: From the Fleischner Society 2017.   Guidelines apply to incidental nodules in patients who are 35 years or  older.  Guidelines do not apply to lung cancer screening, patients with  immunosuppression, or patients with known primary cancer.     MULTIPLE NODULES  Nodule size 6 mm or larger  Low-risk patients: Follow-up CT at 3-6 months, then consider CT at  18-24 months.  High-risk patients: Follow-up CT at 3-6 months, then at 18-24 months  if no change.  -Use most suspicious nodule as guide to management.     Consider referral to lung nodule clinic.     RAISSA MAYA MD         SYSTEM ID:  [id number]       Order-Level Documents:    There are no order-level documents.    Lab and Collection          All imaging studies reviewed by me. Other cuts show a peripheral nodule lower in left post lung and huge severe BUL bullous lung disease .

## 2021-12-29 NOTE — PROGRESS NOTES
"[facility]    Medicine Progress Note - Hospitalist Service       Date of Admission:  12/28/2021    Assessment & Plan              Carmen Perez is a 71 year old female admitted on 12/28/2021. She was recently admitted to [facility] from 12/16-12/19/2021 for acute exacerbation of severe COPD, also open fracture of the left fourth proximal phalanx, currently at [facility], came to the emergency department with \"agitation\" stating that staff at [place] were not attentive to her needs.  She called 911.  Here, chest x-ray suggests left upper lobe infiltrate.    Principal Problem:  Pneumonia due to infectious organism    Checked procalcitonin, it is modestly elevated (0.15)    Treat with broad-spectrum antibiotics, currently on Zosyn    Pulmonary consult requested, I appreciate Dr. Boyce's evaluation and recommendations    Treat with supplemental oxygen,. Per Pulmonary, \"Titrate fiO2 down and target sats of 88-94% range at rest on low flow O2. \"    Added nebulized bronchodilators    Resume parenteral corticosteroids  Active Problems:    COPD, severe (H), with exacerbation    Acute on chronic respiratory failure with hypoxia and hypercarbia,  on home oxygen therapy (H)   Spiculated lung nodules, suspicious for lung cancer    Typically uses 3.5 L of oxygen continuously    On admission, oxygen saturation was 100% on 3 L per nasal cannula    Subsequent venous blood gas showed PCO2 122 mmHg, BiPAP has been applied    Please see results of serial blood gas measurements from 12/-16->12/28/  PCO2 was 114, improved to 71 (at [facility]) with treatment, including bronchodilators and biPAP.  As above, PCO2 yesterday was 122, improved only marginally to 111 with BiPAP.  CO2 on BMP was > 45.  She shows maximal renal compensation for severe respiratory acidosis, but not sustainable over the long term.      Resume parenteral corticosteroids, nebulized bronchodilators    Also per Pulmonary, \"Due to bouts " "of tachycardia, stop LABA and avoid in future. Spiriva, daily prednisone, and prn albuterol nebs would be a good regimen for her in future.\"    Chest CT shows advanced bullous emphysema and spiculated lung nodule in the left lower lobe and left lower lobe indeterminate but suspicious for malignancy    I discussed the chest CT findings with patient and her son, Narciso.  They are aware that the nodules could be cancer.  She does not want biopsy because she would not want cancer treatment under any circumstances    Wean BiPAP as able  Acute encephalopathy, likely due to severe hypercarbia    See remarks from EMS run sheet, some of patient's remarks suggest paranoia or psychosis.  Note that she insisted on bringing her belongings with her to her chest CT today    Treat hypercarbia and/or infection    Son, Narciso, is at the bedside today and acknowledges her profound confusion and paranoia.  She made multiple remarks about \"those Latvian's,\" does not want them in her house, [facility].  He says her worsening confusion and paranoia were beginning even during the last hospital stay and worsened at [facility].  He further notes that she sent him multiple text messages during her emergency department stay, messages were bizarre and nonsensical    Could also be related to medications, including corticosteroids  Paroxysmal SVT    At outside hospital, patient had Intermittent tachycardia with heart rate around 130, other times in NSR with normal rates.  \"Telemetry and EKG difficult to assess whether this is A. fib or other SVT, but given the tachycardia is very regular and marches out on EKG more likely SVT other than A. Fib.\"    Started on oral diltiazem here and heart rate improved when she is in SVT.    Continue diltiazem extended release 120 mg daily    Not definitively A. fib and patient would like to minimize pill burden so did not start anticoagulation.\"    Recheck EKG    See above, re: discontinuing LABA    Pulmonary " HTN , group 3    Noted    Open displaced fracture of proximal phalanx of left ring finger, initial encounter    Carmen tripped at home on her oxygen tubing    She was seen by orthopedics at [facility], they applied a cast, recommended a course of oral Keflex and outpatient follow-up with Ortho  S/P colostomy (H) - since colonic perforation in 5/2016 due to sigmoid diverticulitis    Noted    WOC consult for routine ostomy cares   Anemia in other chronic diseases classified elsewhere    Recheck CBC     Goals of care:   Again discussed CODE STATUS and goals of care with sonNarciso, at the bedside.  He indicates she desires DNR/DNI, she agrees.    We discussed that recent blood gases show progressive hypercarbia which may recur if she is off BiPAP for any length of time.  They would like to continue routine medical cares including BiPAP, nebulized bronchodilators, antibiotics and see if she responds to this.  If not they would consider changing emphasis to her comfort and involving palliative care.       Diet: Regular Diet Adult    DVT Prophylaxis: Enoxaparin (Lovenox) SQ  Quinn Catheter: Not present  Central Lines: None  Code Status: Full Code      Disposition Plan   Expected Discharge: several days  Anticipated discharge location:  Awaiting care coordination huddle  Delays:     medical stability, placement     Total time spent:  > 35 minutes  Time spent counseling, coordination of care: 25 minutes including discussion with care team and son, Narciso, regarding acute on chronic hypercarbic respiratory failure, pneumonia, COPD, lung nodules, advanced directive and goals of care, also, personal review and interpretation of labs and studies as noted above     Patience Knapp MD  Hospitalist Service  [facility]  Securely message with the Vocera Web Console (learn more here)  Text page via [employer] Paging/Directory        Clinically Significant Risk Factors Present on Admission                 "    ______________________________________________________________________    Interval History   \"I don't know you.\"  Patient did not remember meeting me twice yesterday, we had to lengthy conversations on 12/28.  She talked virtually nonstop and made a number of nonsensical remarks, including, \"maybe I can get off oxygen altogether.\"  (Son reminded her she has needed continuous oxygen for years.)  She repeated her request for a , her voice became louder and she said, \"heal her.\"  She then began talking about \"those Iraqi's, I do not want them in my house.\"  (This is apparently a reference to her home health aide.)    Son, Narciso, is at the bedside and we reviewed her chest CT findings, including the presence of 2 spiculated nodules which could represent lung malignancy.  Patient does not want biopsy or any further work-up because she would not want cancer treatment under any circumstance.    Data reviewed today: I reviewed all medications, new labs and imaging results over the last 24 hours. I personally reviewed the chest CT image(s) showing Spiculated nodules, advanced bullous emphysema.    Physical Exam   Vital Signs: Temp: 98.8  F (37.1  C) Temp src: Oral BP: 100/60 Pulse: 102   Resp: 17 SpO2: 99 % O2 Device: Nasal cannula Oxygen Delivery: 3 LPM  Weight: 0 lbs 0 oz  Constitutional: Awake, alert.  Wearing BiPAP.  BiPAP machine alarms if she talks extensively  Respiratory: Decreased breath sounds throughout.  She is tachypneic, with increased work of breathing  Cardiovascular: Tachycardic, regular rate and rhythm  GI: Normal bowel sounds, soft, ostomy appliance with soft brown effluent  Skin/Integumen: No rash on exposed skin  Other: Mood is very activated, almost manic, with comments that convey paranoia and or suspicion      Data   Recent Labs   Lab 12/29/21  0725 12/29/21  0544 12/28/21  2131 12/28/21  1102 12/27/21  0850   WBC  --  6.4  --  16.2* 14.5*   HGB  --  8.7*  --  10.2* 10.1*   MCV  --  104*  " --  105* 101*   PLT  --  268  --  286 317   NA  --  140  --  142 139   POTASSIUM  --  5.0  --  4.3 3.8   CHLORIDE  --  98  --  98 93*   CO2  --  41*  --  >45* >45*   BUN  --  31*  --  17 18   CR  --  0.60  --  0.55 0.56   ANIONGAP  --  1*  --  <1* <1*   OLI  --  8.9  --  8.6 9.5   * 112* 118* 98 76   ALBUMIN  --   --   --  3.1*  --    PROTTOTAL  --   --   --  6.5*  --    BILITOTAL  --   --   --  0.2  --    ALKPHOS  --   --   --  19*  --    ALT  --   --   --  27  --    AST  --   --   --  13  --      Recent Results (from the past 24 hour(s))   XR Chest Port 1 View    Narrative    CHEST PORTABLE ONE VIEW   12/28/2021 10:51 AM     HISTORY: COPD.  Cough.  Question acute pneumonia.     COMPARISON: Chest x-ray 12/16/2021.      Impression    IMPRESSION: Increasing patchy opacities at the left upper lateral lung  may be progression of pneumonia. Emphysematous changes noted  bilaterally along with some interstitial prominence. Normal cardiac  silhouette.     BRI NARVAEZ MD         SYSTEM ID:  [id number]     Medications     - MEDICATION INSTRUCTIONS -       - MEDICATION INSTRUCTIONS -         acetaminophen  1,000 mg Oral TID     diltiazem ER COATED BEADS  120 mg Oral Daily     enoxaparin ANTICOAGULANT  30 mg Subcutaneous Q24H     ipratropium - albuterol 0.5 mg/2.5 mg/3 mL  3 mL Nebulization 4x Daily     methylPREDNISolone  62.5 mg Intravenous Q6H     piperacillin-tazobactam  3.375 g Intravenous Q6H     senna-docusate  1 tablet Oral Daily     sodium chloride (PF)  3 mL Intracatheter Q8H     sodium chloride (PF)  3 mL Intracatheter Q8H     umeclidinium  1 puff Inhalation Daily
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
## 2021-12-30 NOTE — PLAN OF CARE
"Assumed care [id number]. Alert to self, time, & location. Unable to understand situation at times. CT this AM performed. See Pulmonology note. Up w/ Ax1-2 w/ staff, worked with PT, recommending TCU. Cognition fluctuates, hostile and suspicious of staff at times, agreeable at other times. Does state she \"feels crazy because of my gases being off.\" ABG redraw did show improvement compared to yesterday. Tele tachycardia w/ 9 beats of SVT once. Incontinent of bowel & bladder at times, needs frequent cuing from staff to participate in cares. WOC consult placed for colostomy. CMS intact on Left arm, cast present & WDL. Bruised skin scattered. IV SL, Abx given. Need new IV, passed on to NOC RN.   "

## 2021-12-30 NOTE — PROGRESS NOTES
SPIRITUAL HEALTH SERVICES  SPIRITUAL ASSESSMENT Progress Note  [facility]     REFERRAL SOURCE: Admission Request    Reviewed documentation. Reflective conversation shared with Carmen which integrated elements of illness and family narratives.     Carmen shared regarding her stress and grief related to recent health events including having COPD, a recent injury to her left hand, and a recent diagnosis of lung cancer.  She named her Mandaen jose angel as a central source of support. She was raised [employer], but now has a broader understanding of her jose angel and the welcome of God.  She shared that she has the support of her family, in particular naming her oldest sister who lives in [place], along with her son and granddaughters who live in [place].    I offered spiritual and emotional support through reflective listening that affirmed emotions, experience, and meaning. Offered assurance through bible readings from Georgette 4 and Glenn 27, as well as prayer which incorporated conversational themes.    PLAN: Will follow up in 3-5 days. [facility] remains available for support.    Anaya Hawley  Associate   Pager [phone]  Phone [phone]

## 2021-12-30 NOTE — PROGRESS NOTES
"[facility]    Medicine Progress Note - Hospitalist Service       Date of Admission:  12/28/2021    Assessment & Plan      Carmen Perez is a 71 year old female admitted on 12/28/2021. She was recently admitted to [facility] from 12/16-12/19/2021 for acute exacerbation of severe COPD, also open fracture of the left fourth proximal phalanx, currently at [facility], came to the emergency department with \"agitation\" stating that staff at [facility] were not attentive to her needs.  She called 911.  Here, chest x-ray suggests left upper lobe infiltrate.    Principal Problem:  Pneumonia due to infectious organism  Sepsis    Checked procalcitonin, it is modestly elevated (0.15)    Sepsis due to pneumonia, resolving: Diagnosis based on HR > 90 bpm, RR > 20 breaths/min and WBC > 12 due to infection.      Treat with broad-spectrum antibiotics, currently on Zosyn    Pulmonary consult requested, I appreciate Dr. Boyce's evaluation and recommendations    Treat with supplemental oxygen,. Per Pulmonary, \"Titrate fiO2 down and target sats of 88-94% range at rest on low flow O2. \"    Added nebulized bronchodilators    Change to oral steroid, taper as quickly as possible/practical due to psychosis    Active Problems:    COPD, severe (H), with exacerbation    Acute on chronic respiratory failure with hypoxia and hypercarbia,  on home oxygen therapy (H)   Spiculated lung nodules, suspicious for lung cancer    Typically uses 3.5 L of oxygen continuously    On admission, oxygen saturation was 100% on 3 L per nasal cannula    Subsequent venous blood gas showed PCO2 122 mmHg, BiPAP was applied    Please see results of serial blood gas measurements from 12/-16->12/28/  PCO2 was 114, improved to 71 (at [facility]) with treatment, including bronchodilators and biPAP.  As above, PCO2 yesterday was 122, improved only marginally to 111 with BiPAP.  CO2 on BMP was > 45.  She shows maximal renal compensation for " "severe respiratory acidosis, but not sustainable over the long term.      Resumed corticosteroids, nebulized bronchodilators    Also per Pulmonary, \"Due to bouts of tachycardia, stop LABA and avoid in future. Spiriva, daily prednisone, and prn albuterol nebs would be a good regimen for her in future.\"    Chest CT shows advanced bullous emphysema and spiculated lung nodule in the left lower lobe and left lower lobe indeterminate but suspicious for malignancy    I discussed the chest CT findings with patient and her son, Narciso.  They are aware that the nodules could be cancer.  She does not want biopsy because she would not want cancer treatment under any circumstances    Wean BiPAP as able, but hypercarbia will likely return any time she is off NIVV.  See goals of care discussion, below.    Acute encephalopathy, likely due to severe hypercarbia    See remarks from EMS run sheet, some of patient's remarks suggest paranoia or psychosis.  Note that she insisted on bringing her belongings with her to her chest CT today    Treat hypercarbia and/or infection    Son, Narciso, is at the bedside today and acknowledges her profound confusion and paranoia.  She made multiple remarks about \"those Chinese's,\" does not want them in her house.\".  Son says her worsening confusion and paranoia were beginning even during the last hospital stay and worsened at [facility].  He further notes that she sent him multiple text messages during her emergency department stay, messages were bizarre and nonsensical    Psychosis/paranoia also be related to medications, including corticosteroids.  Change to oral steroids, use lowest effective dose    Paroxysmal SVT    At outside hospital, patient had Intermittent tachycardia with heart rate around 130, other times in NSR with normal rates.  \"Telemetry and EKG difficult to assess whether this is A. fib or other SVT, but given the tachycardia is very regular and marches out on EKG more likely " "SVT other than A. Fib.\"    Started on oral diltiazem here and heart rate improved when she is in SVT.    Continue diltiazem extended release 120 mg daily    Not definitively A. fib and patient would like to minimize pill burden so did not start anticoagulation.\"    Recheck EKG    See above, re: discontinuing LABA      Pulmonary HTN , group 3    Noted      S/P colostomy -since colonic perforation in 5/2016 due to sigmoid diverticulitis    Noted    [facility] consult for routine ostomy cares      Open displaced fracture of proximal phalanx of left ring finger, initial encounter    Carmen tripped at home on her oxygen tubing    She was seen by orthopedics at [facility], they applied a cast, recommended a course of oral Keflex and outpatient follow-up with Ortho     Anemia in other chronic diseases classified elsewhere    Hemoglobin is stable    Goals of care:   Again discussed CODE STATUS and goals of care with son, Narciso, at the bedside.  He indicates she desires DNR/DNI, she agrees.    We discussed that recent blood gases show progressive hypercarbia which may recur if she is off BiPAP for any length of time.  They would like to continue routine medical cares including BiPAP, nebulized bronchodilators, antibiotics and see if she responds to this.  If not, they would consider changing emphasis to her comfort and involving palliative care.    Total time spent:  > 35 minutes  Time spent counseling, coordination of care: 25 minutes including discussion with care team and son, Narciso, regarding psychosis and antipsychotic medication, dyspnea and respiratory support, also personal review and interpretation of labs and studies as noted above      Diet: Regular Diet Adult    DVT Prophylaxis: Enoxaparin (Lovenox) SQ  Quinn Catheter: Not present  Central Lines: None  Code Status: No CPR- Do NOT Intubate      Disposition Plan   Expected Discharge: several days  Anticipated discharge location:  Awaiting care coordination " "huddle  Delays:     improved mental status    Patience Knapp MD  Hospitalist Service  [facility]  Securely message with the [employer] Web Console (learn more here)  Text page via [employer] Paging/Directory      Clinically Significant Risk Factors Present on Admission                  ______________________________________________________________________    Interval History   \"Rats.  Rats on a sinking ship.\"  Patient is very upset, orders MD and RT out of her room.  Will not allow RT to draw ABG or apply biPAP mask.  Wants to go to [facility], \"I have brought bad energy here. You need me out of here.\"  Wants to see Psychiatry today, will not accept the possibility of delay.  Cannot obtain meaningful review of systems from patient due to her agitation.    Data reviewed today: I reviewed all medications, new labs and imaging results over the last 24 hours. I personally reviewed the chest CT image(s) showing Spiculated nodules, advanced bullous emphysema.    Physical Exam   Vital Signs: Temp: 97.1  F (36.2  C) Temp src: Axillary BP: 122/70 Pulse: 103   Resp: 27 SpO2: 98 % O2 Device: Nasal cannula with humidification Oxygen Delivery: 4 LPM  Weight: 132 lbs 4.42 oz  Constitutional: Awake, alert.    Respiratory: Decreased breath sounds throughout.    Cardiovascular: Tachycardic, regular rate and rhythm  GI: Normal bowel sounds, soft, ostomy appliance with soft brown effluent  Skin/Integumen: No rash on exposed skin  Other: Mood is very upset, most remarks are bizarre      Data   Recent Labs   Lab 12/30/21  0558 12/29/21  0725 12/29/21  0544 12/28/21  2131 12/28/21  1102   WBC 14.2*  --  6.4  --  16.2*   HGB 9.1*  --  8.7*  --  10.2*   *  --  104*  --  105*     --  268  --  286     --  140  --  142   POTASSIUM 4.9  --  5.0  --  4.3   CHLORIDE 100  --  98  --  98   CO2 >45*  --  41*  --  >45*   BUN 38*  --  31*  --  17   CR 0.60  --  0.60  --  0.55   ANIONGAP <1*  --  1* "  --  <1*   OLI 9.3  --  8.9  --  8.6   * 100* 112*   < > 98   ALBUMIN  --   --   --   --  3.1*   PROTTOTAL  --   --   --   --  6.5*   BILITOTAL  --   --   --   --  0.2   ALKPHOS  --   --   --   --  19*   ALT  --   --   --   --  27   AST  --   --   --   --  13    < > = values in this interval not displayed.     No results found for this or any previous visit (from the past 24 hour(s)).  Medications     - MEDICATION INSTRUCTIONS -       - MEDICATION INSTRUCTIONS -         acetaminophen  1,000 mg Oral TID     diltiazem ER COATED BEADS  120 mg Oral Daily     enoxaparin ANTICOAGULANT  30 mg Subcutaneous Q24H     ipratropium - albuterol 0.5 mg/2.5 mg/3 mL  3 mL Nebulization 4x Daily     methylPREDNISolone  62.5 mg Intravenous Q6H     piperacillin-tazobactam  3.375 g Intravenous Q6H     senna-docusate  1 tablet Oral Daily     sodium chloride (PF)  3 mL Intracatheter Q8H     sodium chloride (PF)  3 mL Intracatheter Q8H     umeclidinium  1 puff Inhalation Daily

## 2021-12-30 NOTE — PROGRESS NOTES
"Antimicrobial Stewardship Team Note    Antimicrobial Stewardship Program - A joint venture between [facility] and [facility] Consult[facility] [employer] to optimize antibiotic management.     Patient: Carmen Perez  MRN: [id number]  Allergies: Patient has no known allergies.    Brief Summary: Carmen Perez is a 71 year old female admitted on 12/28/2021. She was recently admitted to [facility] from 12/16-12/19/2021 for acute exacerbation of severe COPD, also open fracture of the left fourth proximal phalanx, [facility], came to the emergency department with \"agitation\" stating that staff at [facility] were not attentive to her needs.  She called 911.  Here, Pulmonology was consulted and chest x-ray suggests left upper lobe infiltrate that did not resolve with antibiotics. CT also showed several small nodules and extensive bullous lung disease. Findings are suspicious for malignancy.     On 12/28, patient was started on Zosyn for possible hospital acquired pneumonia.         Active Anti-infective Medications   (From admission, onward)                 Start     Stop    12/29/21 1200  piperacillin-tazobactam  3.375 g,   Intravenous,   EVERY 6 HOURS        Hospital-Acquired Pneumonia        01/04/22 6649                  Assessment: Today, the patient remains afebrile, WBC is elevated at 14.2 (receiving Solu-Medrol), and procalcitonin from 12/28 was 0.15. Differential includes possible malignancy and/or bacterial infection. Reasonable to continue Zosyn for possible hospital acquired pneumonia. Would continue Zosyn for 5 day course (through 1/1)    Recommendations:  Other Recommendation(s): define duration of therapy - Zosyn 5 day course (through 1/1)    Pharmacy took the following actions: Electronic note created,Sticky note reminder created.    Discussed with ID Staff Dr. Jarret CARROLL, PharmD PGY1 Resident and Marilee SchneiderD BCPS    Vital Signs/Clinical Features:  Vitals  Report "        12/28 0700  12/29 0659 12/29 0700  12/30 0659 12/30 0700  12/30 1120   Most Recent      Temp ( F) 97.5 -  98.6    98.3 -  98.9      97.1     97.1 (36.2) 12/30 0724    Pulse 96 -  111    58 -  130    98 -  114     114 12/30 1000    Resp 15 -  30    18 -  49    26 -  31     31 12/30 1000    /60 -  131/72    85/45 -  132/65    120/67 -  147/103     147/103 12/30 1000    SpO2 (%) 85 -  100    86 -  100    90 -  100     90 12/30 1000            Labs  Estimated Creatinine Clearance: 79.1 mL/min (based on SCr of 0.6 mg/dL).  Recent Labs   Lab Test 12/18/21  0659 12/19/21  0612 12/27/21  0850 12/28/21  1102 12/29/21  0544 12/30/21  0558   CR 0.53 0.59 0.56 0.55 0.60 0.60       Recent Labs   Lab Test 05/17/16  2341 05/18/16  2227 05/27/16  0807 05/29/16  0836 04/21/19  1151 01/11/20  1155 12/16/21  0937 12/19/21  0612 12/27/21  0850 12/28/21  1102 12/29/21  0544 12/30/21  0558   WBC 2.3*   < > 16.1*   < > 9.7 7.4 10.9  --  14.5* 16.2* 6.4 14.2*   ANEU 1.9  --  14.0*  --  8.1 5.8  --   --   --   --   --   --    ALYM 0.2*  --  0.5*  --  0.7* 0.9  --   --   --   --   --   --    DENA 0.2  --  1.3  --  0.7 0.5  --   --   --   --   --   --    AEOS 0.0  --  0.2  --  0.2 0.2  --   --   --   --   --   --    HGB 11.2*   < > 8.3*   < > 10.8* 11.1* 10.8*  --  10.1* 10.2* 8.7* 9.1*   HCT 35.0   < > 27.8*   < > 36.9 38.0 39.3  --  37.1 39.0 32.9* 33.7*   MCV 84   < > 86   < > 89 89 101*  --  101* 105* 104* 103*      < > 630*   < > 295 304 371   < > 317 286 268 290    < > = values in this interval not displayed.       Recent Labs   Lab Test 05/17/16  2341 07/11/16  1415 10/02/17  1234 02/04/19  1725 01/11/20  1155 12/28/21  1102   BILITOTAL 0.2 0.1* 0.3 0.2 0.2 0.2   ALKPHOS 27* 27* 19* 19* 20* 19*   ALBUMIN 3.2* 3.7 4.1 4.1 4.0 3.1*   AST 10 10 13 17 26 13   ALT 17 22 22 23 24 27       Recent Labs   Lab Test 05/25/16  1540 05/26/16  1944 05/27/16  2130 05/28/16  2235 05/29/16  2240 12/16/21  1447 12/28/21  1102   PCAL   --   --   --   --   --   --  0.15*   LACT 1.5 0.8 1.0 0.8 1.1 0.6  --              Culture Results:  7-Day Micro Results       Procedure Component Value Units Date/Time    Gram stain     Order Status: Sent Lab Status: No result     Specimen: Sputum     Respiratory Aerobic Bacterial Culture     Order Status: Sent Lab Status: No result     Specimen: Sputum from Oropharynx     Quantiferon TB Gold Plus Grey Tube [[id number]] Collected: 12/23/21 1437    Order Status: Completed Lab Status: Final result Updated: 12/24/21 1754    Specimen: Blood from Other      Quantiferon Nil Tube 0.00 IU/mL     Quantiferon TB Gold Plus Green Tube [[id number]] Collected: 12/23/21 1437    Order Status: Completed Lab Status: Final result Updated: 12/24/21 1754    Specimen: Blood from Other      Quantiferon TB1 Tube 0.00 IU/mL     Quantiferon TB Gold Plus Yellow Tube [[id number]] Collected: 12/23/21 1437    Order Status: Completed Lab Status: Final result Updated: 12/24/21 1753    Specimen: Blood from Other      Quantiferon TB2 Tube 0.00    Quantiferon TB Gold Plus Purple Tube [[id number]] Collected: 12/23/21 1437    Order Status: Completed Lab Status: Final result Updated: 12/24/21 1753    Specimen: Blood from Other      Quantiferon Mitogen 0.08 IU/mL     Quantiferon TB Gold Plus [[id number]]  (Abnormal) Collected: 12/23/21 1437    Order Status: Completed Lab Status: Final result Updated: 12/26/21 1118    Specimen: Blood from Other      Quantiferon-TB Gold Plus Indeterminate     Comment: Unable to evaluate interferon gamma response due to a low   mitogen value, which may be the result of insufficient lymphocytes, reduced   lymphocyte activity due to improper specimen handling, or inability of the   patient's lymphocytes to generate interferon gamma.        TB1 Ag minus Nil Value 0.00 IU/mL      TB2 Ag minus Nil Value 0.00 IU/mL      Mitogen minus Nil Result 0.08 IU/mL      Nil Result 0.00 IU/mL             Recent Labs   Lab Test  05/18/16  0120 05/21/16  1830 05/25/16  1400 01/11/20  1306 12/16/21  1042   URINEPH 6.5 6.5 6.5 8.0* 6.0   NITRITE Negative Negative Negative Negative Negative   LEUKEST Negative Negative Negative Moderate* Negative   WBCU 2 1 <1 6* <1                         Imaging: XR Chest Port 1 View    Result Date: 12/28/2021  CHEST PORTABLE ONE VIEW   12/28/2021 10:51 AM HISTORY: COPD.  Cough.  Question acute pneumonia. COMPARISON: Chest x-ray 12/16/2021.     IMPRESSION: Increasing patchy opacities at the left upper lateral lung may be progression of pneumonia. Emphysematous changes noted bilaterally along with some interstitial prominence. Normal cardiac silhouette. BRI NARVAEZ MD   SYSTEM ID:  [id number]    CT Chest Hi-Resolution wo Contrast    Result Date: 12/29/2021  CT CHEST HI-RESOLUTION WO CONTRAST 12/29/2021 8:38 AM CLINICAL HISTORY: ex smoker wih LIZBETH infiltrate despite abx; advanced copd. TECHNIQUE: High resolution images were obtained through the chest during inspiration with select expiratory views. Prone imaging was performed. Multiplanar reformats were obtained. Dose reduction techniques were used. CONTRAST: None. COMPARISON: Chest x-ray 12/28/2021 and chest CT 5/8/2012 FINDINGS: LUNGS AND PLEURA: Advanced bullous centrilobular emphysema. No significant air trapping. Mild bronchiectasis and partial collapse of the right middle lobe. No significant pulmonary fibrosis. Indeterminate left upper lobe 9 mm spiculated nodule (series 3, image 91) and left lower lobe indeterminate 7 mm nodule. Additional scattered patchy and nodular opacities, the largest of which is a left lower lobe basilar 21 x 10 mm patchy opacity (series 3, image 180), a left lower lobe 12 x 3 mm linear opacity (series 3, image 156), a left upper lobe 17 x 10 mm patchy opacity (series 2, image 109) and a right lower lobe 13 x 6 mm linear opacity (series 3, image 199). Trace left pleural effusion. MEDIASTINUM/AXILLAE: No lymphadenopathy. Mildly  enlarged main pulmonary artery which can be seen with pulmonary arterial hypertension, measuring 3.1 cm. No thoracic aortic aneurysm. Small hiatal hernia. Mild coronary artery calcifications. UPPER ABDOMEN: No significant finding. MUSCULOSKELETAL: Degenerative changes in the spine. No suspicious lesions in the bones.     IMPRESSION: 1.  Advanced bullous emphysema. Mild right middle lobe bronchiectasis with partial atelectasis. 2.  Left upper lobe spiculated 9 mm nodule and left lower lobe spiculated 7 mm nodule are indeterminate but suspicious for malignancy. A few other additional scattered patchy and linear consolidative opacities could represent atelectasis and/or pneumonia, however malignancy is not entirely excluded. The largest is a 21 x 10 mm opacity in the left lower lobe at the lung bases. Consider follow up CT or PET-CT in about 3 months. 3.  Trace left pleural effusion. 4.  REFERENCE: Guidelines for Management of Incidental Pulmonary Nodules Detected on CT Images: From the Fleischner Society 2017. Guidelines apply to incidental nodules in patients who are 35 years or older. Guidelines do not apply to lung cancer screening, patients with immunosuppression, or patients with known primary cancer. MULTIPLE NODULES Nodule size 6 mm or larger Low-risk patients: Follow-up CT at 3-6 months, then consider CT at 18-24 months. High-risk patients: Follow-up CT at 3-6 months, then at 18-24 months if no change. -Use most suspicious nodule as guide to management. Consider referral to lung nodule clinic. RAISSA MAYA MD   SYSTEM ID:  [id number]

## 2021-12-30 NOTE — PLAN OF CARE
Pt disoriented to situation. VSS on 3.5L O2. Tele NSR. Lung sounds diminished. Dyspneic on exertion. Incontinent of bladder, purewick in place. Colostomy w/ small amount of stool in bag, pt refused to have it emptied. Tolerating regular diet. Up w/ 1 and walker.

## 2021-12-30 NOTE — CONSULTS
"[facility]  WOC Nurse Inpatient Ostomy Assessment     Assessment of established end Colostomy     Surgery date:  5-18-16  Surgeon:  Ceci  Procedure:  LAPAROSCOPIC converted to laparotomy, sigmoid colon resection with end colostomy and Chris pouch  Related diagnosis:  sigmoid colon diverticulitis with evidence of perforation    WOC Assessment & Physical Exam:        Current status: resting in bed, using BIPAP, expresses frustration over wearing bipap, intermittent confusion and sleeps during consult       Date of last photo: 12-30-21          Stoma location: Q    Stoma size: 1 3/8\" (35mm)    Stoma appearance: viable, healthy, normal-appearing and red    Mucocutaneous junction:  Intact     Peristomal complication(s): none     Abdominal assessment: soft      Current pouching system: wearing a convatec flat pouch with barrier ring     Pouch last changed:  Unknown     Reason for pouch change today: routine schedule      WOC Plan:        Colostomy; LLQ Care  EFFECTIVE NOW        Comments: ~ pouch changed 12-30 by WOC, primary RN to provide routine pouch changes (patient ind at home)   ~ Encourage patient participation with ostomy care,   ~ Empty pouch when 1/3 to 1/2 full,   ~ Notify WOC for ongoing ostomy pouch leakage,   ~ Document stoma output volume, color, consistency EVERY shift   ~ Supplies used   ~ Pouch: [place] 57 FECAL ([id number])   ~ Flange/Barrier/Wafer: Carmelita 57mm FLAT ([id number])   ~ Accessories: 2\" Adapt Barrier Ring ([id number])   Question Answer Comment   Type of Ostomy Colostomy    Location LLQ    Pouch Change Frequency Monday    Pouch Change Frequency Thursday    Pouch changed by Staff RN    Pouch emptied by Patient to empty with assist    Straight Drainage No                Objective Data:       Patient history according to medical record: \"71 year old female admitted on 12/28/2021. She was recently admitted to [facility] from 12/16-12/19/2021 for acute " "exacerbation of severe COPD, also open fracture of the left fourth proximal phalanx, currently at [facility], came to the emergency department with \"agitation\" stating that staff at [facility] were not attentive to her needs.  She called 911.  Here, chest x-ray suggests left upper lobe infiltrate.\"      Current Diet/Nutrition:   Orders Placed This Encounter      Regular Diet Adult       Output:  I/O last 3 completed shifts:  In: 270 [P.O.:270]  Out: 350 [Urine:300; Stool:50]      Labs:   Recent Labs   Lab 12/30/21  0558 12/29/21  0544 12/28/21  1102   ALBUMIN  --   --  3.1*   HGB 9.1*   < > 10.2*   WBC 14.2*   < > 16.2*    < > = values in this interval not displayed.         Adam Risk Assessment:   Sensory Perception: 3-->slightly limited  Moisture: 3-->occasionally moist  Activity: 3-->walks occasionally  Mobility: 3-->slightly limited  Nutrition: 3-->adequate  Friction and Shear: 2-->potential problem  Adam Score: 17      WOC Interventions:       Patient's chart evaluated    Focus of today's visit: pouch change, peristomal skin assessment      Pouch changed yes    Participant(s) in teaching session today: patient     Change made with ostomy management today: Yes, provided sub supplies different from home routine     Supplies: at bedside    Preparation for discharge: patient has ostomy supplies at home     Discussed plan of care with: Patient and Nurse    Merlyn JULES        "

## 2021-12-31 NOTE — PROGRESS NOTES
"Pt orientation difficult to assess, sometimes seems to understand what is going on, however, diverts to different subjects quickly or quickly gets frustrated.    Per report from NOC RN pt \"likes to tell people she is going to knife them\", she told HEATHER Laguerre that she likes to see people's reactions when she says it.    This morning, writer checked on patient and CNA was helping her get washed up. Carmen was appreciative of it, then when the CNA washed her back Carmen exclaimed \"You are so rough!\"  CNA apologized and said she did not mean to be rough.   Carmen replied \"You are rough! I am keeping you, I won't knife you! I am going to look amazing after this!\"  "

## 2021-12-31 NOTE — PROGRESS NOTES
"[facility]    Medicine Progress Note - Hospitalist Service       Date of Admission:  12/28/2021    Assessment & Plan      Carmen Perez is a 71 year old female admitted on 12/28/2021. She was recently admitted to [facility] from 12/16-12/19/2021 for acute exacerbation of severe COPD, also open fracture of the left fourth proximal phalanx, currently at [facility], came to the emergency department with \"agitation\" stating that staff at [facility] were not attentive to her needs.  She called 911.  Here, chest x-ray suggests left upper lobe infiltrate.    Principal Problem:  Pneumonia due to infectious organism  Sepsis    Checked procalcitonin, it is modestly elevated (0.15)    Sepsis due to pneumonia, resolving: Diagnosis based on HR > 90 bpm, RR > 20 breaths/min and WBC > 12 due to infection.      Treat with broad-spectrum antibiotics, currently on Zosyn    Pulmonary consult requested, I appreciate Dr. Boyce's evaluation and recommendations    Treat with supplemental oxygen,. Per Pulmonary, \"Titrate fiO2 down and target sats of 88-94% range at rest on low flow O2. \"    Added nebulized bronchodilators    Change to oral steroid, taper as quickly as possible/practical due to psychosis    Discussed with Dr. Boyce, she recommends Medrol 4 mg oral daily    Active Problems:    COPD, severe (H), with exacerbation    Acute on chronic respiratory failure with hypoxia and hypercarbia,  on home oxygen therapy (H)   Spiculated lung nodules, suspicious for lung cancer    Typically uses 3.5 L of oxygen continuously    On admission, oxygen saturation was 100% on 3 L per nasal cannula    Subsequent venous blood gas showed PCO2 122 mmHg, BiPAP was applied    Please see results of serial blood gas measurements from 12/-16->12/28/  PCO2 was 114, improved to 71 (at [facility]) with treatment, including bronchodilators and biPAP.  As above, PCO2 yesterday was 122, improved only marginally to 111 with BiPAP.  " "CO2 on BMP was > 45.  She shows maximal renal compensation for severe respiratory acidosis, but not sustainable over the long term.      Resumed corticosteroids, nebulized bronchodilators    Also per Pulmonary, \"Due to bouts of tachycardia, stop LABA and avoid in future. Spiriva, daily prednisone, and prn albuterol nebs would be a good regimen for her in future.\"    Chest CT shows advanced bullous emphysema and spiculated lung nodule in the left lower lobe and left lower lobe indeterminate but suspicious for malignancy    I discussed the chest CT findings with patient and her son, Narciso.  They are aware that the nodules could be cancer.  She does not want biopsy because she would not want cancer treatment under any circumstances    Wean BiPAP as able, but hypercarbia will likely return any time she is off NIVV.  See goals of care discussion, below.    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  Note that she insisted on bringing her belongings with her to her chest CT today    Treat hypercarbia and/or infection    Son, Narciso, has been at the bedside today and acknowledges her profound confusion and paranoia. Son says her worsening confusion and paranoia were beginning even during the last hospital stay and worsened at [facility] [place].  He further notes that she sent him multiple text messages during her emergency department stay, messages were bizarre and nonsensical    Psychosis/paranoia also be related to medications, including corticosteroids.  Changed to oral steroids, use lowest effective dose    Straughn makes bizarre statements such as, \"I am going to knife you.\"  She also refuses essential cares, including biPAP and labs.  We have been giving anti-psychotics as needed for her safety, safety of staff    Psychiatry consult again requested to " "assist with medication management     Paroxysmal SVT    At outside hospital, patient had Intermittent tachycardia with heart rate around 130, other times in NSR with normal rates.  \"Telemetry and EKG difficult to assess whether this is A. fib or other SVT, but given the tachycardia is very regular and marches out on EKG more likely SVT other than A. Fib.\"    Started on oral diltiazem here and heart rate improved when she is in SVT.    Continue diltiazem extended release 120 mg daily    Not definitively A. fib and patient would like to minimize pill burden so did not start anticoagulation.\"    Recheck EKG    See above, re: discontinuing LABA      Pulmonary HTN , group 3    Noted      S/P colostomy -since colonic perforation in 5/2016 due to sigmoid diverticulitis    Noted    [facility] consult for routine ostomy cares      Open displaced fracture of proximal phalanx of left ring finger, initial encounter    Carmen tripped at home on her oxygen tubing    She was seen by orthopedics at [facility], they applied a cast, recommended a course of oral Keflex and outpatient follow-up with Ortho     Anemia in other chronic diseases classified elsewhere    Hemoglobin is stable    Goals of care:   Again discussed CODE STATUS and goals of care with sonNarciso, at the bedside.  He indicates she desires DNR/DNI, she agrees.    We discussed that recent blood gases show progressive hypercarbia which may recur if she is off BiPAP for any length of time.  They would like to continue routine medical cares including BiPAP, nebulized bronchodilators, antibiotics and see if she responds to this.  If not, they would consider changing emphasis to her comfort and involving palliative care.    Total time spent:  > 35 minutes  Time spent counseling, coordination of care: 25 minutes including discussion with care team and Dr. Boyce, also son, Narciso, regarding psychosis and antipsychotic medication, dyspnea and respiratory support, also personal " "review and interpretation of labs and studies as noted above      Diet: Regular Diet Adult    DVT Prophylaxis: Enoxaparin (Lovenox) SQ  Quinn Catheter: Not present  Central Lines: None  Code Status: No CPR- Do NOT Intubate      Disposition Plan   Expected Discharge: several days  [facility]  Delays:     improved mental status    Patience Knapp MD  Hospitalist Service  [facility]  Securely message with the Vocera Web Console (learn more here)  Text page via [employer] Paging/Directory      Clinically Significant Risk Factors Present on Admission                  ______________________________________________________________________    Interval History   \"This is torture.\"  Carmen dislikes the BiPAP mask and says she wants it removed.  I reminded her that she appreciated having the mask on a couple of days ago and said she could, \"live like this.\"  She says mask used to provide benefit, it no longer does, she wants it removed.  She says she does not care if removing BiPAP hastens her death, \"I am just fine with that.\"  She says she will wear BiPAP overnight.  She wants ice chips and ice cream.    Data reviewed today: I reviewed all medications, new labs and imaging results over the last 24 hours. I personally reviewed the chest CT image(s) showing Spiculated nodules, advanced bullous emphysema.    Physical Exam   Vital Signs: Temp: 98.3  F (36.8  C) Temp src: Axillary BP: 134/55 Pulse: 90   Resp: 25 SpO2: 92 % O2 Device: Nasal cannula Oxygen Delivery: 5 LPM  Weight: 124 lbs 5.43 oz  Constitutional: Awake, alert.    Respiratory: Decreased breath sounds throughout.    Cardiovascular: Tachycardic, regular rate and rhythm  GI: Normal bowel sounds, soft, ostomy appliance with soft brown effluent  Skin/Integumen: No rash on exposed skin  Other: Mood is very upset, but softens when I tell her she can have the BiPAP mask removed      Data   Recent " Labs   Lab 12/31/21  0514 12/30/21  0558 12/29/21  0725 12/29/21  0544 12/28/21  2131 12/28/21  1102   WBC 11.3* 14.2*  --  6.4  --  16.2*   HGB 8.3* 9.1*  --  8.7*  --  10.2*   * 103*  --  104*  --  105*    290  --  268  --  286   * 142  --  140  --  142   POTASSIUM 4.1 4.9  --  5.0  --  4.3   CHLORIDE 103 100  --  98  --  98   CO2 45* >45*  --  41*  --  >45*   BUN 37* 38*  --  31*  --  17   CR 0.69 0.60  --  0.60  --  0.55   ANIONGAP <1* <1*  --  1*  --  <1*   OLI 9.1 9.3  --  8.9  --  8.6   * 143* 100* 112*   < > 98   ALBUMIN  --   --   --   --   --  3.1*   PROTTOTAL  --   --   --   --   --  6.5*   BILITOTAL  --   --   --   --   --  0.2   ALKPHOS  --   --   --   --   --  19*   ALT  --   --   --   --   --  27   AST  --   --   --   --   --  13    < > = values in this interval not displayed.     No results found for this or any previous visit (from the past 24 hour(s)).  Medications     dextrose 5% and 0.9% NaCl 50 mL/hr at 12/31/21 0700     - MEDICATION INSTRUCTIONS -       - MEDICATION INSTRUCTIONS -         acetaminophen  1,000 mg Oral TID     diltiazem ER COATED BEADS  120 mg Oral Daily     enoxaparin ANTICOAGULANT  40 mg Subcutaneous Q24H     ipratropium - albuterol 0.5 mg/2.5 mg/3 mL  3 mL Nebulization 4x Daily     methylPREDNISolone  4 mg Oral Daily     piperacillin-tazobactam  3.375 g Intravenous Q6H     senna-docusate  1 tablet Oral Daily     sodium chloride (PF)  3 mL Intracatheter Q8H     sodium chloride (PF)  3 mL Intracatheter Q8H     umeclidinium  1 puff Inhalation Daily

## 2021-12-31 NOTE — PROGRESS NOTES
Hari's test performed prior to radial ABG draw. Collateral circulation confirmed.   Reshma Gonzalez, RT

## 2021-12-31 NOTE — PROGRESS NOTES
"[facility] brief note:    Paged by nursing as pt has an \"abdominal bulge\", soft but pt reported pain when laying flat, also has EKG changes.    Presented to pt's bedside; pt resting on Bipap, in no apparent distress, pulling TV upper 300s, RR upper 10s, triggering Bipap 88-89%.  Upon evaluation, just lateral to the right aspect of midline abdominal scar, pt with ~ 2 inch in diameter hernia, easily reducible, with noted obvious abdominal wall opening ~ 1 in in diameter.  No obvious acute abdominal pain noted.  EKG reviewed, no obvious acute change noted.  EKG notes NSR, TWI lead aVL; unchanged from previous.  Remains hemodynamically stable a this time.    WINTER Hunt, CNP  [facility]    No charge.          "

## 2021-12-31 NOTE — PROGRESS NOTES
"RT came to bedside around 0840 to give pt scheduled neb. Pt stated,  \"Take this mask off (pointing to the BiPAP mask).\" Patient was taken off of BiPAP and placed on a 3.5 L NC which is baseline for the pt. SpO2 decreased into the mid 80's. Oxygen increased to 5L NC with SpO2 in the low 90's. Scheduled neb was given. BS diminished Bedside RN aware pt was off of BiPAP.     RT came back to bedside around 1230 to give pt scheduled neb. Pt was placed back on BiPAP 15/5 35% at this time due to pt sleeping. Liquicell and mepilex in place. Skin intact. Alarm volume set at 10. Neb given inline BiPAP.    Plan of care regarding to BiPAP was discussed with Dr. Knapp and Bedside RN. Plan for pt to wear BiPAP overnight and naps, with at least one period of time during the day on BiPAP.    RT will work with nursing to coordinate times for on/off BiPAP.  Will cont to follow.  12/31/2021  Latonya Ramirez, RT     "

## 2021-12-31 NOTE — PROVIDER NOTIFICATION
"[employer] message sent to Dr. Knapp:    \"Pt's heart rate has been maintaining 130-142.\"    Discussed:  -tele tech notified RN of brief run of 11 beats at a rate of 169bpm, however, once tele tech printed and notified writer pt HR was down to 111  -HR sometimes increases when pt more excitable   -Notify provider if sustaining 140+  "

## 2021-12-31 NOTE — CONSULTS
"    Psychiatry Consultation; Follow up          Reason for Consult, requesting source:    Psychosis, behaviors  Requesting source: Patience Knapp            Interim history:    Carmen Perez is a 71 year old female who was admitted to [facility] on 12/28/21 after presenting to the emergency department with agitation. She was staying at [facility] but felt that staff were not able to meet her needs. There was a situation which led her to dial 911, and she was transported to the ED. Initially hospitalized at [facility] 12/16-12/19 for acute COPD exacerbation, as well as open fx of left 4th proximal phalanx. Currently being treated for pneumonia. CT chest demonstrating lung nodules concerning for malignancy. Psychiatry is consulted for evaluation of \"psychosis.\"     Ms. Perez is seen for follow-up today. She is now on a 1:1. She apparently became agitated last evening, requiring IM olanzapine, which she received at 2034. Unclear response to this intervention. She also had received 2 mg IV haloperidol at 1317 yesterday, with apparently no response.     On interview today, patient seen seated in her bedside chair, watching TV and eating ice cream. She is calm-appearing, not currently agitated. Reports she slept well last evening, denies recalling any difficulty overnight. She does endorse feeling edgy and agitated. She is able to tell me that her son is concerned that she may be experiencing delusions. She reports that he is concerned that she has become more \"accusatory,\" stating \"I'm not generally an accusatory person.\" When asked what she thinks about her son's comments, she goes on to say that people should not be stealing things from her (referring to her time at [employer]). She continues to endorse visual hallucinations, stating she was seeing \"spirits\" overnight. She makes comments about \"cutting people\" but states she uses this jokingly, and denies any thoughts, plans, or intent to harm herself or " anyone.          Medications:     Current Facility-Administered Medications   Medication     acetaminophen (TYLENOL) tablet 650 mg    Or     acetaminophen (TYLENOL) Suppository 650 mg     acetaminophen (TYLENOL) tablet 1,000 mg     albuterol (PROVENTIL) neb solution 2.5 mg     carboxymethylcellulose PF (REFRESH PLUS) 0.5 % ophthalmic solution 1 drop     dextrose 5% and 0.9% NaCl infusion     diltiazem ER COATED BEADS (CARDIZEM CD/CARTIA XT) 24 hr capsule 120 mg     enoxaparin ANTICOAGULANT (LOVENOX) injection 40 mg     ipratropium - albuterol 0.5 mg/2.5 mg/3 mL (DUONEB) neb solution 3 mL     lidocaine (LMX4) cream     lidocaine 1 % 0.1-1 mL     melatonin tablet 1 mg     methylPREDNISolone (MEDROL) tablet 4 mg     No lozenges or gum should be given while patient on BIPAP/AVAPS/AVAPS AE     OLANZapine zydis (zyPREXA) ODT tab 5 mg     ondansetron (ZOFRAN-ODT) ODT tab 4 mg    Or     ondansetron (ZOFRAN) injection 4 mg     Patient may continue current oral medications     piperacillin-tazobactam (ZOSYN) 3.375 g vial to attach to  mL bag     QUEtiapine (SEROquel) tablet 25-50 mg     senna-docusate (SENOKOT-S/PERICOLACE) 8.6-50 MG per tablet 1 tablet    Or     senna-docusate (SENOKOT-S/PERICOLACE) 8.6-50 MG per tablet 2 tablet     senna-docusate (SENOKOT-S/PERICOLACE) 8.6-50 MG per tablet 1 tablet     sodium chloride (PF) 0.9% PF flush 3 mL     sodium chloride (PF) 0.9% PF flush 3 mL     sodium chloride (PF) 0.9% PF flush 3 mL     sodium chloride (PF) 0.9% PF flush 3 mL     umeclidinium (INCRUSE ELLIPTA) 62.5 MCG/INH inhaler 1 puff              MSE:     Appearance: age-appearing, wearing hospital gown, seated in bedside chair, adequate hygiene and grooming, in no acute distress  Behavior: calm, a bit edgy  Eye contact: appropriate  Orientation: alert and oriented to self, hospital, not oriented to date, grossly oriented to reason for hospitalization  Movements: did not observe any tics, tremors, or dystonia  Mood:  "\"okay\"  Affect: mood-congruent, stable, mildly irritable  Speech: Normal rate, rhythm, tone  Language: fluent, with appropriately placed inflections, fair articulation  Memory: recent and remote memory appear mildly impaired; has word-finding problems  Fund of knowledge: average for development  Concentration: distractible  Thought process and content: mildly disorganized, circumstantial. Delusional beliefs regarding people stealing things from her at [employer]; endorses visual hallucinations of people and figures. Denies auditory hallucinations.   Associations: no loosened associations  Insight: fair   Judgement: fair  Safety: denies suicidal or homicidal ideation    Vital signs:  Temp: 98.3  F (36.8  C) Temp src: Axillary BP: 134/55 Pulse: 90   Resp: 25 SpO2: 92 % O2 Device: Nasal cannula Oxygen Delivery: 5 LPM Height: 160 cm (5' 3\") Weight: 56.4 kg (124 lb 5.4 oz)  Estimated body mass index is 22.03 kg/m  as calculated from the following:    Height as of this encounter: 1.6 m (5' 3\").    Weight as of this encounter: 56.4 kg (124 lb 5.4 oz).              DSM-5 Diagnosis:   Encephalopathy, likely multifactorial in the setting of infection, hypercarbia, and corticosteroids  Unspecified depressive disorder, by history          Assessment:   Ms. Perez is seen for follow-up today. She apparently had an episode of agitation last evening, requiring IM olanzapine. Unclear if this produced a positive response. She continues to demonstrate a labile mood, appears a bit edgy. She continues to experience visual hallucinations, reports seeing spirits overnight. She is now on a bedside attendant. Given the fact that she continues to require corticosteroids, would suggest we schedule olanzapine to help with agitation and hallucinations. Unfortunately, her hypercarbia is not improving and thus she will likely continue to struggle with these symptoms. Would continue to monitor QTc while receiving olanzapine. Can continue to utilize " quetiapine prn, which may help with anxiety/edginess. Discussed with patient's RN.           Summary of Recommendations:   1) Will schedule olanzapine ODT 5 mg BID for agitation and hallucinations in the setting of ongoing hypercarbia and need for corticosteroids    2) Continue to utilize quetiapine 25 mg to 50 mg q4h prn for agitation, anxiety, insomnia. May help with her edginess.     3) Re-consult psychiatry as needed, thank you    Enrrique Greenfield, THANG-BC, APRN, CNP  Consult/Liaison Psychiatry  [facility]  Provider can be paged via [facility] Paging/Directory  If I am unavailable, please contact [facility] at [phone] to reach the on-call provider.

## 2021-12-31 NOTE — PROGRESS NOTES
Pulmonary Note      Events noted; tachy cardia 100-130's, abnormal EKG suggesting infarct/conduction defect several times, and continuing if not worse paranoia and anxiety have delayed discharge. Her severe COPD has been managed but she does best on biPAP which her mental status makes difficult. Chronic hypercarbia and acutely worsens at the drop of a hat.   Agree with attempts to keep biPAP to sleep and naps.   Suggest ECHO, troponin.   Suggest changing from prednisone/solumdrol to lower dose medrol to see if we can reduce mental issues and anxiety. Discussed with Dr JOHNSON.   Suggest also we stay away from LABA drugs, per my previous note, and use xopenex not albuterol for KARINA. Xopenex CHASE.   Will update CXR    We will follow up after imaging.     [facility]  [phone]

## 2021-12-31 NOTE — PROVIDER NOTIFICATION
"After speaking with Dr. Knapp this morning and informing her that RT took pt off bipap and on 5L O2 NC, Dr. Knapp asked writer to have RT call her to discuss plan of care.    Text paged RT and discussed together.    UPDATE:  Dr. Knapp, RT and writer all discussed on floor, plan for bipap.  In order to best try to balance needs for bipap related to blood gases along with pt's desires to \"get the mask off\" we agree bipap is necessary for sleeping overnight and naps, needs at least a period of time during the day on bipap.    Writer will evaluate timing of oral medications along with time for pt to eat breakfast and around dinner time to create scheduled bipap times.    RT and nursing will coordinate on/off bipap times.    Dr. Knapp spoke with pt who now is stating the mask \"is torture\".  -writer will take pt off bipap at her request and attempt plan as discussed above to balance needs.  "

## 2021-12-31 NOTE — PLAN OF CARE
Pt disoriented to situation and forgetful. VSS on Bipap at 40% FiO2. Tele NSR. ABGs improving, RT adjusted bipap settings, hospitalist aware. Lung sounds diminished. New abdominal hernia present, NP assessed, still reducible and not painful. Voiding adequately, requests purewick. Colostomy w/ small amount of liquid stool. Denies pain. NPO per bipap. Up w/ 1.

## 2021-12-31 NOTE — PROVIDER NOTIFICATION
Hospitalist service cross cover:    Paged by nursing staff regarding agitation    PLAN:  -- 5 mg IM zyprexa    Please page with additional concerns,     WINTER Garcia, CNP  Hospitalist  Text Page  [phone])

## 2021-12-31 NOTE — PLAN OF CARE
A/O X 3 except situation. VSS. 3-5L NC for most of the day sating at 88-90%.  Critical Co2 came back at 92 and pt became lethargic. Was then moved to [facility] at 35% fiO2. Tele: ST. D5NS @ 50 was started. Haldol was given once with no result, Seroquel was then added Q4H PRN. Paranoid/psychosis, pysch evaluated. Son, Narciso, at bedside this afternoon. Up A X1 GB/W. Regular diet. Ostomy WOC consulted.Plan pending. Likley discharge to [facility].

## 2021-12-31 NOTE — PROVIDER NOTIFICATION
MD Notification    Notified Person: MD    Notified Person Name: Ra     Notification Date/Time: 12/30, 1800    Notification Interaction: page     Purpose of Notification: critical lab, CO2 92    Orders Received:    Comments:

## 2022-01-01 ENCOUNTER — APPOINTMENT (OUTPATIENT)
Dept: CARDIOLOGY | Facility: CLINIC | Age: 72
DRG: 189 | End: 2022-01-01
Attending: INTERNAL MEDICINE
Payer: MEDICARE

## 2022-01-01 ENCOUNTER — APPOINTMENT (OUTPATIENT)
Dept: GENERAL RADIOLOGY | Facility: CLINIC | Age: 72
DRG: 189 | End: 2022-01-01
Attending: INTERNAL MEDICINE
Payer: MEDICARE

## 2022-01-01 VITALS
WEIGHT: 124.34 LBS | SYSTOLIC BLOOD PRESSURE: 106 MMHG | HEIGHT: 63 IN | HEART RATE: 115 BPM | OXYGEN SATURATION: 92 % | DIASTOLIC BLOOD PRESSURE: 51 MMHG | RESPIRATION RATE: 20 BRPM | TEMPERATURE: 98.7 F | BODY MASS INDEX: 22.03 KG/M2

## 2022-01-01 LAB
ANION GAP SERPL CALCULATED.3IONS-SCNC: <1 MMOL/L (ref 3–14)
ANION GAP SERPL CALCULATED.3IONS-SCNC: <1 MMOL/L (ref 3–14)
BUN SERPL-MCNC: 27 MG/DL (ref 7–30)
BUN SERPL-MCNC: 34 MG/DL (ref 7–30)
CALCIUM SERPL-MCNC: 8.9 MG/DL (ref 8.5–10.1)
CALCIUM SERPL-MCNC: 9.1 MG/DL (ref 8.5–10.1)
CHLORIDE BLD-SCNC: 104 MMOL/L (ref 94–109)
CHLORIDE BLD-SCNC: 106 MMOL/L (ref 94–109)
CO2 SERPL-SCNC: 42 MMOL/L (ref 20–32)
CO2 SERPL-SCNC: >45 MMOL/L (ref 20–32)
CREAT SERPL-MCNC: 0.53 MG/DL (ref 0.52–1.04)
CREAT SERPL-MCNC: 0.54 MG/DL (ref 0.52–1.04)
ERYTHROCYTE [DISTWIDTH] IN BLOOD BY AUTOMATED COUNT: 13.1 % (ref 10–15)
ERYTHROCYTE [DISTWIDTH] IN BLOOD BY AUTOMATED COUNT: 13.4 % (ref 10–15)
GFR SERPL CREATININE-BSD FRML MDRD: >90 ML/MIN/1.73M2
GFR SERPL CREATININE-BSD FRML MDRD: >90 ML/MIN/1.73M2
GLUCOSE BLD-MCNC: 109 MG/DL (ref 70–99)
GLUCOSE BLD-MCNC: 159 MG/DL (ref 70–99)
HCT VFR BLD AUTO: 32.3 % (ref 35–47)
HCT VFR BLD AUTO: 35.7 % (ref 35–47)
HGB BLD-MCNC: 8.2 G/DL (ref 11.7–15.7)
HGB BLD-MCNC: 9.6 G/DL (ref 11.7–15.7)
LVEF ECHO: NORMAL
MCH RBC QN AUTO: 27.6 PG (ref 26.5–33)
MCH RBC QN AUTO: 27.7 PG (ref 26.5–33)
MCHC RBC AUTO-ENTMCNC: 25.4 G/DL (ref 31.5–36.5)
MCHC RBC AUTO-ENTMCNC: 26.9 G/DL (ref 31.5–36.5)
MCV RBC AUTO: 103 FL (ref 78–100)
MCV RBC AUTO: 109 FL (ref 78–100)
PLATELET # BLD AUTO: 207 10E3/UL (ref 150–450)
PLATELET # BLD AUTO: 232 10E3/UL (ref 150–450)
POTASSIUM BLD-SCNC: 4.2 MMOL/L (ref 3.4–5.3)
POTASSIUM BLD-SCNC: 4.3 MMOL/L (ref 3.4–5.3)
PROCALCITONIN SERPL-MCNC: <0.05 NG/ML
RBC # BLD AUTO: 2.96 10E6/UL (ref 3.8–5.2)
RBC # BLD AUTO: 3.48 10E6/UL (ref 3.8–5.2)
SODIUM SERPL-SCNC: 146 MMOL/L (ref 133–144)
SODIUM SERPL-SCNC: 146 MMOL/L (ref 133–144)
TROPONIN I SERPL HS-MCNC: 51 NG/L
WBC # BLD AUTO: 12 10E3/UL (ref 4–11)
WBC # BLD AUTO: 13.8 10E3/UL (ref 4–11)

## 2022-01-01 PROCEDURE — 99207 PR CDG-MDM COMPONENT: MEETS HIGH - UP CODED: CPT | Performed by: INTERNAL MEDICINE

## 2022-01-01 PROCEDURE — 250N000013 HC RX MED GY IP 250 OP 250 PS 637: Performed by: NURSE PRACTITIONER

## 2022-01-01 PROCEDURE — 36415 COLL VENOUS BLD VENIPUNCTURE: CPT | Performed by: INTERNAL MEDICINE

## 2022-01-01 PROCEDURE — 250N000013 HC RX MED GY IP 250 OP 250 PS 637: Performed by: INTERNAL MEDICINE

## 2022-01-01 PROCEDURE — 71045 X-RAY EXAM CHEST 1 VIEW: CPT

## 2022-01-01 PROCEDURE — 93306 TTE W/DOPPLER COMPLETE: CPT

## 2022-01-01 PROCEDURE — 94640 AIRWAY INHALATION TREATMENT: CPT

## 2022-01-01 PROCEDURE — 250N000011 HC RX IP 250 OP 636: Performed by: INTERNAL MEDICINE

## 2022-01-01 PROCEDURE — 94660 CPAP INITIATION&MGMT: CPT

## 2022-01-01 PROCEDURE — 250N000012 HC RX MED GY IP 250 OP 636 PS 637: Performed by: INTERNAL MEDICINE

## 2022-01-01 PROCEDURE — 120N000001 HC R&B MED SURG/OB

## 2022-01-01 PROCEDURE — 85014 HEMATOCRIT: CPT | Performed by: INTERNAL MEDICINE

## 2022-01-01 PROCEDURE — 99233 SBSQ HOSP IP/OBS HIGH 50: CPT | Performed by: INTERNAL MEDICINE

## 2022-01-01 PROCEDURE — 94640 AIRWAY INHALATION TREATMENT: CPT | Mod: 76

## 2022-01-01 PROCEDURE — 258N000003 HC RX IP 258 OP 636: Performed by: INTERNAL MEDICINE

## 2022-01-01 PROCEDURE — 250N000009 HC RX 250: Performed by: INTERNAL MEDICINE

## 2022-01-01 PROCEDURE — 84145 PROCALCITONIN (PCT): CPT | Performed by: INTERNAL MEDICINE

## 2022-01-01 PROCEDURE — 93306 TTE W/DOPPLER COMPLETE: CPT | Mod: 26 | Performed by: INTERNAL MEDICINE

## 2022-01-01 PROCEDURE — 80048 BASIC METABOLIC PNL TOTAL CA: CPT | Performed by: INTERNAL MEDICINE

## 2022-01-01 PROCEDURE — 999N000157 HC STATISTIC RCP TIME EA 10 MIN

## 2022-01-01 PROCEDURE — 99238 HOSP IP/OBS DSCHRG MGMT 30/<: CPT | Performed by: INTERNAL MEDICINE

## 2022-01-01 PROCEDURE — 85027 COMPLETE CBC AUTOMATED: CPT | Performed by: INTERNAL MEDICINE

## 2022-01-01 PROCEDURE — 84484 ASSAY OF TROPONIN QUANT: CPT | Performed by: INTERNAL MEDICINE

## 2022-01-01 PROCEDURE — 99232 SBSQ HOSP IP/OBS MODERATE 35: CPT | Performed by: INTERNAL MEDICINE

## 2022-01-01 RX ORDER — IPRATROPIUM BROMIDE AND ALBUTEROL SULFATE 2.5; .5 MG/3ML; MG/3ML
3 SOLUTION RESPIRATORY (INHALATION)
Status: DISCONTINUED | OUTPATIENT
Start: 2022-01-01 | End: 2022-01-01

## 2022-01-01 RX ORDER — DEXTROSE MONOHYDRATE 50 MG/ML
INJECTION, SOLUTION INTRAVENOUS CONTINUOUS
Status: ACTIVE | OUTPATIENT
Start: 2022-01-01 | End: 2022-01-01

## 2022-01-01 RX ORDER — MORPHINE SULFATE 2 MG/ML
1 INJECTION, SOLUTION INTRAMUSCULAR; INTRAVENOUS
Status: COMPLETED | OUTPATIENT
Start: 2022-01-01 | End: 2022-01-01

## 2022-01-01 RX ORDER — GLYCOPYRROLATE 0.2 MG/ML
0.1 INJECTION, SOLUTION INTRAMUSCULAR; INTRAVENOUS
Status: DISCONTINUED | OUTPATIENT
Start: 2022-01-01 | End: 2022-01-01 | Stop reason: HOSPADM

## 2022-01-01 RX ADMIN — LEVALBUTEROL HYDROCHLORIDE 1.25 MG: 1.25 SOLUTION, CONCENTRATE RESPIRATORY (INHALATION) at 14:14

## 2022-01-01 RX ADMIN — SENNOSIDES AND DOCUSATE SODIUM 1 TABLET: 50; 8.6 TABLET ORAL at 13:06

## 2022-01-01 RX ADMIN — IPRATROPIUM BROMIDE 0.5 MG: 0.5 SOLUTION RESPIRATORY (INHALATION) at 11:43

## 2022-01-01 RX ADMIN — LEVALBUTEROL HYDROCHLORIDE 1.25 MG: 1.25 SOLUTION, CONCENTRATE RESPIRATORY (INHALATION) at 07:17

## 2022-01-01 RX ADMIN — DEXTROSE AND SODIUM CHLORIDE: 5; 900 INJECTION, SOLUTION INTRAVENOUS at 16:40

## 2022-01-01 RX ADMIN — DILTIAZEM HYDROCHLORIDE 120 MG: 120 CAPSULE, COATED, EXTENDED RELEASE ORAL at 13:05

## 2022-01-01 RX ADMIN — LEVALBUTEROL HYDROCHLORIDE 1.25 MG: 1.25 SOLUTION, CONCENTRATE RESPIRATORY (INHALATION) at 11:43

## 2022-01-01 RX ADMIN — OLANZAPINE 5 MG: 5 TABLET, ORALLY DISINTEGRATING ORAL at 10:06

## 2022-01-01 RX ADMIN — METHYLPREDNISOLONE 4 MG: 4 TABLET ORAL at 13:04

## 2022-01-01 RX ADMIN — IPRATROPIUM BROMIDE 0.5 MG: 0.5 SOLUTION RESPIRATORY (INHALATION) at 14:14

## 2022-01-01 RX ADMIN — PIPERACILLIN SODIUM AND TAZOBACTAM SODIUM 3.38 G: 3; .375 INJECTION, POWDER, LYOPHILIZED, FOR SOLUTION INTRAVENOUS at 16:37

## 2022-01-01 RX ADMIN — PIPERACILLIN SODIUM AND TAZOBACTAM SODIUM 3.38 G: 3; .375 INJECTION, POWDER, LYOPHILIZED, FOR SOLUTION INTRAVENOUS at 05:54

## 2022-01-01 RX ADMIN — PIPERACILLIN SODIUM AND TAZOBACTAM SODIUM 3.38 G: 3; .375 INJECTION, POWDER, LYOPHILIZED, FOR SOLUTION INTRAVENOUS at 11:30

## 2022-01-01 RX ADMIN — IPRATROPIUM BROMIDE 0.5 MG: 0.5 SOLUTION RESPIRATORY (INHALATION) at 07:17

## 2022-01-01 RX ADMIN — UMECLIDINIUM 1 PUFF: 62.5 AEROSOL, POWDER ORAL at 11:33

## 2022-01-01 RX ADMIN — DILTIAZEM HYDROCHLORIDE 120 MG: 120 CAPSULE, COATED, EXTENDED RELEASE ORAL at 10:06

## 2022-01-01 RX ADMIN — LEVALBUTEROL HYDROCHLORIDE 1.25 MG: 1.25 SOLUTION, CONCENTRATE RESPIRATORY (INHALATION) at 07:40

## 2022-01-01 RX ADMIN — IPRATROPIUM BROMIDE 0.5 MG: 0.5 SOLUTION RESPIRATORY (INHALATION) at 07:40

## 2022-01-01 RX ADMIN — SENNOSIDES AND DOCUSATE SODIUM 1 TABLET: 50; 8.6 TABLET ORAL at 10:06

## 2022-01-01 RX ADMIN — PIPERACILLIN SODIUM AND TAZOBACTAM SODIUM 3.38 G: 3; .375 INJECTION, POWDER, LYOPHILIZED, FOR SOLUTION INTRAVENOUS at 10:47

## 2022-01-01 RX ADMIN — LEVALBUTEROL HYDROCHLORIDE 1.25 MG: 1.25 SOLUTION, CONCENTRATE RESPIRATORY (INHALATION) at 15:42

## 2022-01-01 RX ADMIN — PIPERACILLIN SODIUM AND TAZOBACTAM SODIUM 3.38 G: 3; .375 INJECTION, POWDER, LYOPHILIZED, FOR SOLUTION INTRAVENOUS at 00:05

## 2022-01-01 RX ADMIN — ACETAMINOPHEN 1000 MG: 500 TABLET, FILM COATED ORAL at 13:06

## 2022-01-01 RX ADMIN — ENOXAPARIN SODIUM 40 MG: 40 INJECTION SUBCUTANEOUS at 20:53

## 2022-01-01 RX ADMIN — PIPERACILLIN SODIUM AND TAZOBACTAM SODIUM 3.38 G: 3; .375 INJECTION, POWDER, LYOPHILIZED, FOR SOLUTION INTRAVENOUS at 04:57

## 2022-01-01 RX ADMIN — IPRATROPIUM BROMIDE 0.5 MG: 0.5 SOLUTION RESPIRATORY (INHALATION) at 15:42

## 2022-01-01 RX ADMIN — IPRATROPIUM BROMIDE 0.5 MG: 0.5 SOLUTION RESPIRATORY (INHALATION) at 20:49

## 2022-01-01 RX ADMIN — ENOXAPARIN SODIUM 40 MG: 40 INJECTION SUBCUTANEOUS at 23:02

## 2022-01-01 RX ADMIN — MORPHINE SULFATE 1 MG: 2 INJECTION, SOLUTION INTRAMUSCULAR; INTRAVENOUS at 21:23

## 2022-01-01 RX ADMIN — ACETAMINOPHEN 1000 MG: 500 TABLET, FILM COATED ORAL at 17:52

## 2022-01-01 RX ADMIN — OLANZAPINE 5 MG: 5 TABLET, ORALLY DISINTEGRATING ORAL at 20:54

## 2022-01-01 RX ADMIN — ACETAMINOPHEN 1000 MG: 500 TABLET, FILM COATED ORAL at 10:06

## 2022-01-01 RX ADMIN — DEXTROSE MONOHYDRATE: 50 INJECTION, SOLUTION INTRAVENOUS at 11:32

## 2022-01-01 RX ADMIN — PIPERACILLIN SODIUM AND TAZOBACTAM SODIUM 3.38 G: 3; .375 INJECTION, POWDER, LYOPHILIZED, FOR SOLUTION INTRAVENOUS at 18:02

## 2022-01-01 RX ADMIN — PIPERACILLIN SODIUM AND TAZOBACTAM SODIUM 3.38 G: 3; .375 INJECTION, POWDER, LYOPHILIZED, FOR SOLUTION INTRAVENOUS at 05:18

## 2022-01-01 RX ADMIN — METHYLPREDNISOLONE 4 MG: 4 TABLET ORAL at 10:06

## 2022-01-01 RX ADMIN — ACETAMINOPHEN 1000 MG: 500 TABLET, FILM COATED ORAL at 21:04

## 2022-01-01 RX ADMIN — PIPERACILLIN SODIUM AND TAZOBACTAM SODIUM 3.38 G: 3; .375 INJECTION, POWDER, LYOPHILIZED, FOR SOLUTION INTRAVENOUS at 23:02

## 2022-01-01 RX ADMIN — LEVALBUTEROL HYDROCHLORIDE 1.25 MG: 1.25 SOLUTION, CONCENTRATE RESPIRATORY (INHALATION) at 20:58

## 2022-01-01 RX ADMIN — PIPERACILLIN SODIUM AND TAZOBACTAM SODIUM 3.38 G: 3; .375 INJECTION, POWDER, LYOPHILIZED, FOR SOLUTION INTRAVENOUS at 23:14

## 2022-01-01 RX ADMIN — UMECLIDINIUM 1 PUFF: 62.5 AEROSOL, POWDER ORAL at 11:14

## 2022-01-01 ASSESSMENT — ACTIVITIES OF DAILY LIVING (ADL)
ADLS_ACUITY_SCORE: 25
ADLS_ACUITY_SCORE: 22
ADLS_ACUITY_SCORE: 26
ADLS_ACUITY_SCORE: 22
ADLS_ACUITY_SCORE: 16
ADLS_ACUITY_SCORE: 22
ADLS_ACUITY_SCORE: 16
ADLS_ACUITY_SCORE: 22
ADLS_ACUITY_SCORE: 25
ADLS_ACUITY_SCORE: 25
ADLS_ACUITY_SCORE: 24
ADLS_ACUITY_SCORE: 22
ADLS_ACUITY_SCORE: 22
ADLS_ACUITY_SCORE: 16
ADLS_ACUITY_SCORE: 26
ADLS_ACUITY_SCORE: 22
ADLS_ACUITY_SCORE: 22
ADLS_ACUITY_SCORE: 16
ADLS_ACUITY_SCORE: 24
ADLS_ACUITY_SCORE: 25
ADLS_ACUITY_SCORE: 26
ADLS_ACUITY_SCORE: 16
ADLS_ACUITY_SCORE: 25
ADLS_ACUITY_SCORE: 22
ADLS_ACUITY_SCORE: 22
ADLS_ACUITY_SCORE: 25
ADLS_ACUITY_SCORE: 16
ADLS_ACUITY_SCORE: 22
ADLS_ACUITY_SCORE: 22
ADLS_ACUITY_SCORE: 26
ADLS_ACUITY_SCORE: 16
ADLS_ACUITY_SCORE: 26
ADLS_ACUITY_SCORE: 24
ADLS_ACUITY_SCORE: 25
ADLS_ACUITY_SCORE: 22
ADLS_ACUITY_SCORE: 16
ADLS_ACUITY_SCORE: 16
ADLS_ACUITY_SCORE: 22
ADLS_ACUITY_SCORE: 16
ADLS_ACUITY_SCORE: 16
ADLS_ACUITY_SCORE: 25
ADLS_ACUITY_SCORE: 22
ADLS_ACUITY_SCORE: 25
ADLS_ACUITY_SCORE: 16
ADLS_ACUITY_SCORE: 22
ADLS_ACUITY_SCORE: 26
ADLS_ACUITY_SCORE: 26
ADLS_ACUITY_SCORE: 25
ADLS_ACUITY_SCORE: 16
ADLS_ACUITY_SCORE: 25
ADLS_ACUITY_SCORE: 25
ADLS_ACUITY_SCORE: 16
ADLS_ACUITY_SCORE: 26
ADLS_ACUITY_SCORE: 16
ADLS_ACUITY_SCORE: 25
ADLS_ACUITY_SCORE: 26
ADLS_ACUITY_SCORE: 26
ADLS_ACUITY_SCORE: 24
ADLS_ACUITY_SCORE: 25
ADLS_ACUITY_SCORE: 25
ADLS_ACUITY_SCORE: 22
ADLS_ACUITY_SCORE: 16
ADLS_ACUITY_SCORE: 22
ADLS_ACUITY_SCORE: 25
ADLS_ACUITY_SCORE: 26
ADLS_ACUITY_SCORE: 25
ADLS_ACUITY_SCORE: 16
ADLS_ACUITY_SCORE: 16
ADLS_ACUITY_SCORE: 22
ADLS_ACUITY_SCORE: 26
ADLS_ACUITY_SCORE: 16
ADLS_ACUITY_SCORE: 22
ADLS_ACUITY_SCORE: 25
ADLS_ACUITY_SCORE: 26
ADLS_ACUITY_SCORE: 25
ADLS_ACUITY_SCORE: 25
ADLS_ACUITY_SCORE: 22
ADLS_ACUITY_SCORE: 22
ADLS_ACUITY_SCORE: 25

## 2022-01-01 NOTE — PLAN OF CARE
A&O, easily excitable/frustrated however, has seemed to calm down more this afternoon/evening. Bedside sitter present- seemed to have vibed well with the CNAs that were sitters today.  VSS, was intermittently on bipap, or on 5L O2 NC, just decreased to 3L as sats were at 100%, baseline is 3.5L . Tele ST. Up assist x1 to pivot to chair. LS diminished. BS+. Leonora replaced this evening at pt's request, otherwise had one incontinent episode. Denies pain.     Plan for bipap over night 2 hours after bedtime PO meds.

## 2022-01-01 NOTE — PROGRESS NOTES
"Per report from [facility] RN pt had bipap on overnight approximately 2300 until 0500 this morning when she woke up and became upset, hostile with the sitter.    When writer arrived pt was alert, happy about having breakfast.   When writer returned later pt was very sleepy. Minimal verbal response, for example, when asked her birth date she kept repeating \"[birth year]\".  Was able to prompt more response asking her if she remembered the sad news we saw yesterday about someone passing away, she quickly remembered stating \"Sarah White! Oh I loved her!\"  However, she still would not open her eyes.     Writer unable to get pt to take her Oral medications yet.     Placed bipap back on at 1038 at 35% fi02  Increased Fi02 to 40% at 1108 after sats decreasing to 84%  Pt starting to become a little more alert, asking for ice chips.  "

## 2022-01-01 NOTE — PROVIDER NOTIFICATION
"[facility] message sent to Dr. Knapp:    \"Low urine output, bladder scan was 494cc. Orders for straight cath?\"    UPDATE:  Orders received for bladder scan/straight cath  Pt was holding her bladder, did not realize purewick was in place while she was napping, now voided another 170cc via purewick.    Goal to attempt up to commode next time.  "

## 2022-01-01 NOTE — PROGRESS NOTES
"PULMONOLOGY PROGRESS NOTE    Date of Admission: 12/28/2021    CC/Reason for Hospital visit: follow up COPD, encephalopathy, and hypercarbic resp failure  SUBJECTIVE      S; \"It's kind of hard to get uset to it (BiPAP)\".           ROS: A Problem Pertinent review of systems was negative except for items noted in HPI.  Past Medical, Family, and Social/Substance History has been reviewed: No interval changes.   OBJECTIVE   Vital signs:  Temp: 98.5  F (36.9  C) Temp src: Axillary BP: 115/87 Pulse: (!) 134   Resp: 28 SpO2: 98 % O2 Device: Nasal cannula Oxygen Delivery: 2.5 LPM Height: 160 cm (5' 3\") Weight: 56.4 kg (124 lb 5.4 oz)  Estimated body mass index is 22.03 kg/m  as calculated from the following:    Height as of this encounter: 1.6 m (5' 3\").    Weight as of this encounter: 56.4 kg (124 lb 5.4 oz).        I/O last 3 completed shifts:  In: 370 [P.O.:320; I.V.:50]  Out: 250 [Urine:200; Stool:50]      CONSTITUTIONAL/GENERAL APPEARANCE: Sound asleep on NC O2 4L Observed for a couple of minutes having obstructive hypopneas and sats running 87-85% on 4L. Woke patient.  No Apparent Distress. sats 88% on 4L nc awake.   PSYCHIATRIC: Pleasant and appropriate mood and affect. Oriented x 3.  EARS, NOSE,THROAT,MOUTH: External ears and nose overall normal. Normal oral mucosa.   NECK: Neck appearance normal. No neck masses and the thyroid is not enlarged.   RESPIRATORY: Non-labored effort.decreased bilaterally  CARDIOVASCULAR: S1, S2, regular rate and rhythm,     LABORATORY ASSESSMENT    Arterial Blood Gas  Recent Labs   Lab 12/31/21  0523 12/31/21  0146 12/30/21  1738 12/29/21  1700   PH 7.33* 7.35 7.30* 7.38   PCO2 88* 85* 92* 78*   PO2 134* 43* 84 105   HCO3 47* 47* 45* 46*   O2PER 50 35 4 24     CBC  Recent Labs   Lab 01/01/22  0756 12/31/21  0514 12/30/21  0558 12/29/21  0544   WBC 12.0* 11.3* 14.2* 6.4   RBC 3.48* 3.02* 3.28* 3.18*   HGB 9.6* 8.3* 9.1* 8.7*   HCT 35.7 31.4* 33.7* 32.9*   * 104* 103* 104*   MCH 27.6 " 27.5 27.7 27.4   MCHC 26.9* 26.4* 27.0* 26.4*   RDW 13.4 13.2 13.0 13.0    269 290 268     BMP  Recent Labs   Lab 22  0756 21  0514 21  0558 21  0725 21  0544   * 146* 142  --  140   POTASSIUM 4.3 4.1 4.9  --  5.0   CHLORIDE 106 103 100  --  98   OLI 8.9 9.1 9.3  --  8.9   CO2 42* 45* >45*  --  41*   BUN 34* 37* 38*  --  31*   CR 0.53 0.69 0.60  --  0.60   * 130* 143* 100* 112*     INRNo lab results found in last 7 days.   BNPNo lab results found in last 7 days.  VENOUS BLOOD GASES  Recent Labs   Lab 21  1649 21  1515 21  1206   PHV 7.21* 7.24* 7.21*   PCO2V 123* 111* 122*   PO2V 15* 31 23*   HCO3V 49* 48* 49*   TIFFANY 16.7* 16.7* 17.0*         Additional labs and/or comments:       IMAGING      Narrative & Impression   EXAM: XR CHEST PORTABLE 1 VIEW  LOCATION: [facility]  DATE/TIME: 2022, 8:35 AM     INDICATION: Severe COPD, abnormal EKG, tachycardia and respiratory failure.  COMPARISON: 2021.                                                                      IMPRESSION: Emphysematous changes and areas of bilateral pulmonary scarring appear stable. No new acute airspace disease identified. Normal cardiac silhouette.          PFT & OTHER TESTING     [facility]  Echocardiography Laboratory  [address] [place] [address]     Name: GEOFF BRUCE  MRN: [id number]  : 1950  Study Date: 2021 01:47 PM  Age: 71 yrs  Gender: Female  Patient Location: [facility]  Reason For Study: Atrial Fibrillation, Dyspnea, Cor Pulmonale  Ordering Physician: ANNMARIE PATEL  Performed By: Karyn Gutierrez     BSA: 1.6 m2  Height: 63 in  Weight: 120 lb  HR: 55  BP: 120/74 mmHg  ______________________________________________________________________________  Procedure  Complete Portable Echo Adult.  ______________________________________________________________________________  Interpretation  Summary     The visual ejection fraction is 55-60%.  Left ventricular systolic function is normal.  There is mild to moderate (1-2+) mitral regurgitation.  There is mild (1+) tricuspid regurgitation.  There is trace aortic regurgitation.  Borderline aortic root dilatation.  The echo is similar to previously except the inferior vena cava is more  dilated. The study was technically difficult.  ______________________________________________________________________________  Left Ventricle  The left ventricle is normal in size. There is normal left ventricular wall  thickness. Diastolic Doppler findings (E/E' ratio and/or other parameters)  suggest left ventricular filling pressures are indeterminate. The visual  ejection fraction is 55-60%. Left ventricular systolic function is normal.  Septal motion is consistent with conduction abnormality.     Right Ventricle  The right ventricle is normal in size and function.     Atria  Normal left atrial size. Right atrial size is normal. There is no color  Doppler evidence of an atrial shunt.     Mitral Valve  There is mild to moderate (1-2+) mitral regurgitation.     Tricuspid Valve  There is mild (1+) tricuspid regurgitation. The right ventricular systolic  pressure is approximated at 28.9 mmHg plus the right atrial pressure. IVC  diameter >2.1 cm collapsing <50% with sniff suggests a high RA pressure  estimated at 15 mmHg or greater. Right ventricular systolic pressure is  elevated, consistent with mild to moderate pulmonary hypertension.     Aortic Valve  The aortic valve is trileaflet. There is trace aortic regurgitation. No  hemodynamically significant valvular aortic stenosis.     Pulmonic Valve  There is trace pulmonic valvular regurgitation. Normal pulmonic valve  velocity.     Vessels  Borderline aortic root dilatation. Normal size ascending aorta. IVC diameter  >2.1 cm collapsing <50% with sniff suggests a high RA pressure estimated at 15  mmHg or greater.      Pericardium  There is no pericardial effusion.     Rhythm  Sinus rhythm was noted. The patient exhibited occasional PACs.  ______________________________________________________________________________  MMode/2D Measurements & Calculations     IVSd: 1.1 cm  LVIDd: 3.9 cm  LVIDs: 3.1 cm  LVPWd: 0.91 cm  FS: 22.2 %  LV mass(C)d: 119.5 grams  LV mass(C)dI: 76.8 grams/m2  Ao root diam: 3.7 cm  LA dimension: 3.1 cm  asc Aorta Diam: 3.4 cm  LA/Ao: 0.84  LVOT diam: 1.9 cm  LVOT area: 2.8 cm2  LA Volume (BP): 44.6 ml  LA Volume Index (BP): 28.6 ml/m2  RWT: 0.46     Doppler Measurements & Calculations  MV E max linda: 77.1 cm/sec  MV A max linda: 75.8 cm/sec  MV E/A: 1.0  MV max PG: 3.1 mmHg  MV mean P.0 mmHg  MV V2 VTI: 29.2 cm  MVA(VTI): 2.2 cm2  MV P1/2t max linda: 90.1 cm/sec  MV P1/2t: 103.1 msec  MVA(P1/2t): 2.1 cm2  MV dec slope: 256.0 cm/sec2  MV dec time: 0.14 sec  AI P1/2t: 297.2 msec  LV V1 max P.4 mmHg  LV V1 max: 105.0 cm/sec  LV V1 VTI: 22.2 cm  SV(LVOT): 62.9 ml  SI(LVOT): 40.4 ml/m2  PA acc time: 0.13 sec  TR max linda: 269.0 cm/sec  TR max P.9 mmHg  E/E' av.1  Lateral E/e': 7.3  Medial E/e': 10.9     ______________________________________________________________________________  Report approved by: Re Sebastian 2021 03:20 PM           Component 2 wk ago   LVEF  55-60%             ASSESSMENT / PLAN      Pulmonary Diagnoses:  COPD J44.9   Acute on chronic hypercapnic respiratory failure  Chronic hypoxemia  Encephalopathy due to hyperrcarbia  tachycardia due to meds; hr was 60-70 asleep but 110-130 or higher awake.     ASSESSMENT :     Advanced COPD  Hypercarbic resp failure, acute and chronic  Pneumonia  GIANA  Hypoventilation with sleep.   Hypoxemia, chronic  Secondary pulmonary hypertension  Mitral insufficeincfelicity      Seems to have her days and nights reversed a bit--sleeping this afternoon, not on bipap, so I got to see her having both hypoventilation and obstructive hypopneas. 4L  did not resolve the desats with sleep.       PLAN:     I discussed trying to use the biPAP for naps, which she was observably not doing today.     Discussed benefits of use of bipap to rest respiratory mm. Suspect she will not do this at home.     Unfortunately, I don't think she is really far off her baseline at present.     Tapering steroids may have helped, she seemed less anxious and agitated today. Medroll is often better tolerated and the dose is low. She is not bronchospastic and doing fine with it.     Change to lev-albuterol due to the tachy cardia was ordered yesterday but she still seems to be on albuterol and had a HR up to 167 today at one point. I ordered it CHASE and she is still on albuterol. Anticipate tachycardia will persist.     Stay away from LABA drugs at discharge.     If mental status allows, bedside PT and up to chair TID. Discharge to [facility] in a few days.     We will see her before discharge later this coming week. Suggest medrol 4 mg for a few more days then drop to 2mg po daily on Tuesday. Stop on Friday. Inhaled steroid + Incruse + prn KARINA upon discharge. NO LABA>       [facility]  [phone]

## 2022-01-01 NOTE — PROGRESS NOTES
"[facility]    Medicine Progress Note - Hospitalist Service       Date of Admission:  12/28/2021    Assessment & Plan      Carmen Perez is a 71 year old female admitted on 12/28/2021. She was recently admitted to [facility] from 12/16-12/19/2021 for acute exacerbation of severe COPD, also open fracture of the left fourth proximal phalanx, currently at [facility], came to the emergency department with \"agitation\" stating that staff at [facility] were not attentive to her needs.  She called 911.  Here, chest x-ray suggests left upper lobe infiltrate.    Principal Problem:  Pneumonia due to infectious organism  Sepsis    Checked procalcitonin, it was modestly elevated (0.15), now < 0.05    Sepsis due to pneumonia, resolving: Diagnosis based on HR > 90 bpm, RR > 20 breaths/min and WBC > 12 due to infection.      Treat with broad-spectrum antibiotics, currently on Zosyn    Pulmonary consult requested, I appreciate Dr. Boyce's evaluation and recommendations    Treat with supplemental oxygen,. Per Pulmonary, \"Titrate fiO2 down and target sats of 88-94% range at rest on low flow O2. \"    Added nebulized bronchodilators    Active Problems:    COPD, severe (H), with exacerbation    Acute on chronic respiratory failure with hypoxia and hypercarbia,  on home oxygen therapy (H)   Spiculated lung nodules, suspicious for lung cancer    Typically uses 3.5 L of oxygen continuously    On admission, oxygen saturation was 100% on 3 L per nasal cannula    Subsequent venous blood gas showed PCO2 122 mmHg, BiPAP was applied    Please see results of serial blood gas measurements from 12/-16->12/28/  PCO2 was 114, improved to 71 (at [facility]) with treatment, including bronchodilators and biPAP.  As above, PCO2 yesterday was 122, improved only marginally to 111 with BiPAP.  CO2 on BMP was > 45.  She shows maximal renal compensation for severe respiratory acidosis, but not sustainable over the long term.  " "    Resumed corticosteroids, nebulized bronchodilators    Also per Pulmonary, \"Due to bouts of tachycardia, stop LABA and avoid in future. Spiriva, daily prednisone, and prn albuterol nebs would be a good regimen for her in future.\"    Chest CT shows advanced bullous emphysema and spiculated lung nodule in the left lower lobe and left lower lobe indeterminate but suspicious for malignancy    I discussed the chest CT findings with patient and her son, Narciso.  They are aware that the nodules could be cancer.  She does not want biopsy because she would not want cancer treatment under any circumstances    Wean BiPAP as able, but hypercarbia will likely return any time she is off NIVV. See goals of care discussion, below.    Changed to oral steroid, taper as quickly as possible/practical due to psychosis    Discussed with Dr. Boyce, she recommends Medrol 4 mg oral daily    See discharge medication regimen recommended by Pulmonary, \"Suggest medrol 4 mg for a few more days then drop to 2mg po daily on Tuesday. Stop on Friday. Inhaled steroid + Incruse + prn KARINA upon discharge. 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  Note that she insisted on bringing her belongings with her to her chest CT today    Treat hypercarbia and/or infection    Son, Narciso, has been at the bedside today and acknowledges her profound confusion and paranoia. Son says her worsening confusion and paranoia were beginning even during the last hospital stay and worsened at [facility][note 3].  He further notes that she sent him multiple text messages during her emergency department stay, messages were bizarre and nonsensical    Psychosis/paranoia also be related to medications, including corticosteroids.  Changed to oral steroids, use lowest effective dose    Carmen makes " "bizarre statements such as, \"I am going to knife you.\"  She also refuses essential cares, including biPAP and labs.  We have been giving anti-psychotics as needed for her safety, safety of staff    Psychiatry consult again requested to assist with medication management     Now on Zyprexa 5 mg BID    Mood is less agitated, probably due to scheduled anti-psychotic medication and lower dose steroid    Paroxysmal SVT    At outside hospital, patient had Intermittent tachycardia with heart rate around 130, other times in NSR with normal rates.  \"Telemetry and EKG difficult to assess whether this is A. fib or other SVT, but given the tachycardia is very regular and marches out on EKG more likely SVT other than A. Fib.\"    Started on oral diltiazem here and heart rate improved when she is in SVT.    Continue diltiazem extended release 120 mg daily    Not definitively A. fib and patient would like to minimize pill burden so did not start anticoagulation.\"    Recheck EKG    See above, re: discontinuing LABA      Pulmonary HTN , group 3    Elevated troponin values, likely type 2 MI, demand ischemia due to acute on chronic respiratory failure    Noted    Troponin values are just above the upper limit of normal, likely represents mild demand ischemia due to respiratory failure    Echo is still pending      S/P colostomy -since colonic perforation in 5/2016 due to sigmoid diverticulitis    Noted    [facility] consult for routine ostomy cares      Open displaced fracture of proximal phalanx of left ring finger, initial encounter    Carmen tripped at home on her oxygen tubing    She was seen by orthopedics at [facility], they applied a cast, recommended a course of oral Keflex and outpatient follow-up with Ortho     Anemia in other chronic diseases classified elsewhere    Hemoglobin is stable    Goals of care:   Again discussed CODE STATUS and goals of care with son, Narciso, at the bedside.  He indicates she desires DNR/DNI, she " "agrees.    We discussed that recent blood gases show progressive hypercarbia which may recur if she is off BiPAP for any length of time.  They would like to continue routine medical cares including BiPAP, nebulized bronchodilators, antibiotics and see if she responds to this.  If not, they would consider changing emphasis to her comfort and involving palliative care.      Diet: Regular Diet Adult    DVT Prophylaxis: Enoxaparin (Lovenox) SQ  Quinn Catheter: Not present  Central Lines: None  Code Status: No CPR- Do NOT Intubate      Disposition Plan   Expected Discharge: several days  Anticipated discharge location:  Awaiting care coordination huddle  Delays:     improved mental status    Patience Knapp MD  Hospitalist Service  [facility]  Securely message with the Vocera Web Console (learn more here)  Text page via [employer] Paging/Directory      Clinically Significant Risk Factors Present on Admission                  ______________________________________________________________________    Interval History   \"I need to get away from this hospital because of all the bad things I brought.\"  Patient is calm, makes bizarre statements but mood is overall more agreeable, less agitated.  She wants BiPAP mask taken off, wants ice chips and water.  She has no new respiratory or GI complaints.  We discussed that her son, Narciso, would like for her be able to go to [facility] at discharge because this is close to his home and work and he could visit her there.  She is in favor.    Data reviewed today: I reviewed all medications, new labs and imaging results over the last 24 hours. I personally reviewed the chest CT image(s) showing Spiculated nodules, advanced bullous emphysema.    Physical Exam   Vital Signs: Temp: 98.2  F (36.8  C) Temp src: Axillary BP: 123/69 Pulse: 101   Resp: 22 SpO2: 100 % O2 Device: Nasal cannula Oxygen Delivery: 4 LPM  Weight: 124 lbs 5.43 oz  Constitutional: Awake, " alert.    Respiratory: Decreased breath sounds throughout.    Cardiovascular: Tachycardic, regular rate and rhythm  GI: Normal bowel sounds, soft, ostomy appliance with soft brown effluent  Skin/Integumen: No rash on exposed skin  Other: Mood is calm      Data   Recent Labs   Lab 01/01/22  0756 12/31/21  0514 12/30/21  0558 12/28/21  2131 12/28/21  1102   WBC 12.0* 11.3* 14.2*   < > 16.2*   HGB 9.6* 8.3* 9.1*   < > 10.2*   * 104* 103*   < > 105*    269 290   < > 286   * 146* 142   < > 142   POTASSIUM 4.3 4.1 4.9   < > 4.3   CHLORIDE 106 103 100   < > 98   CO2 42* 45* >45*   < > >45*   BUN 34* 37* 38*   < > 17   CR 0.53 0.69 0.60   < > 0.55   ANIONGAP <1* <1* <1*   < > <1*   OLI 8.9 9.1 9.3   < > 8.6   * 130* 143*   < > 98   ALBUMIN  --   --   --   --  3.1*   PROTTOTAL  --   --   --   --  6.5*   BILITOTAL  --   --   --   --  0.2   ALKPHOS  --   --   --   --  19*   ALT  --   --   --   --  27   AST  --   --   --   --  13    < > = values in this interval not displayed.     Recent Results (from the past 24 hour(s))   XR Chest Port 1 View    Narrative    EXAM: XR CHEST PORTABLE 1 VIEW  LOCATION: [facility]  DATE/TIME: 01/01/2022, 8:35 AM    INDICATION: Severe COPD, abnormal EKG, tachycardia and respiratory failure.  COMPARISON: 12/16/2021.      Impression    IMPRESSION: Emphysematous changes and areas of bilateral pulmonary scarring appear stable. No new acute airspace disease identified. Normal cardiac silhouette.       Medications     dextrose 5% and 0.9% NaCl 50 mL/hr at 12/31/21 1703     - MEDICATION INSTRUCTIONS -       - MEDICATION INSTRUCTIONS -         acetaminophen  1,000 mg Oral TID     diltiazem ER COATED BEADS  120 mg Oral Daily     enoxaparin ANTICOAGULANT  40 mg Subcutaneous Q24H     ipratropium  0.5 mg Nebulization 4x daily     levalbuterol  1.25 mg Nebulization 4x daily     methylPREDNISolone  4 mg Oral Daily     OLANZapine zydis  5 mg Oral BID      piperacillin-tazobactam  3.375 g Intravenous Q6H     senna-docusate  1 tablet Oral Daily     sodium chloride (PF)  3 mL Intracatheter Q8H     sodium chloride (PF)  3 mL Intracatheter Q8H     umeclidinium  1 puff Inhalation Daily

## 2022-01-01 NOTE — PLAN OF CARE
"VSS. Safe with 1:1 sitter. Patient asleep majority of the shift. Able to express needs, follow directions. Placed on BiPAP at 2300. Sinus Tach. Colostomy and purewick intact. Continues with IV Zosyn. Will continue to monitor.    /50   Pulse 64   Temp 98.5  F (36.9  C)   Resp 21   Ht 1.6 m (5' 3\")   Wt 56.4 kg (124 lb 5.4 oz)   SpO2 95%   BMI 22.03 kg/m      "

## 2022-01-02 NOTE — PLAN OF CARE
"Pt transferred to station 88, writer was caring for her on station 33 earlier today, well known to me.    Settled ~1845  She was transferred on 4L oxymask, breathing quite labored with RR in the 30's  Sats checked upon arrival and they were down to 77%, O2 increased to 10L briefly until sats stabilized to 96%, then decreased back to 6L. Her baseline is 3.5L at home and she was stable on 4-5L earlier.  Breathing continues to be very labored, refusing bipap despite saying \"I can't breathe\".  Attempted to educate patient on work of breathing, risk of low o2 sats and benefits to placing bipap on for a little while however, she still refused.    LS diminished anteriorly.  Does not want purewick in place. Encouraged pt to use call light for assistance with bedpan or commode if needed.     Bedside handoff with NOC RN, pt stating \"not good\" when asked how she is but unwilling/unable to express what is not good nor what she would like for help.  Continues to refuse bipap at this time. Sats stable.  "

## 2022-01-02 NOTE — PLAN OF CARE
[id number]:    Intermittently lethargic, pleasant when bipap off, agreeable to using bipap when very sleepy, discussed at bedside with Dr. Knapp and pt agreed that she should wear bipap overnight and while taking naps or at least once during the day.    VS: tachycardic, Tele ST- tele tech reported inverted T wave until about 0848 when T wave became more elevated- provider aware. HR briefly up to 160's for a minute or two then back down to 130's as it has been. She did receive her morning dilt late because she was not alert enough to take PO meds this morning. - CTM.  LS diminished, shallow resp.  BS+, colostomy with output, stoma pink and protruding  Voiding more with purewick now that she is awake and aware purewick in place.  LUE with cast in place. Denies pain.  D5 NS at 50cc/hr    Off IMC.   Report given to HEATHER Merritt.

## 2022-01-02 NOTE — PLAN OF CARE
"A/Ox 2, disoriented to time and situation. Fluctuates throughout shift. Calm, cooperative. States \"you really don't need to worry about me, I'm not sick.\" Lethargic. VSS. 3L O2 baseline, 4-6L NC or oxymask currently. Placed on BiPAP at 2300, off at 0530. Desats at times even while on Bipap. Tele SR to [facility] Ax1 GB/W. Incontinent at times. Purewick placed. Initially refusing purewick but was then stating \"no I'd rather pee the bed\" when offered to get up to BSC or the bedpan. Colostomy. L wrist cast, denies pain in L arm. Discharge to TCU pending.     0625- Writer went to room since O2 monitor was beeping. Pt had removed oxymask and was at 43%. Minimally responsive. O2 increased to 15 L on oxymask and pt switched back to BiPAP. Recovered quickly once back on BiPAP. Currently 95%. spo2 40% FiO2. RR 24. A/Ox3, alert.   "

## 2022-01-02 NOTE — PLAN OF CARE
Patient A&0x3 this morning More lethargic this afternoon. Received critical C02 lab and patient put back on bipap. Hospitalist aware.  IV is running D5 at 75 for increasing NA. R hand and forearm  are in a splint. Cap refill is <3 and fingers are warm. She denies numbness and tingling.  Lungs diminished and crackles  noted. On Nc for part of the day  at 4ltrs and 02 at 92-95%. Bi pap with naps and HS if she tolerates it. Tele is [facility]. Pure Wick in place. Colostomy in place. Plan to consult palliative  or hospice.

## 2022-01-02 NOTE — PROGRESS NOTES
Pulmonary Note    Events over weekend noted, she really does not stay off biPAP for very long before becoming paranoid and agitated or lethargic due to hypercapnia. She is on max therapy. We could leave her on 2 mg po medrol instead of neb bid budesonide for some daily steroids. She is on scheduled nebs and LAMA (Spiriva or Incruse). Her COPD was severe in 2013 by PFTS and she has end stage hypercapnic respiratory failure due to advanced emphysema now. Hospice would be an appropriate support for her. I do not expect she would remain at NH for very long given her very poor lung function. Nothing acute or reversible seems to be present now, just a further decline in her functional status due to the recent pneumonia and sepsis. Keep sats in 88-94% when off biPAP. CO2 rises rapidly when on higher flow O2 and off biPAP. Time upright in chair and nutrition may be helpful.     pCXR in am to see if pneumonia improving and if atelectasis is better.   Continue iv abx  Continue low dose medrol  Continue intermitant biPAP    Very guarded prognosis for her end stage COPD and hypercapnic resp failure.     Dr Hogan is rounding M-W this coming week.     Please call if we can help. [phone]    [facility]  [phone]

## 2022-01-02 NOTE — PROGRESS NOTES
"[facility]    Medicine Progress Note - Hospitalist Service       Date of Admission:  12/28/2021    Addendum:  RN paged saying patient is somnolent, she is on biPAP.  I called son, Narciso, again ([phone]) to recommend change to emphasis on comfort, no answer.  Per our multiple prior discussions, continue medical cares (IVF, antibiotics, biPAP), understanding that prognosis is poor and there is no circumstance where we would recommend escalating cares.  Son understands she may die during this hospital stay, affirmed DNR/DNI with him, patient agreed.    Assessment & Plan      Carmen Perez is a 71 year old female admitted on 12/28/2021. She was recently admitted to [facility] from 12/16-12/19/2021 for acute exacerbation of severe COPD, also open fracture of the left fourth proximal phalanx, currently at [facility], came to the emergency department with \"agitation\" stating that staff at [facility] were not attentive to her needs.  She called 911.  Here, chest x-ray suggests left upper lobe infiltrate.    Principal Problem:  Pneumonia due to infectious organism  Sepsis    Checked procalcitonin, it was modestly elevated (0.15), now < 0.05    Sepsis due to pneumonia, resolving: Diagnosis based on HR > 90 bpm, RR > 20 breaths/min and WBC > 12 due to infection.      Treat with broad-spectrum antibiotics, currently on Zosyn through 1/2    Pulmonary consult requested, I appreciate Dr. Boyce's evaluation and recommendations    Treat with supplemental oxygen,. Per Pulmonary, \"Titrate fiO2 down and target sats of 88-94% range at rest on low flow O2. \"    Added nebulized bronchodilators    Active Problems:    COPD, severe (H), with exacerbation    Acute on chronic respiratory failure with hypoxia and hypercarbia,  on home oxygen therapy (H)   Spiculated lung nodules, suspicious for lung cancer    Typically uses 3.5 L of oxygen continuously    On admission, oxygen saturation was 100% on 3 L " "per nasal cannula    Subsequent venous blood gas showed PCO2 122 mmHg, BiPAP was applied    Please see results of serial blood gas measurements from 12/-16->12/28/  PCO2 was 114, improved to 71 (at [facility]) with treatment, including bronchodilators and biPAP.  As above, PCO2 yesterday was 122, improved only marginally to 111 with BiPAP.  CO2 on BMP was > 45.  She shows maximal renal compensation for severe respiratory acidosis, but not sustainable over the long term.      Resumed corticosteroids, nebulized bronchodilators    Also per Pulmonary, \"Due to bouts of tachycardia, stop LABA and avoid in future. Spiriva, daily prednisone, and prn albuterol nebs would be a good regimen for her in future.\"    Chest CT shows advanced bullous emphysema and spiculated lung nodule in the left lower lobe and left lower lobe indeterminate but suspicious for malignancy    I discussed the chest CT findings with patient and her son, Narciso.  They are aware that the nodules could be cancer.  She does not want biopsy because she would not want cancer treatment under any circumstances    Use biPAP as she will allow, mostly at bedtime.  Severe hypercarbia returns any time she is off NIVV. See goals of care discussion, below.    Changed to oral steroid, taper as quickly as possible/practical due to psychosis    Discussed with Dr. Boyce, she recommends Medrol 4 mg oral daily    See discharge medication regimen recommended by Pulmonary, \"Suggest medrol 4 mg for a few more days then drop to 2mg po daily on Tuesday. Stop on Friday. Inhaled steroid + Incruse + prn KARINA upon discharge. NO LABA.\"    Patient has not demonstrated any sustained clinical improvement despite 5 days of maximal medical treatment.  Chest CT shows lung nodules, possibly malignant, patient does not want cancer treatment under any circumstance.  Overall prognosis is poor.  Discussed (earlier in the week) with son, Narciso, recommended Palliative Care consult.  After discharge, " "patient likely would not derive any benefit from returning to hospital for treatment of COPD, consider \"do not hospitalize\"order, emphasis on comfort.  I called son again today, no answer.  Will try again.    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  Note that she insisted on bringing her belongings with her to her chest CT today    Treat hypercarbia and/or infection    Son, Narciso, has been at the bedside today and acknowledges her profound confusion and paranoia. Son says her worsening confusion and paranoia were beginning even during the last hospital stay and worsened at [facility].  He further notes that she sent him multiple text messages during her emergency department stay, messages were bizarre and nonsensical    Psychosis/paranoia also be related to medications, including corticosteroids.  Changed to oral steroids, use lowest effective dose    Henderson made bizarre statements such as, \"I am going to knife you.\"  She often refused essential cares, including biPAP and labs.  We have been giving anti-psychotics as needed for her safety, safety of staff.  Hold for excessive sedation.    Psychiatry consult again requested to assist with medication management     Now on Zyprexa 5 mg BID    Mood is less agitated, probably due to scheduled anti-psychotic medication and lower dose steroid    Paroxysmal SVT    At outside hospital, patient had Intermittent tachycardia with heart rate around 130, other times in NSR with normal rates.  \"Telemetry and EKG difficult to assess whether this is A. fib or other SVT, but given the tachycardia is very regular and marches out on EKG more likely SVT other than A. Fib.\"    Started on oral diltiazem here and heart rate improved when she is in SVT.    Continue diltiazem extended release 120 mg daily    Not " "definitively A. fib and patient would like to minimize pill burden so did not start anticoagulation.\"    Recheck EKG    See above, re: discontinuing LABA      Pulmonary HTN , group 3    Elevated troponin values, likely type 2 MI, demand ischemia due to acute on chronic respiratory failure    Noted    Troponin values are just above the upper limit of normal, likely represents mild demand ischemia due to respiratory failure    Echo is still pending      S/P colostomy -since colonic perforation in 5/2016 due to sigmoid diverticulitis    Noted    [facility] consult for routine ostomy cares      Open displaced fracture of proximal phalanx of left ring finger, initial encounter    Carmen tripped at home on her oxygen tubing    She was seen by orthopedics at [facility], they applied a cast, recommended a course of oral Keflex and outpatient follow-up with Ortho     Anemia in other chronic diseases classified elsewhere    Hemoglobin is stable    Goals of care:   Again discussed CODE STATUS and goals of care with son, Narciso, at the bedside.  He indicates she desires DNR/DNI, she agrees.    .Patient has not demonstrated any sustained clinical improvement despite 5 days of maximal medical treatment.  Chest CT shows lung nodules, possibly malignant, patient does not want cancer treatment under any circumstance.  Overall prognosis is poor.  Discussed with son, Narciso, recommended Palliative Care consult.  After discharge, patient likely would not derive any benefit from returning to hospital for treatment of COPD, consider \"do not hospitalize\"order, emphasis on comfort.  I called son to discuss again, no answer today.    Diet: Regular Diet Adult    DVT Prophylaxis: Enoxaparin (Lovenox) SQ  Quinn Catheter: Not present  Central Lines: None  Code Status: No CPR- Do NOT Intubate      Disposition Plan   [place]  Anticipated discharge location:  Awaiting care coordination huddle  Delays:     Anticipate change to " "emphasis on comfort following palliative care consult.  Needs SNF placement, son prefers [facility] and [facility] because this is close to his work (he owns a restaurant in [place] and is a  there)    Patience Knapp MD  Hospitalist Service  [facility]  Securely message with the Vocera Web Console (learn more here)  Text page via [employer] Paging/Directory      Clinically Significant Risk Factors Present on Admission                     ______________________________________________________________________    Interval History   \"Fine. I am fine.\"  Patient seen during mid morning, she denies feeling short of breath but is tachypneic, pursed lip breathing.  She denies pain, no GI complaints.    Data reviewed today: I reviewed all medications, new labs and imaging results over the last 24 hours. I personally reviewed the chest CT image(s) showing Spiculated nodules, advanced bullous emphysema.    Physical Exam   Vital Signs: Temp: (!) 96  F (35.6  C) Temp src: Axillary BP: 121/41 Pulse: 92   Resp: 21 SpO2: 94 % O2 Device: Oxymask Oxygen Delivery: 4 LPM  Weight: 124 lbs 5.43 oz  Constitutional: Awake, alert, confused  Respiratory: Decreased breath sounds throughout.  Tachypnea, with pursed lip breathing  Cardiovascular:  regular rate and rhythm  GI: Normal bowel sounds, soft, ostomy appliance with soft brown effluent  Skin/Integumen: No rash on exposed skin  Other: Mood is calm      Data   Recent Labs   Lab 01/01/22  0756 12/31/21  0514 12/30/21  0558 12/28/21  2131 12/28/21  1102   WBC 12.0* 11.3* 14.2*   < > 16.2*   HGB 9.6* 8.3* 9.1*   < > 10.2*   * 104* 103*   < > 105*    269 290   < > 286   * 146* 142   < > 142   POTASSIUM 4.3 4.1 4.9   < > 4.3   CHLORIDE 106 103 100   < > 98   CO2 42* 45* >45*   < > >45*   BUN 34* 37* 38*   < > 17   CR 0.53 0.69 0.60   < > 0.55   ANIONGAP <1* <1* <1*   < > <1*   OLI 8.9 9.1 9.3   < > 8.6   * 130* 143*   < > 98   ALBUMIN  " --   --   --   --  3.1*   PROTTOTAL  --   --   --   --  6.5*   BILITOTAL  --   --   --   --  0.2   ALKPHOS  --   --   --   --  19*   ALT  --   --   --   --  27   AST  --   --   --   --  13    < > = values in this interval not displayed.     No results found for this or any previous visit (from the past 24 hour(s)).  Medications     dextrose 5% and 0.9% NaCl 50 mL/hr at 01/01/22 1640     - MEDICATION INSTRUCTIONS -       - MEDICATION INSTRUCTIONS -         acetaminophen  1,000 mg Oral TID     diltiazem ER COATED BEADS  120 mg Oral Daily     enoxaparin ANTICOAGULANT  40 mg Subcutaneous Q24H     ipratropium  0.5 mg Nebulization 4x daily     levalbuterol  1.25 mg Nebulization 4x daily     methylPREDNISolone  4 mg Oral Daily     OLANZapine zydis  5 mg Oral BID     piperacillin-tazobactam  3.375 g Intravenous Q6H     senna-docusate  1 tablet Oral Daily     sodium chloride (PF)  3 mL Intracatheter Q8H     sodium chloride (PF)  3 mL Intracatheter Q8H     umeclidinium  1 puff Inhalation Daily

## 2022-01-02 NOTE — PROGRESS NOTES
Care Management Follow Up    Length of Stay (days): 5    Expected Discharge Date: 01/05/2022     Concerns to be Addressed:       Patient plan of care discussed at interdisciplinary rounds: Yes    Anticipated Discharge Disposition:       Anticipated Discharge Services:    Anticipated Discharge DME:      Patient/family educated on Medicare website which has current facility and service quality ratings:    Education Provided on the Discharge Plan:    Patient/Family in Agreement with the Plan:      Referrals Placed by CM/SW:    Private pay costs discussed: Not applicable    Additional Information:   following for discharge planning. Patient will need TCU at discharge. Anticipate discharge in couple days per rounds with RNCC and charge RN.     Writer placed call to TCU referrals:  [facility]: Left voicemail for admissions.     [facility], and [facility].     may need to obtain additional choices.         NELL Rodriguez

## 2022-01-03 NOTE — PLAN OF CARE
Physical Therapy Discharge Summary    Reason for therapy discharge:    Change in medical status.  Transitioned to comfort cares    Progress towards therapy goal(s). See goals on Care Plan in [facility] electronic health record for goal details.  Goals not met.  Barriers to achieving goals:   limited tolerance for therapy.    Therapy recommendation(s):    No further therapy is recommended.

## 2022-01-03 NOTE — PROGRESS NOTES
[facility]    Medicine Progress Note - Hospitalist Service       Date of Admission:  12/28/2021    Assessment & Plan      71 year old female recently admitted to [facility] from 12/16-12/19/2021 for acute exacerbation of severe COPD, also open fracture of the left fourth proximal phalanx, [facility] -- readmitted 12/28/2021 after she called 911 agitated saying [place] staff not attentive to her needs, and chest x-ray suggests left upper lobe infiltrate.    Principal Problem:  Aspiration Pneumonia  Sepsis  -- was on Zosyn, will switch to augmentin, sepsis improved but still obtunded    Active Problems:    COPD, severe (H), with exacerbation    Acute on chronic respiratory failure w hypoxia & hypercarbia, on home O2 (3.5 LPM)   Spiculated lung nodules, suspicious for lung cancer    Refusing BIPAP, son in agreement, anticipate comfort care if no improvement     Acute encephalopathy, likely due to severe hypercarbia  Psychosis, possibly related to medications, especially steroids    Avoid excessive O2, aim for O2 sat 89% or above    Paroxysmal Atrial Fib w RVR  -- on Diltazem  -- discontinue telemetry      Pulmonary HTN     NSTEMI -- probable demand ischemiaNoted      S/P colostomy -since colonic perforation in 5/2016 due to sigmoid diverticulitis      Open displaced fracture of proximal phalanx of left ring finger    Fell at home, was on Keflex per ortho, will go with Augmentin now     Anemia in other chronic diseases classified elsewhere    Hemoglobin is stable    Goals of care:  DNR/DNI, no BIPAP (she pulls it off anyway), will keep son updated, his may focus is comfort and if continued decline may decide comfort care.  Discussed with nurse.     Diet: Regular Diet Adult    DVT Prophylaxis: Enoxaparin (Lovenox) SQ  Quinn Catheter: Not present  Central Lines: None  Code Status: No CPR- Do NOT Intubate      Disposition Plan   Expected Discharge: Discharge to TCU if  "improving, if further decline then comfort care    Silvio Morel MD   Pager: [phone]  Cell Phone:  [phone]     (35 min total)  ______________________________________________________________________    Interval History   Obtunded, currently appears comfortable on 5 LPM with O2 sat 100%.  Tried BIPAP during the night, repeatedly pulled it off.     Physical Exam   Vital Signs: Temp: 97.9  F (36.6  C) Temp src: Axillary BP: 99/45 Pulse: 96   Resp: 20 SpO2: 100 % O2 Device: Oxymask Oxygen Delivery: 5 LPM  Weight: 124 lbs 5.43 oz  Constitutional: Lethargic, eyes closed   Respiratory: decreased breath sounds bilaterally  Cardiovascular:  regular rate and rhythm, currently in NSR (did have episode of afib with rat 67  GI: Normal bowel sounds, soft, ostomy with brown liquid  Skin/Integumen: No rash on exposed skin, no ankle edema  Neuro: opens eyes to chest rub, shook head \"No\" when asked if any pain then dozed off again.       Data   Recent Labs   Lab 01/02/22  1213 01/01/22  0756 12/31/21  0514 12/28/21  2131 12/28/21  1102   WBC 13.8* 12.0* 11.3*   < > 16.2*   HGB 8.2* 9.6* 8.3*   < > 10.2*   * 103* 104*   < > 105*    232 269   < > 286   * 146* 146*   < > 142   POTASSIUM 4.2 4.3 4.1   < > 4.3   CHLORIDE 104 106 103   < > 98   CO2 >45* 42* 45*   < > >45*   BUN 27 34* 37*   < > 17   CR 0.54 0.53 0.69   < > 0.55   ANIONGAP <1* <1* <1*   < > <1*   OLI 9.1 8.9 9.1   < > 8.6   * 109* 130*   < > 98   ALBUMIN  --   --   --   --  3.1*   PROTTOTAL  --   --   --   --  6.5*   BILITOTAL  --   --   --   --  0.2   ALKPHOS  --   --   --   --  19*   ALT  --   --   --   --  27   AST  --   --   --   --  13    < > = values in this interval not displayed.     Recent Results (from the past 24 hour(s))   Echocardiogram Complete   Result Value    LVEF  55-60%    [facility]    [id number]  [id number]  [id number]  [id number]^VINAYAK^JIM^ROSANNE     [facility]  Echocardiography Laboratory  [address] [place] [place] [address]     Name: GEOFF BRUCE  MRN: [id number]  : 1950  Study Date: 2022 10:43 AM  Age: 71 yrs  Gender: Female  Patient Location: [facility]  Reason For Study: BBB, COPD, Respiratory Failure  Ordering Physician: JIM LICEA  Referring Physician: JIM LICEA  Performed By: Kendall Morfin     BSA: 1.6 m2  Height: 63 in  Weight: 123 lb  HR: 112  BP: 115/62 mmHg  ______________________________________________________________________________  Procedure  Complete Portable Echo Adult.  ______________________________________________________________________________  Interpretation Summary     1. Normal biventricular size and function. Left ventricular ejection fraction  of 55-60%. No segmental wall motion abnormalities noted.  2. Normal sized atria.  3. No hemodynamically significant valvular disease.  4. Mildly elevated pulmonary artery pressure.  Compared to the previous echocardiogram on 2021, mitral regurgitation is  mild. IVC is normal.     ______________________________________________________________________________  Left Ventricle  The left ventricle is normal in size. There is normal left ventricular wall  thickness. Left ventricular systolic function is normal. The visual ejection  fraction is 55-60%. Diastolic function not assessed due to tachycardia.     Right Ventricle  The right ventricle is normal in size and function.     Atria  Normal left atrial size. Right atrial size is normal.     Mitral Valve  There is mild mitral annular calcification. There is mild (1+) mitral  regurgitation.     Tricuspid Valve  There is mild (1+) tricuspid regurgitation. The right ventricular systolic  pressure is approximated at 44.7 mmHg plus the right atrial pressure. IVC  diameter <2.1 cm collapsing >50% with sniff suggests a normal RA pressure of 3  mmHg.     Aortic Valve  The aortic valve is not well visualized. There is trace aortic regurgitation.  No hemodynamically  significant valvular aortic stenosis.     Pulmonic Valve  There is trace pulmonic valvular regurgitation.     Vessels  Borderline aortic root dilatation. Normal size ascending aorta. IVC diameter  >2.1 cm collapsing <50% with sniff suggests a high RA pressure estimated at 15  mmHg or greater.     Pericardium  There is no pericardial effusion.     Rhythm  The rhythm was sinus tachycardia.  ______________________________________________________________________________  MMode/2D Measurements & Calculations  IVSd: 1.2 cm     LVIDd: 3.6 cm  LVIDs: 2.7 cm  LVPWd: 1.0 cm  FS: 23.6 %  LV mass(C)d: 127.5 grams  LV mass(C)dI: 81.1 grams/m2  asc Aorta Diam: 3.7 cm  LVOT diam: 2.0 cm  LVOT area: 3.2 cm2  LA Volume (BP): 35.7 ml  LA Volume Index (BP): 22.7 ml/m2  RWT: 0.58     Doppler Measurements & Calculations  Ao V2 max: 202.0 cm/sec  Ao max P.0 mmHg  Ao V2 mean: 152.5 cm/sec  Ao mean PG: 10.2 mmHg  Ao V2 VTI: 32.2 cm  MARCELL(I,D): 2.3 cm2  MARCELL(V,D): 2.6 cm2  LV V1 max PG: 10.9 mmHg  LV V1 max: 165.2 cm/sec  LV V1 VTI: 22.9 cm  SV(LVOT): 74.0 ml  SI(LVOT): 47.1 ml/m2  PA V2 max: 122.2 cm/sec  PA max P.0 mmHg  PA acc time: 0.07 sec  TR max nicholas: 334.1 cm/sec  TR max P.7 mmHg  AV Nicholas Ratio (DI): 0.82  MARCELL Index (cm2/m2): 1.5     ______________________________________________________________________________  Report approved by: Re Brooks 2022 01:39 PM         XR Chest Port 1 View    Narrative    XR CHEST PORT 1 VIEW  1/3/2022 7:50 AM       INDICATION: acute on chronic resp failure, recent pneumonia, severe  copd. comparison  COMPARISON: 2022       Impression    IMPRESSION: New trace bilateral pleural effusions, left greater right.  New infiltrate or atelectasis in the left lower lobe compared to  previous. Lungs are otherwise hyperinflated with strands of linear  scarring or atelectasis in both lungs, similar to previous.    CHANEL COON MD         SYSTEM ID:  [id number]     Medications     -  MEDICATION INSTRUCTIONS -       - MEDICATION INSTRUCTIONS -         acetaminophen  1,000 mg Oral TID     diltiazem ER COATED BEADS  120 mg Oral Daily     enoxaparin ANTICOAGULANT  40 mg Subcutaneous Q24H     ipratropium  0.5 mg Nebulization 4x daily     levalbuterol  1.25 mg Nebulization 4x daily     methylPREDNISolone  4 mg Oral Daily     [Held by provider] OLANZapine zydis  5 mg Oral BID     piperacillin-tazobactam  3.375 g Intravenous Q6H     senna-docusate  1 tablet Oral Daily     sodium chloride (PF)  3 mL Intracatheter Q8H     sodium chloride (PF)  3 mL Intracatheter Q8H     umeclidinium  1 puff Inhalation Daily

## 2022-01-03 NOTE — PLAN OF CARE
A&Ox2-3, attitude fluctuated during shift. Pt placed on Bipap for critical Co2 labs - tolerated well during start of shift. Pt ripped bipap mask off and demanded that the NC be used, agreed upon the oxymask with stable O2 levels on 5L. Refused multiple attempts to replace bipap during night. Lungs diminished with crackles noted. PIV SL with intermittent zosyn. Tele - NS to [facility] with A2 + GBW, did not get OOB during shift. Purewick in place - good UOP. Colostomy in place. Plan to consult palliative or hospice.

## 2022-01-03 NOTE — PLAN OF CARE
"7a-3p nurse shift report  Overall deteriorating status.   More lethargic, less responsive only seeing movement of RUE. LUE remains on cast (PTA fx finger).   Writer told by previous night shift that they made several attempts for pt to wear Bipap of which pt repeatedly removed it and reportedly said \"No\"  at one time to night nurse.Tele shows sinus tach with infrequent PAC's with -110's. Writer left Bipap off this am notifed Dr Dorantes who dc'd Bipap, tele  and updated several other orders Writer titrated O2 down to 3LPM via oxymask by 1230 O2 sats low to mid 90's. Purewick in place - good UOP. Colostomy in place  Pt lethargic, was only able to say yes/not to a question and opened eyes to voice/with light touch once at shift start. By 12 noon, no verbal responseonly able to shake head slightly for a yes or no response to question and does not open eyes on command.. Resp 18-20. Shallow labored, Lungs dim to auscultation anteriorly.   Son, Narciso, said he is aware from speaking with the doctor's regarding  pt's end stage COPD and her declining status. Son preferring hospice placement at a facility for pt  "

## 2022-01-03 NOTE — PROGRESS NOTES
"PULMONOLOGY PROGRESS NOTE    Date of Admission: 12/28/2021    CC/Reason for Hospital visit: COPD, pneumonia, respiratory failure  SUBJECTIVE      Events of WE reviewed. Patient refused BiPAP during the night. Patient awake but minimally verbal.    ROS: A Problem Pertinent review of systems was negative except for items noted in HPI.  Past Medical, Family, and Social/Substance History has been reviewed: No interval changes.    OBJECTIVE   Vital signs:  Temp: 97.9  F (36.6  C) Temp src: Axillary BP: 99/45 Pulse: 100 (tele shows sinus tach with infrequent PAC's)   Resp: 20 SpO2: 100 % O2 Device: Oxymask Oxygen Delivery: 5 LPM Height: 160 cm (5' 3\") Weight: 56.4 kg (124 lb 5.4 oz)  Estimated body mass index is 22.03 kg/m  as calculated from the following:    Height as of this encounter: 1.6 m (5' 3\").    Weight as of this encounter: 56.4 kg (124 lb 5.4 oz).        I/O last 3 completed shifts:  In: 120 [P.O.:120]  Out: 825 [Urine:700; Stool:125]      CONSTITUTIONAL/GENERAL APPEARANCE: Alert female. No Apparent Distress.  PSYCHIATRIC: Pleasant and appropriate mood and affect. Oriented x 3.  EARS, NOSE,THROAT,MOUTH: External ears and nose overall normal. Normal oral mucosa.   NECK: Neck appearance normal. No neck masses and the thyroid is not enlarged.   RESPIRATORY: Non-labored effort. Decreased BS anteriorly.  CARDIOVASCULAR: S1, S2, regular rate and rhythm.      LABORATORY ASSESSMENT    Arterial Blood Gas  Recent Labs   Lab 12/31/21  0523 12/31/21  0146 12/30/21  1738 12/29/21  1700   PH 7.33* 7.35 7.30* 7.38   PCO2 88* 85* 92* 78*   PO2 134* 43* 84 105   HCO3 47* 47* 45* 46*   O2PER 50 35 4 24     CBC  Recent Labs   Lab 01/02/22  1213 01/01/22  0756 12/31/21  0514 12/30/21  0558   WBC 13.8* 12.0* 11.3* 14.2*   RBC 2.96* 3.48* 3.02* 3.28*   HGB 8.2* 9.6* 8.3* 9.1*   HCT 32.3* 35.7 31.4* 33.7*   * 103* 104* 103*   MCH 27.7 27.6 27.5 27.7   MCHC 25.4* 26.9* 26.4* 27.0*   RDW 13.1 13.4 13.2 13.0    232 269 " 290     BMP  Recent Labs   Lab 01/02/22  1213 01/01/22  0756 12/31/21  0514 12/30/21  0558   * 146* 146* 142   POTASSIUM 4.2 4.3 4.1 4.9   CHLORIDE 104 106 103 100   OLI 9.1 8.9 9.1 9.3   CO2 >45* 42* 45* >45*   BUN 27 34* 37* 38*   CR 0.54 0.53 0.69 0.60   * 109* 130* 143*     INRNo lab results found in last 7 days.   BNPNo lab results found in last 7 days.  VENOUS BLOOD GASES  Recent Labs   Lab 12/28/21  1649 12/28/21  1515 12/28/21  1206   PHV 7.21* 7.24* 7.21*   PCO2V 123* 111* 122*   PO2V 15* 31 23*   HCO3V 49* 48* 49*   TIFFANY 16.7* 16.7* 17.0*         Additional labs and/or comments:    IMAGING      CXR today - pending.    CXR 1/1 -  IMPRESSION: Emphysematous changes and areas of bilateral pulmonary scarring appear stable. No new acute airspace disease identified. Normal cardiac silhouette.    CT Chest 12/29 -  IMPRESSION:   1.  Advanced bullous emphysema. Mild right middle lobe bronchiectasis  with partial atelectasis.  2.  Left upper lobe spiculated 9 mm nodule and left lower lobe  spiculated 7 mm nodule are indeterminate but suspicious for  malignancy. A few other additional scattered patchy and linear  consolidative opacities could represent atelectasis and/or pneumonia, however malignancy is not entirely excluded. The largest is a 21 x 10 mm opacity in the left lower lobe at the lung bases. Consider follow up CT or PET-CT in about 3 months.   3.  Trace left pleural effusion.    PFT & OTHER TESTING       ASSESSMENT / PLAN      Pulmonary Diagnoses:  Abnl CT/CXR R91.8  COPD exacerb J44.1  Hypoxemia R09.02  Pneumonia unspec J18.9  Pulm HTN second I27.2  Resp fail acute J96.00  Resp fail chronic J96.10  SOB R06.02    Additional COVID-19 diagnoses:  Concern of possible exposure to COVID-19, Now RULED OUT Z03.818    ASSESSMENT: 71-year-old female with end-stage oxygen dependent COPD admitted for pneumonia and COPD exacerbation complicated by acute on chronic hypoxemic and hypercarbic respiratory  failure.  CT scan of the chest shows advanced bullous emphysema with right middle lobe bronchiectasis and partial atelectasis and spiculated left lung nodules concerning for malignancy.  Patient does not want to pursue further evaluation of the pulmonary nodules.  Hospital course complicated by ongoing respiratory acidosis and poor tolerance of BiPAP, largely secondary to acute encephalopathy and psychosis.  Repeat chest x-ray 1/1/2022 showed no new acute airspace disease; repeat chest x-ray today is pending.  Given end-stage disease and ongoing respiratory failure despite maximal medical therapy, it may be appropriate to consider a hospice approach.  Continue supportive therapy for now.    PLAN:  1. Adjust oxygen, keep SaO2 89-92% (baseline Home O2 3.5 L/NC).  2. Encourage BiPAP as able.  3. Bronchodilators - Xopenex + Atrovent nebs, Incruse  4. Antibiotics - Zosyn completed.  5. Continue Solumedrol 4 mg/day for now.  6. Await repeat CXR today.  7. Consider Hospice approach.      Brady Hogan M.D.    [facility]  [phone]   (pager)  [phone]   (office)

## 2022-01-03 NOTE — PROGRESS NOTES
Care Management Follow Up    Length of Stay (days): 6    Expected Discharge Date: 01/05/2022     Concerns to be Addressed:       Patient plan of care discussed at interdisciplinary rounds: Yes    Anticipated Discharge Disposition:       Anticipated Discharge Services:    Anticipated Discharge DME:      Patient/family educated on Medicare website which has current facility and service quality ratings:    Education Provided on the Discharge Plan:    Patient/Family in Agreement with the Plan:      Referrals Placed by CM/SW:    Private pay costs discussed: Not applicable    Additional Information:    Sw called [facility] and left  with admissions to check on pending TCU referral.  [facility], [facility] declined; may need additional choices pending goals of care.  Sw confirmed with Mikki at [facility] that son was to retrieve pt's belongings last week, Thursday.      Update:  [facility].  Waiting on MD to update on dispo plan before retrieving additional TCU choices.    Update:  Per RN, son Narciso would like to speak with Sw.  Sw met with son outside of pt's room to discuss LTC options/costs.  Per son, pt cannot afford any private pay costs; she has no money, no assets.  Son states that he is POA, unsure on HCD; no docs currently on file.  Sw went over different SNFs within [facility] and stated that if she cannot afford them and qualifies for MA, then would need to fill out MA jose before looking into SNF placements.  Son is agreeable; Sw reached out to [facility] counselor, and left /sent email.  Sw also mentioned OLP as a potential option pending bed availability and son was also in agreement.  Renetta will send out referral to OLP once MD has reviewed pt and determined comfort care appropriate.        Ny Calix, [facility] HospitalSW

## 2022-01-03 NOTE — PLAN OF CARE
Shift Note: 01/02/2022 6324-8179  At start of shift, very somnolent, required firm touch to arouse. ELMER orientation, or motor strength at that time. Bipap on throughout shift. Scheduled tylenol administered at 1752. SOB, desat'ed to 60% during the 2 min off bipap. Replaced mask quickly and sats back to 95-98%. Per pulmonology, to continue bipap (intermittently as able) due to rapid rise of CO2 when using high-flow. PIV infusing D5 at 75/hr. BP low (101/39), Hospitalist was notified (charge RN paged MD, and charge RN and I spoke to MD over phone). Tele: afib. Has maryjane NS-ST, but at admission, there was afib noted on tele, so not a new finding. Colostomy, good OP. Purewick in place, good UOP. Failed swallow study today, made NPO ex meds. RUE bruised, some redness at buttocks, turned/repo'd q2h, x2.  MD is discussing care planning with pt's son, recommends comfort cares, but son has requested continued tx and was unable to reach today. Remains DNR/DNI. Discharge possible midweek, but family aware pt may pass in hospital.

## 2022-01-04 NOTE — PROVIDER NOTIFICATION
CROSS COVER NOTE  Paged by RN regarding patient's agonal breathing. Per RN, this patient was placed on comfort care this afternoon though I see no documentation about this and the patient's orders do not reflect a full comfort care mindset. Given the ambiguity, I have ordered a one-time PRN dose of IV morphine for air hunger and IV glycopyrlate for secretions. Goal for the night will be to treat any air hunger without hastening the dying process given the lack of clarity on current goals of care. Day team needs to address this patient's care status promptly in the morning.    Riky Downing MD, MPH  Internal Medicine

## 2022-01-04 NOTE — PLAN OF CARE
DATE & TIME: 1/3-4/2022 8462-5851  Summary: comfort care   Cognitive Concerns/ Orientation : ELMER- nonverbal    BEHAVIOR & AGGRESSION TOOL COLOR: green    ABNL VS/O2: 2L oxymask 92-97%  MOBILITY: assist x2 w lift. Not OOB this shift. Turn and repo q2h, refused T/R this shift.  PAIN MANAGMENT:   DIET: regular   BOWEL/BLADDER: Purewick- adequate output. Colostomy- moderate output soft, black.   ABNL LAB/BG: Hgb 8.2  DRAIN/DEVICES: R PIV- SL, ecchymotic   TELEMETRY RHYTHM: NA   SKIN: bruised, pale, scat LUE  TESTS/PROCEDURES: NA  D/C DAY/GOALS/PLACE: pending hospice   OTHER IMPORTANT INFO: NA

## 2022-01-04 NOTE — PROGRESS NOTES
Care Management Follow Up    Length of Stay (days): 7    Expected Discharge Date: 01/04/2022     Concerns to be Addressed:       Patient plan of care discussed at interdisciplinary rounds: Yes    Anticipated Discharge Disposition:       Anticipated Discharge Services:    Anticipated Discharge DME:      Patient/family educated on Medicare website which has current facility and service quality ratings:    Education Provided on the Discharge Plan:    Patient/Family in Agreement with the Plan:      Referrals Placed by CM/SW:    Private pay costs discussed: Not applicable    Additional Information:    FA CounselingYoav, confirmed he received SW  regarding screening pt for MA eligibility for LTC placement.  Per notes, FA Counseling left son vm.      Ny Calix, [facility] HospitalSW

## 2022-01-04 NOTE — DISCHARGE SUMMARY
"[facility]    Death Summary  Hospitalist    Date of Admission:  12/28/2021  Date of Death:         1/4/2022  Provider Completing Death Summary: Silvio Morel MD    Discharge Diagnoses   Principal Problem:    Chronic respiratory failure with acute resp failure and Hypercarbia    End-stage COPD with Exacerbation    Pneumonia due to infectious organism  Active Problems:    S/P colostomy (H) - since colonic perforation in 5/2016 due to sigmoid diverticulitis    Pulmonary HTN - probably secondary to COPD per cardiology    Open displaced fracture of proximal phalanx of left ring finger    Anemia in other chronic disease    History of Present Illness   71 year old female with oxygen-dependent COPD, pulmonary hypertension, recently admitted to [facility] from 12/16-12/19/2021 for acute exacerbation of severe COPD, also open fracture of the left fourth proximal phalanx, [facility]     Notes from [facility] indicate, \"This is a 70 yo female who has chronic hypoxic respiratory failure on 3.5 L of oxygen who is unvaccinated for COVID-19 and has been isolating in her home for approximately 2 years who presented after a fall after tripping on her nasal cannula cord.  She was found to have significant hypercapnia requiring BiPAP and sustained a left fourth proximal phalanx commuted fracture.  Ortho was consulted and recommended oral Keflex and follow-up in the clinic.  Hypercapnia improved, started on Solu-Medrol for COPD exacerbation then changed to prednisone taper at discharge.  Also had intermittent A. fib versus SVT, started on oral diltiazem which improved rate and elected not to start AC.  Not interested in NIPPV at home.  Advised to get vaccinated.  Also apparently stopped her Lexapro a week ago as it seemed not to be helpful, will follow up outpatient.  No other changes noted so was discharged to [facility] for rehab.\"     This morning, she called 911 from " "[facility], saying that she was not receiving proper care at the facility.  EMS run sheet indicates, \"Arrive [facility] to find patient in her bed awaiting transport.  Patient stated that her needs were not being met, said she needed to void urine and her ostomy bag was full.  Patient has a history of COPD and she is breathing per her norm...... nursing staff burped her bag and patient urinated in the bed. Patient is on low-flow O2, NC, staff on scene stated she has begun to be verbally abusive to staff, calling them racist remarks.  I asked patient about said behavior and she said they deserved it and that they cannot even simply care for her basic needs.  Patient was without pants and underwear, she feels she has not been cared for appropriately patient was transported across the street to [facility].\"      Work-up here includes chest x-ray which shows, \"increasing patchy opacities at the left upper lateral lung, may be progression of pneumonia.  Emphysematous changes noted bilaterally.\"  WBC 14.4, hgb 10.1, Procalcitonin is modestly elevated.  Covid PCR is negative (please note, patient is unvaccinated.)  Venous blood gas shows pH 7.21 with CO2 123.    Hospital Course   Admitted to medicine floor, treated with steroids and BIPAP and IV antibiotics, Pulm consulted, patient ultimately refused to wear CPAP mask, discussed with son who opted for comfort care, and she passed away from End Stage COPD with exacerbation with superimposed pneumonia.        Cause of death: End-stage COPD with exacerbation, and superimposed pneumonia.     Silvio Morel MD       Pending Results   Unresulted Labs Ordered in the Past 30 Days of this Admission     No orders found from 11/28/2021 to 12/29/2021.          Primary Care Physician   Vicky Ye    Consultations This Hospital Stay   PULMONARY IP CONSULT  CARE MANAGEMENT / SOCIAL WORK IP CONSULT  PHYSICAL THERAPY ADULT IP CONSULT  WOUND OSTOMY CONTINENCE NURSE  IP " CONSULT  PSYCHIATRY IP CONSULT  PSYCHIATRY IP CONSULT  SOCIAL WORK IP CONSULT  INPATIENT HOSPICE ADULT CONSULT    Time Spent on this Encounter   I, Silvio Morel MD, personally saw the patient today and spent less than or equal to 30 minutes discharging this patient.    Data   Most Recent 3 CBC's:Recent Labs   Lab Test 01/02/22  1213 01/01/22  0756 12/31/21  0514   WBC 13.8* 12.0* 11.3*   HGB 8.2* 9.6* 8.3*   * 103* 104*    232 269      Most Recent 3 BMP's:  Recent Labs   Lab Test 01/02/22  1213 01/01/22  0756 12/31/21  0514   * 146* 146*   POTASSIUM 4.2 4.3 4.1   CHLORIDE 104 106 103   CO2 >45* 42* 45*   BUN 27 34* 37*   CR 0.54 0.53 0.69   ANIONGAP <1* <1* <1*   OLI 9.1 8.9 9.1   * 109* 130*     Most Recent 2 LFT's:  Recent Labs   Lab Test 12/28/21  1102 01/11/20  1155   AST 13 26   ALT 27 24   ALKPHOS 19* 20*   BILITOTAL 0.2 0.2     Most Recent INR's and Anticoagulation Dosing History:  Anticoagulation Dose History    There is no flowsheet data to display.       Most Recent 3 Troponin's:No lab results found.  Most Recent Cholesterol Panel:  Recent Labs   Lab Test 02/04/19  1725   CHOL 261*   *   HDL 81   TRIG 103     Most Recent 6 Bacteria Isolates From Any Culture (See EPIC Reports for Culture Details):  Recent Labs   Lab Test 05/18/16  0212 05/18/16  0208   CULT No growth No growth     Most Recent TSH, T4 and A1c Labs:  Recent Labs   Lab Test 10/02/17  1234   TSH 1.11

## 2022-01-04 NOTE — PLAN OF CARE
DATE & TIME: 1/3/2022 5515-9829   Summary: comfort care   Cognitive Concerns/ Orientation : ELMER- nonverbal    BEHAVIOR & AGGRESSION TOOL COLOR: green    ABNL VS/O2: 2L oxymask 92-97%  MOBILITY: assist x2 w lift. Not OOB this shift. Turn and repo   PAIN MANAGMENT: NA  DIET: regular   BOWEL/BLADDER: Purewick- adequate output. Colostomy- small output   ABNL LAB/BG: Hgb 8.2  DRAIN/DEVICES: R PIV- SL, ecchymotic   TELEMETRY RHYTHM: NA   SKIN: bruised, pale   TESTS/PROCEDURES: NA  D/C DAY/GOALS/PLACE: pending hospice   OTHER IMPORTANT INFO: morphine x1 for agonal breathing

## 2022-01-04 NOTE — PROGRESS NOTES
Pt  at 11:08am, pronounced by MD. Son, shirlene, updated by MD- no family at bedside or with plans to see body. All belongings sent with pt to [facility]. Possible eligibility for eye donation, eye bank to contact family.

## 2022-01-06 LAB
ATRIAL RATE - MUSE: 111 BPM
ATRIAL RATE - MUSE: 87 BPM
DIASTOLIC BLOOD PRESSURE - MUSE: NORMAL MMHG
DIASTOLIC BLOOD PRESSURE - MUSE: NORMAL MMHG
INTERPRETATION ECG - MUSE: NORMAL
INTERPRETATION ECG - MUSE: NORMAL
P AXIS - MUSE: 84 DEGREES
P AXIS - MUSE: 85 DEGREES
PR INTERVAL - MUSE: 156 MS
PR INTERVAL - MUSE: 164 MS
QRS DURATION - MUSE: 100 MS
QRS DURATION - MUSE: 98 MS
QT - MUSE: 326 MS
QT - MUSE: 358 MS
QTC - MUSE: 430 MS
QTC - MUSE: 443 MS
R AXIS - MUSE: -23 DEGREES
R AXIS - MUSE: -33 DEGREES
SYSTOLIC BLOOD PRESSURE - MUSE: NORMAL MMHG
SYSTOLIC BLOOD PRESSURE - MUSE: NORMAL MMHG
T AXIS - MUSE: 101 DEGREES
T AXIS - MUSE: 84 DEGREES
VENTRICULAR RATE- MUSE: 111 BPM
VENTRICULAR RATE- MUSE: 87 BPM

## 2022-01-11 ENCOUNTER — TELEPHONE (OUTPATIENT)
Dept: FAMILY MEDICINE | Facility: CLINIC | Age: 72
End: 2022-01-11
Payer: MEDICARE

## 2022-03-16 NOTE — PROGRESS NOTES
Attempted to contact patient in regard to missed clinic intake appointment. Voicemail box is not set up yet. Unable to leave a message.    Clinic Care Coordination Contact  Care Team Conversations      RNCC engaged in AIDET communications during encounter.       RNCC spoke with Merly Arrington, Clinical Coordinator with [facility].   Unfortunately, [facility] team did not ever get signed orders per her report on this patient, therefore she was not opened to [facility] services.   (Please see 2019 Documentation Only - Home Care/Hospice entry for details.)   The original order for [facility] services is now considered .     Dr. Mendez, would you mind reviewing my 2019 Patient Outreach and placing [facility] orders please?  Thank you!    Eloise Oh, BSN, RN   [facility]  Clinic Care Coordinator - [place] Locations   Direct:  [phone] (voicemail available)   (Today's Date: 2019)

## 2024-03-07 NOTE — TELEPHONE ENCOUNTER
Called [phone] (home)    30 day supply already given on 09/22/2017  PX scheduled with NATTY Rodríguez in [place] for 10/02/2017 (patient requested SV)    Toya Riojas RN  [facility]    
Furosemide (LASIX) 20 MG tablet      Last Written Prescription Date: 09/22/2017  Last Fill Quantity: 30 tablet, # refills: 0  Last Office Visit with G, P or [facility] prescribing provider: 09/07/2016       Potassium   Date Value Ref Range Status   07/11/2016 5.1 3.4 - 5.3 mmol/L Final     Creatinine   Date Value Ref Range Status   07/11/2016 0.98 0.52 - 1.04 mg/dL Final     BP Readings from Last 3 Encounters:   09/07/16 122/72   08/31/16 115/74   07/11/16 108/74           Crystal Martinez MA    
Pt did not follow up per last refill. Due for OV. Advised pharmacy.    Please call pt to schedule.  An Teresa RN  [facility]    
Detail Level: Zone
